# Patient Record
Sex: MALE | Race: BLACK OR AFRICAN AMERICAN | Employment: UNEMPLOYED | URBAN - METROPOLITAN AREA
[De-identification: names, ages, dates, MRNs, and addresses within clinical notes are randomized per-mention and may not be internally consistent; named-entity substitution may affect disease eponyms.]

---

## 2023-01-10 ENCOUNTER — APPOINTMENT (OUTPATIENT)
Dept: GENERAL RADIOLOGY | Facility: HOSPITAL | Age: 43
End: 2023-01-10
Attending: EMERGENCY MEDICINE
Payer: MEDICAID

## 2023-01-10 ENCOUNTER — APPOINTMENT (OUTPATIENT)
Dept: CT IMAGING | Facility: HOSPITAL | Age: 43
End: 2023-01-10
Attending: EMERGENCY MEDICINE
Payer: MEDICAID

## 2023-01-10 ENCOUNTER — HOSPITAL ENCOUNTER (INPATIENT)
Facility: HOSPITAL | Age: 43
LOS: 10 days | Discharge: HOME HEALTH | End: 2023-01-21
Attending: EMERGENCY MEDICINE | Admitting: INTERNAL MEDICINE
Payer: MEDICAID

## 2023-01-10 DIAGNOSIS — G06.0 INTRACRANIAL ABSCESS: ICD-10-CM

## 2023-01-10 DIAGNOSIS — N17.9 AKI (ACUTE KIDNEY INJURY) (HCC): ICD-10-CM

## 2023-01-10 DIAGNOSIS — G06.2 SUBDURAL EMPYEMA: ICD-10-CM

## 2023-01-10 DIAGNOSIS — E87.1 HYPONATREMIA: ICD-10-CM

## 2023-01-10 DIAGNOSIS — A41.9 SEVERE SEPSIS (HCC): Primary | ICD-10-CM

## 2023-01-10 DIAGNOSIS — R65.20 SEVERE SEPSIS (HCC): Primary | ICD-10-CM

## 2023-01-10 DIAGNOSIS — G40.109 FOCAL MOTOR SEIZURE (HCC): ICD-10-CM

## 2023-01-10 DIAGNOSIS — G93.40 ENCEPHALOPATHY: ICD-10-CM

## 2023-01-10 DIAGNOSIS — J18.9 COMMUNITY ACQUIRED PNEUMONIA OF LEFT LOWER LOBE OF LUNG: ICD-10-CM

## 2023-01-10 LAB
ALBUMIN SERPL-MCNC: 1.7 G/DL (ref 3.4–5)
ALP LIVER SERPL-CCNC: 255 U/L
ALT SERPL-CCNC: 102 U/L
ANION GAP SERPL CALC-SCNC: 13 MMOL/L (ref 0–18)
AST SERPL-CCNC: 290 U/L (ref 15–37)
BASOPHILS # BLD: 0 X10(3) UL (ref 0–0.2)
BASOPHILS NFR BLD: 0 %
BILIRUB DIRECT SERPL-MCNC: 2.4 MG/DL (ref 0–0.2)
BILIRUB SERPL-MCNC: 2.9 MG/DL (ref 0.1–2)
BILIRUB UR QL CFM: POSITIVE
BUN BLD-MCNC: 63 MG/DL (ref 7–18)
BUN/CREAT SERPL: 42 (ref 10–20)
CALCIUM BLD-MCNC: 8.9 MG/DL (ref 8.5–10.1)
CHLORIDE SERPL-SCNC: 90 MMOL/L (ref 98–112)
CLARITY UR: CLEAR
CO2 SERPL-SCNC: 24 MMOL/L (ref 21–32)
COLOR UR: YELLOW
CREAT BLD-MCNC: 1.5 MG/DL
DEPRECATED RDW RBC AUTO: 43.4 FL (ref 35.1–46.3)
DOHLE BOD BLD QL SMEAR: PRESENT
EOSINOPHIL # BLD: 0 X10(3) UL (ref 0–0.7)
EOSINOPHIL NFR BLD: 0 %
ERYTHROCYTE [DISTWIDTH] IN BLOOD BY AUTOMATED COUNT: 14.3 % (ref 11–15)
FLUAV + FLUBV RNA SPEC NAA+PROBE: NEGATIVE
FLUAV + FLUBV RNA SPEC NAA+PROBE: NEGATIVE
GFR SERPLBLD BASED ON 1.73 SQ M-ARVRAT: 60 ML/MIN/1.73M2 (ref 60–?)
GLUCOSE BLD-MCNC: 128 MG/DL (ref 70–99)
GLUCOSE UR-MCNC: NEGATIVE MG/DL
HCT VFR BLD AUTO: 26.3 %
HGB BLD-MCNC: 9.6 G/DL
KETONES UR-MCNC: NEGATIVE MG/DL
LACTATE SERPL-SCNC: 4.1 MMOL/L (ref 0.4–2)
LEUKOCYTE ESTERASE UR QL STRIP.AUTO: NEGATIVE
LIPASE SERPL-CCNC: 248 U/L (ref 73–393)
LYMPHOCYTES NFR BLD: 1.72 X10(3) UL (ref 1–4)
LYMPHOCYTES NFR BLD: 3 %
MCH RBC QN AUTO: 30.9 PG (ref 26–34)
MCHC RBC AUTO-ENTMCNC: 36.5 G/DL (ref 31–37)
MCV RBC AUTO: 84.6 FL
MONOCYTES # BLD: 1.72 X10(3) UL (ref 0.1–1)
MONOCYTES NFR BLD: 3 %
NEUTROPHILS # BLD AUTO: 51.27 X10 (3) UL (ref 1.5–7.7)
NEUTROPHILS NFR BLD: 93 %
NEUTS BAND NFR BLD: 1 %
NEUTS HYPERSEG # BLD: 53.86 X10(3) UL (ref 1.5–7.7)
NEUTS HYPERSEG BLD QL SMEAR: PRESENT
NEUTS VAC BLD QL SMEAR: PRESENT
NITRITE UR QL STRIP.AUTO: NEGATIVE
OSMOLALITY SERPL CALC.SUM OF ELEC: 284 MOSM/KG (ref 275–295)
PH UR: 5.5 [PH] (ref 5–8)
PLATELET # BLD AUTO: 287 10(3)UL (ref 150–450)
POTASSIUM SERPL-SCNC: 3.7 MMOL/L (ref 3.5–5.1)
PROT SERPL-MCNC: 8 G/DL (ref 6.4–8.2)
RBC # BLD AUTO: 3.11 X10(6)UL
RSV RNA SPEC NAA+PROBE: NEGATIVE
SARS-COV-2 RNA RESP QL NAA+PROBE: NOT DETECTED
SODIUM SERPL-SCNC: 127 MMOL/L (ref 136–145)
SP GR UR STRIP: 1.01 (ref 1–1.03)
TOTAL CELLS COUNTED BLD: 100
TOXIC GRANULES BLD QL SMEAR: PRESENT
UROBILINOGEN UR STRIP-ACNC: 2
WBC # BLD AUTO: 57.3 X10(3) UL (ref 4–11)

## 2023-01-10 PROCEDURE — 71045 X-RAY EXAM CHEST 1 VIEW: CPT | Performed by: EMERGENCY MEDICINE

## 2023-01-10 PROCEDURE — 74177 CT ABD & PELVIS W/CONTRAST: CPT | Performed by: EMERGENCY MEDICINE

## 2023-01-10 RX ORDER — VANCOMYCIN HYDROCHLORIDE
15 ONCE
Status: COMPLETED | OUTPATIENT
Start: 2023-01-10 | End: 2023-01-11

## 2023-01-10 RX ORDER — KETOROLAC TROMETHAMINE 15 MG/ML
15 INJECTION, SOLUTION INTRAMUSCULAR; INTRAVENOUS ONCE
Status: COMPLETED | OUTPATIENT
Start: 2023-01-10 | End: 2023-01-10

## 2023-01-11 ENCOUNTER — APPOINTMENT (OUTPATIENT)
Dept: CV DIAGNOSTICS | Facility: HOSPITAL | Age: 43
End: 2023-01-11
Attending: PHYSICIAN ASSISTANT
Payer: MEDICAID

## 2023-01-11 ENCOUNTER — APPOINTMENT (OUTPATIENT)
Dept: CT IMAGING | Facility: HOSPITAL | Age: 43
End: 2023-01-11
Attending: INTERNAL MEDICINE
Payer: MEDICAID

## 2023-01-11 ENCOUNTER — APPOINTMENT (OUTPATIENT)
Dept: GENERAL RADIOLOGY | Facility: HOSPITAL | Age: 43
End: 2023-01-11
Attending: INTERNAL MEDICINE
Payer: MEDICAID

## 2023-01-11 PROBLEM — E87.1 HYPONATREMIA: Status: ACTIVE | Noted: 2023-01-11

## 2023-01-11 PROBLEM — J18.9 COMMUNITY ACQUIRED PNEUMONIA OF LEFT LOWER LOBE OF LUNG: Status: ACTIVE | Noted: 2023-01-11

## 2023-01-11 PROBLEM — N17.9 AKI (ACUTE KIDNEY INJURY) (HCC): Status: ACTIVE | Noted: 2023-01-11

## 2023-01-11 LAB
AMPHET UR QL SCN: NEGATIVE
ANION GAP SERPL CALC-SCNC: 12 MMOL/L (ref 0–18)
BASOPHILS # BLD: 0 X10(3) UL (ref 0–0.2)
BASOPHILS NFR BLD: 0 %
BASOPHILS NFR SNV: 0 %
BENZODIAZ UR QL SCN: NEGATIVE
BUN BLD-MCNC: 39 MG/DL (ref 7–18)
BUN/CREAT SERPL: 47.6 (ref 10–20)
CALCIUM BLD-MCNC: 7.9 MG/DL (ref 8.5–10.1)
CHLORIDE SERPL-SCNC: 105 MMOL/L (ref 98–112)
CO2 SERPL-SCNC: 20 MMOL/L (ref 21–32)
COCAINE UR QL: NEGATIVE
COLOR FLD: YELLOW
CREAT BLD-MCNC: 0.82 MG/DL
CREAT UR-SCNC: 51.7 MG/DL
DEPRECATED RDW RBC AUTO: 41.7 FL (ref 35.1–46.3)
EOSINOPHIL # BLD: 0 X10(3) UL (ref 0–0.7)
EOSINOPHIL NFR BLD: 0 %
EOSINOPHIL NFR SNV: 0 %
ERYTHROCYTE [DISTWIDTH] IN BLOOD BY AUTOMATED COUNT: 13.9 % (ref 11–15)
ETHANOL SERPL-MCNC: <3 MG/DL (ref ?–3)
GFR SERPLBLD BASED ON 1.73 SQ M-ARVRAT: 113 ML/MIN/1.73M2 (ref 60–?)
GLUCOSE BLD-MCNC: 88 MG/DL (ref 70–99)
HCT VFR BLD AUTO: 23.1 %
HGB BLD-MCNC: 8.6 G/DL
LACTATE SERPL-SCNC: 2.2 MMOL/L (ref 0.4–2)
LACTATE SERPL-SCNC: 3.1 MMOL/L (ref 0.4–2)
LYMPHOCYTES NFR BLD: 1.56 X10(3) UL (ref 1–4)
LYMPHOCYTES NFR BLD: 2 %
LYMPHOCYTES NFR SNV: 9 %
MCH RBC QN AUTO: 30.7 PG (ref 26–34)
MCHC RBC AUTO-ENTMCNC: 37.2 G/DL (ref 31–37)
MCV RBC AUTO: 82.5 FL
MDMA UR QL SCN: NEGATIVE
MONOCYTES # BLD: 0.52 X10(3) UL (ref 0.1–1)
MONOCYTES NFR BLD: 1 %
MONOS+MACROS NFR SNV: 15 %
NEUTROPHILS # BLD AUTO: 45.25 X10 (3) UL (ref 1.5–7.7)
NEUTROPHILS NFR BLD: 95 %
NEUTROPHILS NFR FLD: 76 %
NEUTS BAND NFR BLD: 1 %
NEUTS HYPERSEG # BLD: 50.02 X10(3) UL (ref 1.5–7.7)
NEUTS VAC BLD QL SMEAR: PRESENT
OPIATES UR QL SCN: NEGATIVE
OSMOLALITY SERPL CALC.SUM OF ELEC: 293 MOSM/KG (ref 275–295)
OXYCODONE UR QL SCN: NEGATIVE
PLATELET # BLD AUTO: 309 10(3)UL (ref 150–450)
POTASSIUM SERPL-SCNC: 3.3 MMOL/L (ref 3.5–5.1)
POTASSIUM SERPL-SCNC: 4 MMOL/L (ref 3.5–5.1)
RBC # BLD AUTO: 2.8 X10(6)UL
RBC # FLD AUTO: ABNORMAL /CUMM (ref ?–1)
SODIUM SERPL-SCNC: 137 MMOL/L (ref 136–145)
TOTAL CELLS COUNTED BLD: 100
TOTAL CELLS COUNTED FLD: 100
TOTAL CELLS COUNTED SNV: ABNORMAL /CUMM (ref 0–200)
TOXIC GRANULES BLD QL SMEAR: PRESENT
VARIANT LYMPHS NFR BLD MANUAL: 1 %
WBC # BLD AUTO: 52.1 X10(3) UL (ref 4–11)
WBC # SNV: NORMAL /CUMM

## 2023-01-11 PROCEDURE — 71250 CT THORAX DX C-: CPT | Performed by: INTERNAL MEDICINE

## 2023-01-11 PROCEDURE — 93306 TTE W/DOPPLER COMPLETE: CPT | Performed by: PHYSICIAN ASSISTANT

## 2023-01-11 PROCEDURE — 99223 1ST HOSP IP/OBS HIGH 75: CPT | Performed by: INTERNAL MEDICINE

## 2023-01-11 PROCEDURE — 0S9C3ZX DRAINAGE OF RIGHT KNEE JOINT, PERCUTANEOUS APPROACH, DIAGNOSTIC: ICD-10-PCS | Performed by: PHYSICIAN ASSISTANT

## 2023-01-11 PROCEDURE — 73560 X-RAY EXAM OF KNEE 1 OR 2: CPT | Performed by: INTERNAL MEDICINE

## 2023-01-11 RX ORDER — ACETAMINOPHEN 325 MG/1
650 TABLET ORAL EVERY 6 HOURS PRN
Status: DISCONTINUED | OUTPATIENT
Start: 2023-01-11 | End: 2023-01-21

## 2023-01-11 RX ORDER — LORAZEPAM 2 MG/ML
2 INJECTION INTRAMUSCULAR
Status: DISCONTINUED | OUTPATIENT
Start: 2023-01-11 | End: 2023-01-21

## 2023-01-11 RX ORDER — NICOTINE 21 MG/24HR
1 PATCH, TRANSDERMAL 24 HOURS TRANSDERMAL DAILY
Status: DISCONTINUED | OUTPATIENT
Start: 2023-01-11 | End: 2023-01-21

## 2023-01-11 RX ORDER — LORAZEPAM 1 MG/1
2 TABLET ORAL
Status: DISCONTINUED | OUTPATIENT
Start: 2023-01-11 | End: 2023-01-21

## 2023-01-11 RX ORDER — HEPARIN SODIUM 5000 [USP'U]/ML
5000 INJECTION, SOLUTION INTRAVENOUS; SUBCUTANEOUS EVERY 12 HOURS
Status: DISCONTINUED | OUTPATIENT
Start: 2023-01-11 | End: 2023-01-21

## 2023-01-11 RX ORDER — ONDANSETRON 2 MG/ML
4 INJECTION INTRAMUSCULAR; INTRAVENOUS EVERY 4 HOURS PRN
Status: ACTIVE | OUTPATIENT
Start: 2023-01-11 | End: 2023-01-11

## 2023-01-11 RX ORDER — SODIUM CHLORIDE 9 MG/ML
INJECTION, SOLUTION INTRAVENOUS CONTINUOUS
Status: DISCONTINUED | OUTPATIENT
Start: 2023-01-11 | End: 2023-01-19

## 2023-01-11 RX ORDER — LORAZEPAM 2 MG/ML
1 INJECTION INTRAMUSCULAR
Status: DISCONTINUED | OUTPATIENT
Start: 2023-01-11 | End: 2023-01-21

## 2023-01-11 RX ORDER — LORAZEPAM 1 MG/1
1 TABLET ORAL
Status: DISCONTINUED | OUTPATIENT
Start: 2023-01-11 | End: 2023-01-21

## 2023-01-11 RX ORDER — POTASSIUM CHLORIDE 1.5 G/1.77G
40 POWDER, FOR SOLUTION ORAL EVERY 4 HOURS
Status: COMPLETED | OUTPATIENT
Start: 2023-01-11 | End: 2023-01-11

## 2023-01-11 RX ORDER — SODIUM CHLORIDE 9 MG/ML
INJECTION, SOLUTION INTRAVENOUS CONTINUOUS
Status: ACTIVE | OUTPATIENT
Start: 2023-01-11 | End: 2023-01-11

## 2023-01-11 NOTE — PLAN OF CARE
Pt is A&Ox3, confuse and mumbles. Denies pain unless you move his leg. Admission complete. Pt had lactic acid of 2.2. MD notified. Bolus given. IVF running at 125. Spouse at bedside. Call light within reach and monitoring will continue.   Problem: Patient Centered Care  Goal: Patient preferences are identified and integrated in the patient's plan of care  Description: Interventions:  - What would you like us to know as we care for you?   - Provide timely, complete, and accurate information to patient/family  - Incorporate patient and family knowledge, values, beliefs, and cultural backgrounds into the planning and delivery of care  - Encourage patient/family to participate in care and decision-making at the level they choose  - Honor patient and family perspectives and choices  Outcome: Progressing     Problem: Patient/Family Goals  Goal: Patient/Family Long Term Goal  Description: Patient's Long Term Goal:   Interventions:  -   - See additional Care Plan goals for specific interventions  Outcome: Progressing  Goal: Patient/Family Short Term Goal  Description: Patient's Short Term Goal:     Interventions:   -   - See additional Care Plan goals for specific interventions  Outcome: Progressing

## 2023-01-11 NOTE — H&P
[facility]    PATIENT'S NAME: Ap Anderson PHYSICIAN: Kesha Hernandez MD   PATIENT ACCOUNT#:   [id number]    LOCATION:  [address] [place] [address] Street #:   [id number]       YOB: 1980  ADMISSION DATE:       01/10/2023    HISTORY AND PHYSICAL EXAMINATION    HISTORY OF PRESENT ILLNESS:  The patient is a 43-year-old male with no significant past medical history, presented to the emergency room with a chief complaint of fever and cough for the last 1 week. According to family, symptoms have been getting worse for the last 4 days and found to be more confused yesterday. The patient also complains of severe right knee pain and swelling but denies any history of trauma. In the emergency room, the patient was confused. He was also found to be tachypneic and tachycardic with WBC count of 57. Chest x-ray showed pneumonia. Chest x-ray showed a large left-sided pulmonary consolidation and a smaller right-sided consolidation. The patient also had abnormal LFTs, was started on IV antibiotics and admitted for further treatment. Infectious Disease and Pulmonary were consulted. PAST MEDICAL HISTORY:  Denies. PAST SURGICAL HISTORY:  None. MEDICATIONS:  See MAR. ALLERGIES:  Penicillin. FAMILY HISTORY:  Nothing significant. SOCIAL HISTORY:  Chronic smoker, smokes 1 to 2 packs a day. Denies history of alcohol use or drug use. REVIEW OF SYSTEMS:  Denies chest pain. Complains of shortness of breath. Denies abdominal pain, nausea, vomiting. Complains of diarrhea. PHYSICAL EXAMINATION:    GENERAL:  Awake, confused, moderate distress secondary to shortness of breath. VITAL SIGNS:  T-max 100. Pulse on admission 121, respirations 27, blood pressure 114/70. HEENT:  Normocephalic. NECK:  Supple. LUNGS:   Coarse breath sounds bilaterally. Crackles present. HEART:  S1, S2 heard normally. Tachycardic. ABDOMEN:  Soft, nondistended, nontender.   Bowel sounds present. EXTREMITIES:  No edema. NEUROLOGIC:  Awake but confused. Moves both upper and lower extremities. LABORATORY DATA:  WBC 57. Serum lactic acid of 4.1. Hemoglobin 9.6. Sodium 127, chloride 90, glucose 128, creatinine 1.5. , , total bilirubin of 2.9. CT showed extensive left lower lobe consolidation and small pancreatic pseudocyst.    ASSESSMENT AND PLAN:    1. Sepsis. On admission, the patient was found to be tachypneic and tachycardic with lactic acidosis, started on IV antibiotics, on meropenem and vancomycin. Infectious Disease and Pulmonary consults. 2.   Hyponatremia. Abnormal LFTs. 3.   Lactic acidosis. Follow serum lactic acid level. 4.   Acute kidney injury. Continue IV hydration and repeat follow BMP. 5.   Diarrhea. 6.   DVT prophylaxis. Subcutaneous heparin. Discussed with the patient's wife who was at bedside.     Dictated By Stephie Syed MD  d: 01/11/2023 07:13:40  t: 01/11/2023 09:00:29  Job [id number]  MM/

## 2023-01-11 NOTE — ED QUICK NOTES
Patient is alert and oriented x3; forgetful. Showing no signs or symptoms of any respiratory distress. Wet cough. Using urinal. Significant other at bedside- assisting patient with patient care needs. Comfort measures remain in place.

## 2023-01-11 NOTE — ED QUICK NOTES
Orders for admission, patient is aware of plan and ready to go upstairs. Any questions, please call ED RN Judit at extension [phone].      Patient Covid vaccination status: Unvaccinated     COVID Test Ordered in ED: SARS-CoV-2/Flu A and B/RSV by PCR (GeneXpert)    COVID Suspicion at Admission: N/A    Running Infusions:   Vanco via IV pump    Mental Status/LOC at time of transport: AAOX3; forgetful    Other pertinent information:   CIWA score: N/A   NIH score:  N/A

## 2023-01-11 NOTE — ED INITIAL ASSESSMENT (HPI)
Pt AOx4 with some confusion C/C fever, diarrhea for a week as well as a swollen and painful right knee since earlier today, EMS reports warm to touch. SpO2 92% RA and .

## 2023-01-11 NOTE — PLAN OF CARE
Cat Mauricio is A&Ox3 and confused at times. Patient on RA with c/o of right knee pain. PRN meds given. Sepsis alerted and MD and ID aware. Patient on IV abx and IV fluids. Patient positive blood cultures and repeat for tomorrow. Patient seen by SLP, ID, and Pulm today. See notes. Patient had for 2D echo and CT of chest. Plan to see ortho and PT/OT. [facility] protocol in place. Problem: Patient Centered Care  Goal: Patient preferences are identified and integrated in the patient's plan of care  Description: Interventions:  - What would you like us to know as we care for you?  Home with wife  - Provide timely, complete, and accurate information to patient/family  - Incorporate patient and family knowledge, values, beliefs, and cultural backgrounds into the planning and delivery of care  - Encourage patient/family to participate in care and decision-making at the level they choose  - Honor patient and family perspectives and choices  Outcome: Progressing     Problem: Patient/Family Goals  Goal: Patient/Family Long Term Goal  Description: Patient's Long Term Goal: to improve breathing    Interventions:  - pulm consult  -follow plan of care  -medication management   - See additional Care Plan goals for specific interventions  Outcome: Progressing  Goal: Patient/Family Short Term Goal  Description: Patient's Short Term Goal: to go home    Interventions:   - improve sepsis symptoms  -IV abx/IV fluids  -monitor vitals and labs    - See additional Care Plan goals for specific interventions  Outcome: Progressing     Problem: CARDIOVASCULAR - ADULT  Goal: Maintains optimal cardiac output and hemodynamic stability  Description: INTERVENTIONS:  - Monitor vital signs, rhythm, and trends  - Monitor for bleeding, hypotension and signs of decreased cardiac output  - Evaluate effectiveness of vasoactive medications to optimize hemodynamic stability  - Monitor arterial and/or venous puncture sites for bleeding and/or hematoma  - Assess quality of pulses, skin color and temperature  - Assess for signs of decreased coronary artery perfusion - ex.  Angina  - Evaluate fluid balance, assess for edema, trend weights  Outcome: Progressing     Problem: GASTROINTESTINAL - ADULT  Goal: Minimal or absence of nausea and vomiting  Description: INTERVENTIONS:  - Maintain adequate hydration with IV or PO as ordered and tolerated  - Nasogastric tube to low intermittent suction as ordered  - Evaluate effectiveness of ordered antiemetic medications  - Provide nonpharmacologic comfort measures as appropriate  - Advance diet as tolerated, if ordered  - Obtain nutritional consult as needed  - Evaluate fluid balance  Outcome: Progressing     Problem: METABOLIC/FLUID AND ELECTROLYTES - ADULT  Goal: Electrolytes maintained within normal limits  Description: INTERVENTIONS:  - Monitor labs and rhythm and assess patient for signs and symptoms of electrolyte imbalances  - Administer electrolyte replacement as ordered  - Monitor response to electrolyte replacements, including rhythm and repeat lab results as appropriate  - Fluid restriction as ordered  - Instruct patient on fluid and nutrition restrictions as appropriate  Outcome: Progressing     Problem: SKIN/TISSUE INTEGRITY - ADULT  Goal: Skin integrity remains intact  Description: INTERVENTIONS  - Assess and document risk factors for pressure ulcer development  - Assess and document skin integrity  - Monitor for areas of redness and/or skin breakdown  - Initiate interventions, skin care algorithm/standards of care as needed  Outcome: Progressing     Problem: MUSCULOSKELETAL - ADULT  Goal: Return mobility to safest level of function  Description: INTERVENTIONS:  - Assess patient stability and activity tolerance for standing, transferring and ambulating w/ or w/o assistive devices  - Assist with transfers and ambulation using safe patient handling equipment as needed  - Ensure adequate protection for wounds/incisions during mobilization  - Obtain PT/OT consults as needed  - Advance activity as appropriate  - Communicate ordered activity level and limitations with patient/family  Outcome: Progressing     Problem: Impaired Functional Mobility  Goal: Achieve highest/safest level of mobility/gait  Description: Interventions:  - Assess patient's functional ability and stability  - Promote increasing activity/tolerance for mobility and gait  - Educate and engage patient/family in tolerated activity level and precautions  - Recommend patient transfer to bedside chair toward strongest side  - Elevate right lower extremity  Outcome: Progressing     Problem: Impaired Activities of Daily Living  Goal: Achieve highest/safest level of independence in self care  Description: Interventions:  - Assess ability and encourage patient to participate in ADLs to maximize function  - Promote sitting position while performing ADLs such as feeding, grooming, and bathing  - Educate and encourage patient/family in tolerated functional activity level and precautions during self-care  - Encourage patient to incorporate impaired side during daily activities to promote function  Outcome: Progressing     Problem: Impaired Swallowing  Goal: Minimize aspiration risk  Description: Interventions:  - Patient should be alert and upright for all feedings (90 degrees preferred)  - Offer food and liquids at a slow rate  - No straws  - Encourage small bites of food and small sips of liquid  - Offer pills one at a time, crush or deliver with applesauce as needed  - Discontinue feeding and notify MD (or speech pathologist) if coughing or persistent throat clearing or wet/gurgly vocal quality is noted  Outcome: Progressing     Problem: Impaired Cognition  Goal: Patient will exhibit improved attention, thought processing and/or memory  Description: Interventions:  - Minimize distractions in the room when full attention is required  Outcome: Progressing

## 2023-01-12 LAB
BASOPHILS # BLD: 0 X10(3) UL (ref 0–0.2)
BASOPHILS NFR BLD: 0 %
BASOPHILS NFR SNV: 0 %
COLOR FLD: YELLOW
CRYSTALS FLD MICRO: POSITIVE
CRYSTALS FLD MICRO: POSITIVE
DEPRECATED RDW RBC AUTO: 42.4 FL (ref 35.1–46.3)
EOSINOPHIL # BLD: 0 X10(3) UL (ref 0–0.7)
EOSINOPHIL NFR BLD: 0 %
EOSINOPHIL NFR SNV: 0 %
ERYTHROCYTE [DISTWIDTH] IN BLOOD BY AUTOMATED COUNT: 14 % (ref 11–15)
HCT VFR BLD AUTO: 23.5 %
HGB BLD-MCNC: 8.7 G/DL
LYMPHOCYTES NFR BLD: 0.76 X10(3) UL (ref 1–4)
LYMPHOCYTES NFR BLD: 2 %
LYMPHOCYTES NFR SNV: 2 %
MCH RBC QN AUTO: 30.6 PG (ref 26–34)
MCHC RBC AUTO-ENTMCNC: 37 G/DL (ref 31–37)
MCV RBC AUTO: 82.7 FL
METAMYELOCYTES # BLD: 0.38 X10(3) UL
METAMYELOCYTES NFR BLD: 1 %
MONOCYTES # BLD: 0.76 X10(3) UL (ref 0.1–1)
MONOCYTES NFR BLD: 2 %
MONOS+MACROS NFR SNV: 4 %
NEUTROPHILS # BLD AUTO: 31.52 X10 (3) UL (ref 1.5–7.7)
NEUTROPHILS NFR BLD: 93 %
NEUTROPHILS NFR FLD: 94 %
NEUTS BAND NFR BLD: 2 %
NEUTS HYPERSEG # BLD: 36.1 X10(3) UL (ref 1.5–7.7)
PLATELET # BLD AUTO: 313 10(3)UL (ref 150–450)
PLATELET MORPHOLOGY: NORMAL
RBC # BLD AUTO: 2.84 X10(6)UL
RBC # FLD AUTO: ABNORMAL /CUMM (ref ?–1)
S PNEUM DNA BLD POS QL NAA+NON-PROBE: DETECTED
STREPTOCOCCUS DNA BLD POS NAA+NON-PROBE: DETECTED
TOTAL CELLS COUNTED BLD: 100
TOTAL CELLS COUNTED FLD: 100
TOTAL CELLS COUNTED SNV: ABNORMAL /CUMM (ref 0–200)
WBC # BLD AUTO: 38 X10(3) UL (ref 4–11)
WBC # SNV: NORMAL /CUMM

## 2023-01-12 PROCEDURE — 99233 SBSQ HOSP IP/OBS HIGH 50: CPT | Performed by: INTERNAL MEDICINE

## 2023-01-12 PROCEDURE — 0S9C3ZX DRAINAGE OF RIGHT KNEE JOINT, PERCUTANEOUS APPROACH, DIAGNOSTIC: ICD-10-PCS | Performed by: PHYSICIAN ASSISTANT

## 2023-01-12 NOTE — CM/SW NOTE
Department  notified of request for shawna BORJA referrals started. Assigned CM/SW to follow up with pt/family on further discharge planning.      Isaac Baron   January 12, 2023   14:09

## 2023-01-13 ENCOUNTER — APPOINTMENT (OUTPATIENT)
Dept: GENERAL RADIOLOGY | Facility: HOSPITAL | Age: 43
End: 2023-01-13
Attending: EMERGENCY MEDICINE
Payer: MEDICAID

## 2023-01-13 ENCOUNTER — APPOINTMENT (OUTPATIENT)
Dept: GENERAL RADIOLOGY | Facility: HOSPITAL | Age: 43
End: 2023-01-13
Attending: INTERNAL MEDICINE
Payer: MEDICAID

## 2023-01-13 LAB
ALBUMIN SERPL-MCNC: 1.4 G/DL (ref 3.4–5)
ALBUMIN/GLOB SERPL: 0.3 {RATIO} (ref 1–2)
ALP LIVER SERPL-CCNC: 118 U/L
ALT SERPL-CCNC: 48 U/L
AMMONIA PLAS-MCNC: <10 UMOL/L (ref 11–32)
AMYLASE SERPL-CCNC: 60 U/L (ref 25–115)
ANION GAP SERPL CALC-SCNC: 9 MMOL/L (ref 0–18)
AST SERPL-CCNC: 70 U/L (ref 15–37)
BASE EXCESS BLD CALC-SCNC: 0.7 MMOL/L (ref ?–2)
BASOPHILS # BLD: 0 X10(3) UL (ref 0–0.2)
BASOPHILS NFR BLD: 0 %
BASOPHILS NFR SNV: 0 %
BILIRUB SERPL-MCNC: 0.9 MG/DL (ref 0.1–2)
BUN BLD-MCNC: 6 MG/DL (ref 7–18)
BUN/CREAT SERPL: 14.3 (ref 10–20)
CA-I BLD-SCNC: 1.08 MMOL/L (ref 0.95–1.32)
CALCIUM BLD-MCNC: 7.8 MG/DL (ref 8.5–10.1)
CHLORIDE SERPL-SCNC: 103 MMOL/L (ref 98–112)
CO2 SERPL-SCNC: 24 MMOL/L (ref 21–32)
COHGB MFR BLD: 2.6 % (ref 0–3)
COLOR FLD: YELLOW
CREAT BLD-MCNC: 0.42 MG/DL
DEPRECATED RDW RBC AUTO: 43.7 FL (ref 35.1–46.3)
EOSINOPHIL # BLD: 0 X10(3) UL (ref 0–0.7)
EOSINOPHIL NFR BLD: 0 %
EOSINOPHIL NFR SNV: 0 %
ERYTHROCYTE [DISTWIDTH] IN BLOOD BY AUTOMATED COUNT: 14.2 % (ref 11–15)
GFR SERPLBLD BASED ON 1.73 SQ M-ARVRAT: 139 ML/MIN/1.73M2 (ref 60–?)
GLOBULIN PLAS-MCNC: 4.9 G/DL (ref 2.8–4.4)
GLUCOSE BLD-MCNC: 90 MG/DL (ref 70–99)
GLUCOSE BLDC GLUCOMTR-MCNC: 134 MG/DL (ref 70–99)
HCO3 BLDA-SCNC: 25.4 MEQ/L (ref 21–27)
HCT VFR BLD AUTO: 24.9 %
HGB BLD-MCNC: 11.9 G/DL
HGB BLD-MCNC: 8.9 G/DL
LACTATE BLD-SCNC: 1.1 MMOL/L (ref 0.5–2)
LACTATE SERPL-SCNC: 1 MMOL/L (ref 0.4–2)
LIPASE SERPL-CCNC: 248 U/L (ref 73–393)
LYMPHOCYTES NFR BLD: 2.88 X10(3) UL (ref 1–4)
LYMPHOCYTES NFR BLD: 8 %
LYMPHOCYTES NFR SNV: 2 %
MCH RBC QN AUTO: 29.9 PG (ref 26–34)
MCHC RBC AUTO-ENTMCNC: 35.7 G/DL (ref 31–37)
MCV RBC AUTO: 83.6 FL
METAMYELOCYTES # BLD: 0.32 X10(3) UL
METAMYELOCYTES NFR BLD: 1 %
METHGB MFR BLD: 1 % SAT (ref 0.4–1.5)
MODIFIED ALLEN TEST: POSITIVE
MONOCYTES # BLD: 0.96 X10(3) UL (ref 0.1–1)
MONOCYTES NFR BLD: 3 %
MONOS+MACROS NFR SNV: 3 %
NEUTROPHILS # BLD AUTO: 25.58 X10 (3) UL (ref 1.5–7.7)
NEUTROPHILS NFR BLD: 85 %
NEUTROPHILS NFR FLD: 95 %
NEUTS BAND NFR BLD: 2 %
NEUTS HYPERSEG # BLD: 27.84 X10(3) UL (ref 1.5–7.7)
NRBC BLD MANUAL-RTO: 1 %
O2 CT BLD-SCNC: 15.8 VOL% (ref 15–23)
OSMOLALITY SERPL CALC.SUM OF ELEC: 279 MOSM/KG (ref 275–295)
OXYGEN LITERS/MINUTE: 1 L/MIN
PCO2 BLDA: 34 MM HG (ref 35–45)
PH BLDA: 7.46 [PH] (ref 7.35–7.45)
PLATELET # BLD AUTO: 332 10(3)UL (ref 150–450)
PO2 BLDA: 77 MM HG (ref 80–100)
POTASSIUM BLD-SCNC: 3.5 MMOL/L (ref 3.6–5.1)
POTASSIUM SERPL-SCNC: 3.3 MMOL/L (ref 3.5–5.1)
PROT SERPL-MCNC: 6.3 G/DL (ref 6.4–8.2)
PUNCTURE CHARGE: YES
RBC # BLD AUTO: 2.98 X10(6)UL
RBC # FLD AUTO: ABNORMAL /CUMM (ref ?–1)
SAO2 % BLDA: 97.8 % (ref 94–100)
SODIUM BLD-SCNC: 132 MMOL/L (ref 135–145)
SODIUM SERPL-SCNC: 136 MMOL/L (ref 136–145)
TOTAL CELLS COUNTED BLD: 100
TOTAL CELLS COUNTED FLD: 100
TOTAL CELLS COUNTED SNV: ABNORMAL /CUMM (ref 0–200)
URATE SERPL-MCNC: 3.5 MG/DL
VARIANT LYMPHS NFR BLD MANUAL: 1 %
WBC # BLD AUTO: 32 X10(3) UL (ref 4–11)
WBC # SNV: NORMAL /CUMM

## 2023-01-13 PROCEDURE — 71045 X-RAY EXAM CHEST 1 VIEW: CPT | Performed by: EMERGENCY MEDICINE

## 2023-01-13 PROCEDURE — 3E0U33Z INTRODUCTION OF ANTI-INFLAMMATORY INTO JOINTS, PERCUTANEOUS APPROACH: ICD-10-PCS | Performed by: ORTHOPAEDIC SURGERY

## 2023-01-13 PROCEDURE — 3E0U3BZ INTRODUCTION OF ANESTHETIC AGENT INTO JOINTS, PERCUTANEOUS APPROACH: ICD-10-PCS | Performed by: ORTHOPAEDIC SURGERY

## 2023-01-13 PROCEDURE — 99233 SBSQ HOSP IP/OBS HIGH 50: CPT | Performed by: INTERNAL MEDICINE

## 2023-01-13 PROCEDURE — 71045 X-RAY EXAM CHEST 1 VIEW: CPT | Performed by: INTERNAL MEDICINE

## 2023-01-13 RX ORDER — TRIAMCINOLONE ACETONIDE 40 MG/ML
40 INJECTION, SUSPENSION INTRA-ARTICULAR; INTRAMUSCULAR ONCE
Status: COMPLETED | OUTPATIENT
Start: 2023-01-13 | End: 2023-01-13

## 2023-01-13 RX ORDER — BENZONATATE 100 MG/1
200 CAPSULE ORAL 3 TIMES DAILY PRN
Status: DISCONTINUED | OUTPATIENT
Start: 2023-01-13 | End: 2023-01-21

## 2023-01-13 RX ORDER — LIDOCAINE HYDROCHLORIDE 10 MG/ML
5 INJECTION, SOLUTION EPIDURAL; INFILTRATION; INTRACAUDAL; PERINEURAL ONCE
Status: COMPLETED | OUTPATIENT
Start: 2023-01-13 | End: 2023-01-13

## 2023-01-13 RX ORDER — ALBUTEROL SULFATE 2.5 MG/3ML
2.5 SOLUTION RESPIRATORY (INHALATION) EVERY 6 HOURS PRN
Status: DISCONTINUED | OUTPATIENT
Start: 2023-01-13 | End: 2023-01-21

## 2023-01-13 NOTE — PLAN OF CARE
Cat Mauricio is A&Ox2/3 on 2L oxygen and c/o of Right knee pain. PRN meds offered/given. K+ low and replaced. CIWA protocols. Patient seen by ortho and had steroid injection done. See notes. Patient seen by ID today and continue IV abx and IV fluids. ABG done per pulm. See results. Patient and repeat chest XR. See results. Plan to discharge to [facility] per once medically stable. PProblem: Patient Centered Care  Goal: Patient preferences are identified and integrated in the patient's plan of care  Description: Interventions:  - What would you like us to know as we care for you?  Home with wife  - Provide timely, complete, and accurate information to patient/family  - Incorporate patient and family knowledge, values, beliefs, and cultural backgrounds into the planning and delivery of care  - Encourage patient/family to participate in care and decision-making at the level they choose  - Honor patient and family perspectives and choices  Outcome: Progressing     Problem: Patient/Family Goals  Goal: Patient/Family Long Term Goal  Description: Patient's Long Term Goal: to improve breathing    Interventions:  -Monitor vitals/prn neds  -follow plan of care  -medication management   - See additional Care Plan goals for specific interventions  Outcome: Progressing  Goal: Patient/Family Short Term Goal  Description: Patient's Short Term Goal: to go home    Interventions:   - improve sepsis symptoms  -IV abx/IV fluids  -monitor vitals and labs    - See additional Care Plan goals for specific interventions  Outcome: Progressing     Problem: CARDIOVASCULAR - ADULT  Goal: Maintains optimal cardiac output and hemodynamic stability  Description: INTERVENTIONS:  - Monitor vital signs, rhythm, and trends  - Monitor for bleeding, hypotension and signs of decreased cardiac output  - Evaluate effectiveness of vasoactive medications to optimize hemodynamic stability  - Monitor arterial and/or venous puncture sites for bleeding and/or hematoma  - Assess quality of pulses, skin color and temperature  - Assess for signs of decreased coronary artery perfusion - ex.  Angina  - Evaluate fluid balance, assess for edema, trend weights  Outcome: Progressing     Problem: GASTROINTESTINAL - ADULT  Goal: Minimal or absence of nausea and vomiting  Description: INTERVENTIONS:  - Maintain adequate hydration with IV or PO as ordered and tolerated  - Nasogastric tube to low intermittent suction as ordered  - Evaluate effectiveness of ordered antiemetic medications  - Provide nonpharmacologic comfort measures as appropriate  - Advance diet as tolerated, if ordered  - Obtain nutritional consult as needed  - Evaluate fluid balance  Outcome: Progressing     Problem: METABOLIC/FLUID AND ELECTROLYTES - ADULT  Goal: Electrolytes maintained within normal limits  Description: INTERVENTIONS:  - Monitor labs and rhythm and assess patient for signs and symptoms of electrolyte imbalances  - Administer electrolyte replacement as ordered  - Monitor response to electrolyte replacements, including rhythm and repeat lab results as appropriate  - Fluid restriction as ordered  - Instruct patient on fluid and nutrition restrictions as appropriate  Outcome: Progressing     Problem: SKIN/TISSUE INTEGRITY - ADULT  Goal: Skin integrity remains intact  Description: INTERVENTIONS  - Assess and document risk factors for pressure ulcer development  - Assess and document skin integrity  - Monitor for areas of redness and/or skin breakdown  - Initiate interventions, skin care algorithm/standards of care as needed  Outcome: Progressing     Problem: MUSCULOSKELETAL - ADULT  Goal: Return mobility to safest level of function  Description: INTERVENTIONS:  - Assess patient stability and activity tolerance for standing, transferring and ambulating w/ or w/o assistive devices  - Assist with transfers and ambulation using safe patient handling equipment as needed  - Ensure adequate protection for wounds/incisions during mobilization  - Obtain PT/OT consults as needed  - Advance activity as appropriate  - Communicate ordered activity level and limitations with patient/family  Outcome: Progressing     Problem: Impaired Functional Mobility  Goal: Achieve highest/safest level of mobility/gait  Description: Interventions:  - Assess patient's functional ability and stability  - Promote increasing activity/tolerance for mobility and gait  - Educate and engage patient/family in tolerated activity level and precautions  - Recommend patient transfer to bedside chair toward strongest side  - Elevate right lower extremity  Outcome: Progressing     Problem: Impaired Activities of Daily Living  Goal: Achieve highest/safest level of independence in self care  Description: Interventions:  - Assess ability and encourage patient to participate in ADLs to maximize function  - Promote sitting position while performing ADLs such as feeding, grooming, and bathing  - Educate and encourage patient/family in tolerated functional activity level and precautions during self-care  - Encourage patient to incorporate impaired side during daily activities to promote function  Outcome: Progressing     Problem: Impaired Swallowing  Goal: Minimize aspiration risk  Description: Interventions:  - Patient should be alert and upright for all feedings (90 degrees preferred)  - Offer food and liquids at a slow rate  - No straws  - Encourage small bites of food and small sips of liquid  - Offer pills one at a time, crush or deliver with applesauce as needed  - Discontinue feeding and notify MD (or speech pathologist) if coughing or persistent throat clearing or wet/gurgly vocal quality is noted  Outcome: Progressing     Problem: Impaired Cognition  Goal: Patient will exhibit improved attention, thought processing and/or memory  Description: Interventions:  - Minimize distractions in the room when full attention is required  Outcome: Progressing

## 2023-01-13 NOTE — CM/SW NOTE
Therapy recommendations are for JONH. Referrals sent in Aidin. CM will follow-up on choices, will require insurance approval for rehab services. / to remain available for support and/or discharge planning.      Gerardo Romero MBA BSN RN Valorie Lester  RN Case Manager  [phone]

## 2023-01-13 NOTE — PROGRESS NOTES
Speech Pathology Service    Attempted to see patient for dysphagia f/u per plan of care, currently NPO for possible arthroscopy today per RN. Will return as available/appropriate.     Tiki Mark M.S. [facility] Pathologist  [id number]

## 2023-01-13 NOTE — PLAN OF CARE
Patient resting in bed. Confused. On room air. Continuing IV fluids and IV antibiotics. CIWA protocol. Fall precautions in place. Call light in reach.    Problem: Impaired Swallowing  Goal: Minimize aspiration risk  Description: Interventions:  - Patient should be alert and upright for all feedings (90 degrees preferred)  - Offer food and liquids at a slow rate  - No straws  - Encourage small bites of food and small sips of liquid  - Offer pills one at a time, crush or deliver with applesauce as needed  - Discontinue feeding and notify MD (or speech pathologist) if coughing or persistent throat clearing or wet/gurgly vocal quality is noted  Outcome: Progressing     Problem: Impaired Activities of Daily Living  Goal: Achieve highest/safest level of independence in self care  Description: Interventions:  - Assess ability and encourage patient to participate in ADLs to maximize function  - Promote sitting position while performing ADLs such as feeding, grooming, and bathing  - Educate and encourage patient/family in tolerated functional activity level and precautions during self-care  -PT/OT  Outcome: Progressing     Problem: Impaired Functional Mobility  Goal: Achieve highest/safest level of mobility/gait  Description: Interventions:  - Assess patient's functional ability and stability  - Promote increasing activity/tolerance for mobility and gait  - Educate and engage patient/family in tolerated activity level and precautions  -PT/OT  Outcome: Progressing     Problem: MUSCULOSKELETAL - ADULT  Goal: Return mobility to safest level of function  Description: INTERVENTIONS:  - Assess patient stability and activity tolerance for standing, transferring and ambulating w/ or w/o assistive devices  - Assist with transfers and ambulation using safe patient handling equipment as needed  - Ensure adequate protection for wounds/incisions during mobilization  - Obtain PT/OT consults as needed  - Advance activity as appropriate  - Communicate ordered activity level and limitations with patient/family  Outcome: Progressing     Problem: SKIN/TISSUE INTEGRITY - ADULT  Goal: Skin integrity remains intact  Description: INTERVENTIONS  - Assess and document risk factors for pressure ulcer development  - Assess and document skin integrity  - Monitor for areas of redness and/or skin breakdown  - Initiate interventions, skin care algorithm/standards of care as needed  Outcome: Progressing     Problem: Impaired Cognition  Goal: Patient will exhibit improved attention, thought processing and/or memory  Description: Interventions:  -ST  Outcome: Progressing

## 2023-01-13 NOTE — OCCUPATIONAL THERAPY NOTE
Orders received, chart reviewed for occupational therapy treatment. Spoke with DEONNA Forbes --pt is not appropriate for activity. Pt with fever, now positive for strep pneumoniae bacteremia.

## 2023-01-14 ENCOUNTER — APPOINTMENT (OUTPATIENT)
Dept: CT IMAGING | Facility: HOSPITAL | Age: 43
End: 2023-01-14
Attending: Other
Payer: MEDICAID

## 2023-01-14 ENCOUNTER — APPOINTMENT (OUTPATIENT)
Dept: MRI IMAGING | Facility: HOSPITAL | Age: 43
End: 2023-01-14
Attending: Other
Payer: MEDICAID

## 2023-01-14 LAB
ANION GAP SERPL CALC-SCNC: 9 MMOL/L (ref 0–18)
BASOPHILS # BLD: 0 X10(3) UL (ref 0–0.2)
BASOPHILS NFR BLD: 0 %
BUN BLD-MCNC: 10 MG/DL (ref 7–18)
BUN/CREAT SERPL: 25.6 (ref 10–20)
CALCIUM BLD-MCNC: 7.9 MG/DL (ref 8.5–10.1)
CHLORIDE SERPL-SCNC: 106 MMOL/L (ref 98–112)
CO2 SERPL-SCNC: 23 MMOL/L (ref 21–32)
CREAT BLD-MCNC: 0.39 MG/DL
CRYSTALS FLD MICRO: POSITIVE
DEPRECATED RDW RBC AUTO: 42.8 FL (ref 35.1–46.3)
EOSINOPHIL # BLD: 0 X10(3) UL (ref 0–0.7)
EOSINOPHIL NFR BLD: 0 %
ERYTHROCYTE [DISTWIDTH] IN BLOOD BY AUTOMATED COUNT: 14 % (ref 11–15)
GFR SERPLBLD BASED ON 1.73 SQ M-ARVRAT: 142 ML/MIN/1.73M2 (ref 60–?)
GLUCOSE BLD-MCNC: 152 MG/DL (ref 70–99)
HCT VFR BLD AUTO: 24.6 %
HGB BLD-MCNC: 8.8 G/DL
LYMPHOCYTES NFR BLD: 0.84 X10(3) UL (ref 1–4)
LYMPHOCYTES NFR BLD: 3 %
MCH RBC QN AUTO: 29.9 PG (ref 26–34)
MCHC RBC AUTO-ENTMCNC: 35.8 G/DL (ref 31–37)
MCV RBC AUTO: 83.7 FL
MONOCYTES # BLD: 0.56 X10(3) UL (ref 0.1–1)
MONOCYTES NFR BLD: 2 %
NEUTROPHILS # BLD AUTO: 24 X10 (3) UL (ref 1.5–7.7)
NEUTROPHILS NFR BLD: 95 %
NEUTS HYPERSEG # BLD: 26.7 X10(3) UL (ref 1.5–7.7)
OSMOLALITY SERPL CALC.SUM OF ELEC: 288 MOSM/KG (ref 275–295)
PLATELET # BLD AUTO: 416 10(3)UL (ref 150–450)
PLATELET MORPHOLOGY: NORMAL
POTASSIUM SERPL-SCNC: 4.3 MMOL/L (ref 3.5–5.1)
POTASSIUM SERPL-SCNC: 4.3 MMOL/L (ref 3.5–5.1)
RBC # BLD AUTO: 2.94 X10(6)UL
SODIUM SERPL-SCNC: 138 MMOL/L (ref 136–145)
TOTAL CELLS COUNTED BLD: 100
TSI SER-ACNC: 0.61 MIU/ML (ref 0.36–3.74)
VIT B12 SERPL-MCNC: 1879 PG/ML (ref 193–986)
WBC # BLD AUTO: 28.1 X10(3) UL (ref 4–11)

## 2023-01-14 PROCEDURE — 99223 1ST HOSP IP/OBS HIGH 75: CPT | Performed by: OTHER

## 2023-01-14 PROCEDURE — 70551 MRI BRAIN STEM W/O DYE: CPT | Performed by: OTHER

## 2023-01-14 PROCEDURE — 99232 SBSQ HOSP IP/OBS MODERATE 35: CPT | Performed by: INTERNAL MEDICINE

## 2023-01-14 PROCEDURE — 70552 MRI BRAIN STEM W/DYE: CPT | Performed by: OTHER

## 2023-01-14 PROCEDURE — 70450 CT HEAD/BRAIN W/O DYE: CPT | Performed by: OTHER

## 2023-01-14 RX ORDER — SODIUM PHOSPHATE, DIBASIC AND SODIUM PHOSPHATE, MONOBASIC 7; 19 G/133ML; G/133ML
1 ENEMA RECTAL ONCE AS NEEDED
Status: DISCONTINUED | OUTPATIENT
Start: 2023-01-14 | End: 2023-01-21

## 2023-01-14 RX ORDER — LORAZEPAM 2 MG/ML
2 INJECTION INTRAMUSCULAR EVERY 5 MIN PRN
Status: DISCONTINUED | OUTPATIENT
Start: 2023-01-14 | End: 2023-01-21

## 2023-01-14 RX ORDER — LORAZEPAM 0.5 MG/1
0.5 TABLET ORAL
Status: COMPLETED | OUTPATIENT
Start: 2023-01-14 | End: 2023-01-14

## 2023-01-14 RX ORDER — METRONIDAZOLE 500 MG/100ML
500 INJECTION, SOLUTION INTRAVENOUS EVERY 8 HOURS
Status: DISCONTINUED | OUTPATIENT
Start: 2023-01-14 | End: 2023-01-14

## 2023-01-14 RX ORDER — POLYETHYLENE GLYCOL 3350 17 G/17G
17 POWDER, FOR SOLUTION ORAL DAILY PRN
Status: DISCONTINUED | OUTPATIENT
Start: 2023-01-14 | End: 2023-01-21

## 2023-01-14 RX ORDER — METRONIDAZOLE 500 MG/100ML
500 INJECTION, SOLUTION INTRAVENOUS EVERY 8 HOURS
Status: DISCONTINUED | OUTPATIENT
Start: 2023-01-15 | End: 2023-01-14

## 2023-01-14 RX ORDER — DOCUSATE SODIUM 100 MG/1
100 CAPSULE, LIQUID FILLED ORAL 2 TIMES DAILY
Status: DISCONTINUED | OUTPATIENT
Start: 2023-01-14 | End: 2023-01-21

## 2023-01-14 RX ORDER — LACOSAMIDE 10 MG/ML
200 INJECTION, SOLUTION INTRAVENOUS ONCE
Status: COMPLETED | OUTPATIENT
Start: 2023-01-14 | End: 2023-01-14

## 2023-01-14 RX ORDER — METRONIDAZOLE 500 MG/100ML
500 INJECTION, SOLUTION INTRAVENOUS EVERY 8 HOURS
Status: DISCONTINUED | OUTPATIENT
Start: 2023-01-14 | End: 2023-01-21

## 2023-01-14 RX ORDER — LORAZEPAM 2 MG/ML
0.1 INJECTION INTRAMUSCULAR EVERY 5 MIN PRN
Status: DISCONTINUED | OUTPATIENT
Start: 2023-01-14 | End: 2023-01-14

## 2023-01-14 RX ORDER — BISACODYL 10 MG
10 SUPPOSITORY, RECTAL RECTAL
Status: DISCONTINUED | OUTPATIENT
Start: 2023-01-14 | End: 2023-01-21

## 2023-01-14 RX ORDER — VANCOMYCIN 2 GRAM/500 ML IN 0.9 % SODIUM CHLORIDE INTRAVENOUS
25 ONCE
Status: COMPLETED | OUTPATIENT
Start: 2023-01-14 | End: 2023-01-14

## 2023-01-14 RX ORDER — VANCOMYCIN HYDROCHLORIDE
20 EVERY 12 HOURS
Status: DISCONTINUED | OUTPATIENT
Start: 2023-01-15 | End: 2023-01-16 | Stop reason: DRUGHIGH

## 2023-01-14 RX ORDER — LORAZEPAM 0.5 MG/1
0.5 TABLET ORAL
Status: DISCONTINUED | OUTPATIENT
Start: 2023-01-15 | End: 2023-01-14

## 2023-01-14 RX ORDER — GADOTERATE MEGLUMINE 376.9 MG/ML
15 INJECTION INTRAVENOUS
Status: COMPLETED | OUTPATIENT
Start: 2023-01-14 | End: 2023-01-14

## 2023-01-14 RX ORDER — LACOSAMIDE 100 MG/1
100 TABLET ORAL 2 TIMES DAILY
Status: DISCONTINUED | OUTPATIENT
Start: 2023-01-15 | End: 2023-01-21

## 2023-01-14 NOTE — PLAN OF CARE
Pt is alert and oriented. VS and CIWA scale completed as ordered. MRI today - see results. Seizure precautions - sign on wall and rails up and padded. Rocephin and Thiamine given today. Plan - OT recommending [facility] ([id number]) pending insurance. Problem: Patient Centered Care  Goal: Patient preferences are identified and integrated in the patient's plan of care  Description: Interventions:  - What would you like us to know as we care for you? Spouse at hospital.   - Provide timely, complete, and accurate information to patient/family  - Incorporate patient and family knowledge, values, beliefs, and cultural backgrounds into the planning and delivery of care  - Encourage patient/family to participate in care and decision-making at the level they choose  - Honor patient and family perspectives and choices  Outcome: Progressing     Problem: Patient/Family Goals  Goal: Patient/Family Long Term Goal  Description: Patient's Long Term Goal: Maintain seizure free    Interventions:  - Continue ordered medication administration.  - See additional Care Plan goals for specific interventions  Outcome: Progressing  Goal: Patient/Family Short Term Goal  Description: Patient's Short Term Goal: Increase diet and drink regular water, not thickened. Interventions:   - Monitor pt's ability to swallow. - See additional Care Plan goals for specific interventions  Outcome: Progressing     Problem: CARDIOVASCULAR - ADULT  Goal: Maintains optimal cardiac output and hemodynamic stability  Description: INTERVENTIONS:  - Monitor vital signs, rhythm, and trends  - Monitor for bleeding, hypotension and signs of decreased cardiac output  - Evaluate effectiveness of vasoactive medications to optimize hemodynamic stability  - Monitor arterial and/or venous puncture sites for bleeding and/or hematoma  - Assess quality of pulses, skin color and temperature  - Assess for signs of decreased coronary artery perfusion - ex.  Angina  - Evaluate fluid balance, assess for edema, trend weights  Outcome: Progressing     Problem: GASTROINTESTINAL - ADULT  Goal: Minimal or absence of nausea and vomiting  Description: INTERVENTIONS:  - Maintain adequate hydration with IV or PO as ordered and tolerated  - Nasogastric tube to low intermittent suction as ordered  - Evaluate effectiveness of ordered antiemetic medications  - Provide nonpharmacologic comfort measures as appropriate  - Advance diet as tolerated, if ordered  - Obtain nutritional consult as needed  - Evaluate fluid balance  Outcome: Progressing     Problem: METABOLIC/FLUID AND ELECTROLYTES - ADULT  Goal: Electrolytes maintained within normal limits  Description: INTERVENTIONS:  - Monitor labs and rhythm and assess patient for signs and symptoms of electrolyte imbalances  - Administer electrolyte replacement as ordered  - Monitor response to electrolyte replacements, including rhythm and repeat lab results as appropriate  - Fluid restriction as ordered  - Instruct patient on fluid and nutrition restrictions as appropriate  Outcome: Progressing     Problem: SKIN/TISSUE INTEGRITY - ADULT  Goal: Skin integrity remains intact  Description: INTERVENTIONS  - Assess and document risk factors for pressure ulcer development  - Assess and document skin integrity  - Monitor for areas of redness and/or skin breakdown  - Initiate interventions, skin care algorithm/standards of care as needed  Outcome: Progressing     Problem: MUSCULOSKELETAL - ADULT  Goal: Return mobility to safest level of function  Description: INTERVENTIONS:  - Assess patient stability and activity tolerance for standing, transferring and ambulating w/ or w/o assistive devices  - Assist with transfers and ambulation using safe patient handling equipment as needed  - Ensure adequate protection for wounds/incisions during mobilization  - Obtain PT/OT consults as needed  - Advance activity as appropriate  - Communicate ordered activity level and limitations with patient/family  Outcome: Progressing     Problem: Impaired Functional Mobility  Goal: Achieve highest/safest level of mobility/gait  Description: Interventions:  - Assess patient's functional ability and stability  - Promote increasing activity/tolerance for mobility and gait  - Educate and engage patient/family in tolerated activity level and precautions  Outcome: Progressing     Problem: Impaired Activities of Daily Living  Goal: Achieve highest/safest level of independence in self care  Description: Interventions:  - Assess ability and encourage patient to participate in ADLs to maximize function  - Promote sitting position while performing ADLs such as feeding, grooming, and bathing  - Educate and encourage patient/family in tolerated functional activity level and precautions during self-care  Outcome: Progressing     Problem: Impaired Swallowing  Goal: Minimize aspiration risk  Description: Interventions:  - Patient should be alert and upright for all feedings (90 degrees preferred)  - Offer food and liquids at a slow rate  - No straws  - Encourage small bites of food and small sips of liquid  - Offer pills one at a time, crush or deliver with applesauce as needed  - Discontinue feeding and notify MD (or speech pathologist) if coughing or persistent throat clearing or wet/gurgly vocal quality is noted  Outcome: Progressing     Problem: Impaired Cognition  Goal: Patient will exhibit improved attention, thought processing and/or memory  Description: Interventions:  - Encourage discussion with patient  - Include patient in their own care  Outcome: Progressing

## 2023-01-14 NOTE — PROGRESS NOTES
Pt  Likely has a subdural empyema. Spoke with Dr. Gianni Villalobos (neurosurgery). Agree pt needs MRI w/ contrast. Will order w contrast only. PO sedation given motion artifact. Pt is on ceftriaxone which has good cns penetration. Has been on abx since 1/10/23. Dr. Rich Burger will contact infectious disease. 1. Intracranial abscess  - Consult to Neurosurgery; Standing  - Consult to Neurosurgery  - MRI BRAIN(CONTRAST ONLY) (CPT=70552); Standing        2. Focal motor seizure (HCC)  - lacosamide (Vimpat) 200 mg/20mL injection 200 mg  - lacosamide (Vimpat) tab 100 mg  - Consult to Neurosurgery; Standing  - Consult to Neurosurgery  - MRI BRAIN(CONTRAST ONLY) (CPT=70552);  Standing    Hood Kirk DO  [facility]

## 2023-01-14 NOTE — PLAN OF CARE
At the start of shift Rodolfo was drowsy and slightly confused. Patients wife came out of the room in the middle of the night and stated patient was having a seizure. Per other RN-patient was having seizure like movements but by the time I got to the room they had resolved and the patient was alert and speaking. Per tele- HR was slighty tachy at the time- between 105-115's. Notified MD, Patient seen by nocturnalist, Neuro put on consult but no further orders received, Unclear if this was a true seizure as patient is now much more awake and alert x4. Patient up to the chair w/ lift per request. CIWA's done per protocol. IVF running. Ice packs given for R knee. Educated patient to call for assistance, call light within reach. Problem: Patient Centered Care  Goal: Patient preferences are identified and integrated in the patient's plan of care  Description: Interventions:  - What would you like us to know as we care for you?   - Provide timely, complete, and accurate information to patient/family  - Incorporate patient and family knowledge, values, beliefs, and cultural backgrounds into the planning and delivery of care  - Encourage patient/family to participate in care and decision-making at the level they choose  - Honor patient and family perspectives and choices  Outcome: Progressing        Problem: CARDIOVASCULAR - ADULT  Goal: Maintains optimal cardiac output and hemodynamic stability  Description: INTERVENTIONS:  - Monitor vital signs, rhythm, and trends  - Monitor for bleeding, hypotension and signs of decreased cardiac output  - Evaluate effectiveness of vasoactive medications to optimize hemodynamic stability  - Monitor arterial and/or venous puncture sites for bleeding and/or hematoma  - Assess quality of pulses, skin color and temperature  - Assess for signs of decreased coronary artery perfusion - ex.  Angina  - Evaluate fluid balance, assess for edema, trend weights  Outcome: Progressing     Problem: GASTROINTESTINAL - ADULT  Goal: Minimal or absence of nausea and vomiting  Description: INTERVENTIONS:  - Maintain adequate hydration with IV or PO as ordered and tolerated  - Nasogastric tube to low intermittent suction as ordered  - Evaluate effectiveness of ordered antiemetic medications  - Provide nonpharmacologic comfort measures as appropriate  - Advance diet as tolerated, if ordered  - Obtain nutritional consult as needed  - Evaluate fluid balance  Outcome: Progressing     Problem: METABOLIC/FLUID AND ELECTROLYTES - ADULT  Goal: Electrolytes maintained within normal limits  Description: INTERVENTIONS:  - Monitor labs and rhythm and assess patient for signs and symptoms of electrolyte imbalances  - Administer electrolyte replacement as ordered  - Monitor response to electrolyte replacements, including rhythm and repeat lab results as appropriate  - Fluid restriction as ordered  - Instruct patient on fluid and nutrition restrictions as appropriate  Outcome: Progressing     Problem: SKIN/TISSUE INTEGRITY - ADULT  Goal: Skin integrity remains intact  Description: INTERVENTIONS  - Assess and document risk factors for pressure ulcer development  - Assess and document skin integrity  - Monitor for areas of redness and/or skin breakdown  - Initiate interventions, skin care algorithm/standards of care as needed  Outcome: Progressing     Problem: MUSCULOSKELETAL - ADULT  Goal: Return mobility to safest level of function  Description: INTERVENTIONS:  - Assess patient stability and activity tolerance for standing, transferring and ambulating w/ or w/o assistive devices  - Assist with transfers and ambulation using safe patient handling equipment as needed  - Ensure adequate protection for wounds/incisions during mobilization  - Obtain PT/OT consults as needed  - Advance activity as appropriate  - Communicate ordered activity level and limitations with patient/family  Outcome: Progressing     Problem: Impaired Functional Mobility  Goal: Achieve highest/safest level of mobility/gait  Description: Interventions:  - Assess patient's functional ability and stability  - Promote increasing activity/tolerance for mobility and gait  - Educate and engage patient/family in tolerated activity level and precautions    Outcome: Progressing     Problem: Impaired Activities of Daily Living  Goal: Achieve highest/safest level of independence in self care  Description: Interventions:  - Assess ability and encourage patient to participate in ADLs to maximize function  - Promote sitting position while performing ADLs such as feeding, grooming, and bathing  - Educate and encourage patient/family in tolerated functional activity level and precautions during self-care    Outcome: Progressing     Problem: Impaired Swallowing  Goal: Minimize aspiration risk  Description: Interventions:  - Patient should be alert and upright for all feedings (90 degrees preferred)  - Offer food and liquids at a slow rate  - No straws  - Encourage small bites of food and small sips of liquid  - Offer pills one at a time, crush or deliver with applesauce as needed  - Discontinue feeding and notify MD (or speech pathologist) if coughing or persistent throat clearing or wet/gurgly vocal quality is noted  Outcome: Progressing     Problem: Impaired Cognition  Goal: Patient will exhibit improved attention, thought processing and/or memory  Description: Interventions:    Outcome: Progressing

## 2023-01-15 LAB
ANION GAP SERPL CALC-SCNC: 5 MMOL/L (ref 0–18)
ATRIAL RATE: 81 BPM
BASOPHILS # BLD AUTO: 0.06 X10(3) UL (ref 0–0.2)
BASOPHILS NFR BLD AUTO: 0.3 %
BUN BLD-MCNC: 13 MG/DL (ref 7–18)
BUN/CREAT SERPL: 30.2 (ref 10–20)
CALCIUM BLD-MCNC: 7.9 MG/DL (ref 8.5–10.1)
CHLORIDE SERPL-SCNC: 108 MMOL/L (ref 98–112)
CO2 SERPL-SCNC: 25 MMOL/L (ref 21–32)
CREAT BLD-MCNC: 0.43 MG/DL
DEPRECATED RDW RBC AUTO: 43.5 FL (ref 35.1–46.3)
DRUG CONFIRMATION,CANNABINOIDS: 122 NG/ML
EOSINOPHIL # BLD AUTO: 0.01 X10(3) UL (ref 0–0.7)
EOSINOPHIL NFR BLD AUTO: 0 %
ERYTHROCYTE [DISTWIDTH] IN BLOOD BY AUTOMATED COUNT: 14.2 % (ref 11–15)
GFR SERPLBLD BASED ON 1.73 SQ M-ARVRAT: 138 ML/MIN/1.73M2 (ref 60–?)
GLUCOSE BLD-MCNC: 115 MG/DL (ref 70–99)
HCT VFR BLD AUTO: 25.9 %
HGB BLD-MCNC: 9.1 G/DL
IMM GRANULOCYTES # BLD AUTO: 0.37 X10(3) UL (ref 0–1)
IMM GRANULOCYTES NFR BLD: 1.8 %
LYMPHOCYTES # BLD AUTO: 3.09 X10(3) UL (ref 1–4)
LYMPHOCYTES NFR BLD AUTO: 15 %
MCH RBC QN AUTO: 29.6 PG (ref 26–34)
MCHC RBC AUTO-ENTMCNC: 35.1 G/DL (ref 31–37)
MCV RBC AUTO: 84.4 FL
MONOCYTES # BLD AUTO: 1.22 X10(3) UL (ref 0.1–1)
MONOCYTES NFR BLD AUTO: 5.9 %
NEUTROPHILS # BLD AUTO: 15.82 X10 (3) UL (ref 1.5–7.7)
NEUTROPHILS # BLD AUTO: 15.82 X10(3) UL (ref 1.5–7.7)
NEUTROPHILS NFR BLD AUTO: 77 %
OSMOLALITY SERPL CALC.SUM OF ELEC: 287 MOSM/KG (ref 275–295)
P AXIS: 66 DEGREES
P-R INTERVAL: 138 MS
PLATELET # BLD AUTO: 516 10(3)UL (ref 150–450)
POTASSIUM SERPL-SCNC: 4.6 MMOL/L (ref 3.5–5.1)
Q-T INTERVAL: 388 MS
QRS DURATION: 84 MS
QTC CALCULATION (BEZET): 450 MS
R AXIS: 51 DEGREES
RBC # BLD AUTO: 3.07 X10(6)UL
SODIUM SERPL-SCNC: 138 MMOL/L (ref 136–145)
T AXIS: 49 DEGREES
VENTRICULAR RATE: 81 BPM
WBC # BLD AUTO: 20.6 X10(3) UL (ref 4–11)

## 2023-01-15 PROCEDURE — 99232 SBSQ HOSP IP/OBS MODERATE 35: CPT | Performed by: INTERNAL MEDICINE

## 2023-01-15 PROCEDURE — 99233 SBSQ HOSP IP/OBS HIGH 50: CPT | Performed by: OTHER

## 2023-01-15 NOTE — IMAGING NOTE
Neurosurgery    Discussed with Dr. Marcos Hui  MRI reviewed. Thin (5mm) small high right parietal collection consistent with SD empyema. Anticipate nonoperative mgt  Full consult to follow.

## 2023-01-15 NOTE — PLAN OF CARE
Patient is alert, oriented x 4. Started on IV Vancomycin, Metronidazole and Ceftriaxone increased to twice a day for intracranial abscess. No fever or seizure activity. No adverse reactions noted. Neuro q 4 with no change in cognitive/motor function. Given prn laxative for constipation but no BM overnight. Problem: Patient Centered Care  Goal: Patient preferences are identified and integrated in the patient's plan of care  Description: Interventions:  - What would you like us to know as we care for you?   - Provide timely, complete, and accurate information to patient/family  - Incorporate patient and family knowledge, values, beliefs, and cultural backgrounds into the planning and delivery of care  - Encourage patient/family to participate in care and decision-making at the level they choose  - Honor patient and family perspectives and choices  Outcome: Progressing    Problem: CARDIOVASCULAR - ADULT  Goal: Maintains optimal cardiac output and hemodynamic stability  Description: INTERVENTIONS:  - Monitor vital signs, rhythm, and trends  - Monitor for bleeding, hypotension and signs of decreased cardiac output  - Evaluate effectiveness of vasoactive medications to optimize hemodynamic stability  - Monitor arterial and/or venous puncture sites for bleeding and/or hematoma  - Assess quality of pulses, skin color and temperature  - Assess for signs of decreased coronary artery perfusion - ex.  Angina  - Evaluate fluid balance, assess for edema, trend weights  Outcome: Progressing     Problem: GASTROINTESTINAL - ADULT  Goal: Minimal or absence of nausea and vomiting  Description: INTERVENTIONS:  - Maintain adequate hydration with IV or PO as ordered and tolerated  - Nasogastric tube to low intermittent suction as ordered  - Evaluate effectiveness of ordered antiemetic medications  - Provide nonpharmacologic comfort measures as appropriate  - Advance diet as tolerated, if ordered  - Obtain nutritional consult as needed  - Evaluate fluid balance  Outcome: Progressing     Problem: METABOLIC/FLUID AND ELECTROLYTES - ADULT  Goal: Electrolytes maintained within normal limits  Description: INTERVENTIONS:  - Monitor labs and rhythm and assess patient for signs and symptoms of electrolyte imbalances  - Administer electrolyte replacement as ordered  - Monitor response to electrolyte replacements, including rhythm and repeat lab results as appropriate  - Fluid restriction as ordered  - Instruct patient on fluid and nutrition restrictions as appropriate  Outcome: Progressing     Problem: SKIN/TISSUE INTEGRITY - ADULT  Goal: Skin integrity remains intact  Description: INTERVENTIONS  - Assess and document risk factors for pressure ulcer development  - Assess and document skin integrity  - Monitor for areas of redness and/or skin breakdown  - Initiate interventions, skin care algorithm/standards of care as needed  Outcome: Progressing     Problem: MUSCULOSKELETAL - ADULT  Goal: Return mobility to safest level of function  Description: INTERVENTIONS:  - Assess patient stability and activity tolerance for standing, transferring and ambulating w/ or w/o assistive devices  - Assist with transfers and ambulation using safe patient handling equipment as needed  - Ensure adequate protection for wounds/incisions during mobilization  - Obtain PT/OT consults as needed  - Advance activity as appropriate  - Communicate ordered activity level and limitations with patient/family  Outcome: Progressing     Problem: Impaired Functional Mobility  Goal: Achieve highest/safest level of mobility/gait  Description: Interventions:  - Assess patient's functional ability and stability  - Promote increasing activity/tolerance for mobility and gait  - Educate and engage patient/family in tolerated activity level and precautions  - Recommend use of  RW for transfers and ambulation  Outcome: Progressing     Problem: Impaired Activities of Daily Living  Goal: Achieve highest/safest level of independence in self care  Description: Interventions:  - Assess ability and encourage patient to participate in ADLs to maximize function  - Promote sitting position while performing ADLs such as feeding, grooming, and bathing  - Educate and encourage patient/family in tolerated functional activity level and precautions during self-care  - Encourage patient to incorporate impaired side during daily activities to promote function  Outcome: Progressing     Problem: Impaired Swallowing  Goal: Minimize aspiration risk  Description: Interventions:  - Patient should be alert and upright for all feedings (90 degrees preferred)  - Offer food and liquids at a slow rate  - No straws  - Encourage small bites of food and small sips of liquid  - Offer pills one at a time, crush or deliver with applesauce as needed  - Discontinue feeding and notify MD (or speech pathologist) if coughing or persistent throat clearing or wet/gurgly vocal quality is noted  Outcome: Progressing     Problem: Impaired Cognition  Goal: Patient will exhibit improved attention, thought processing and/or memory  Description: Interventions:  - Minimize distractions in the room when full attention is required  Outcome: Progressing

## 2023-01-15 NOTE — PLAN OF CARE
Problem: Patient Centered Care  Goal: Patient preferences are identified and integrated in the patient's plan of care  Description: Interventions:  - What would you like us to know as we care for you? Keep patient involved in her care   - Provide timely, complete, and accurate information to patient/family  - Incorporate patient and family knowledge, values, beliefs, and cultural backgrounds into the planning and delivery of care  - Encourage patient/family to participate in care and decision-making at the level they choose  - Honor patient and family perspectives and choices  Outcome: Progressing     Problem: Patient/Family Goals  Goal: Patient/Family Long Term Goal  Description: Patient's Long Term Goal: To go home     Interventions:  - Follow MD orders  - Follow medication regimen   - See additional Care Plan goals for specific interventions  Outcome: Progressing  Goal: Patient/Family Short Term Goal  Description: Patient's Short Term Goal: To remain pain free     Interventions:   - Follow MD orders  - Follow medication regimen   - See additional Care Plan goals for specific interventions  Outcome: Progressing     Problem: CARDIOVASCULAR - ADULT  Goal: Maintains optimal cardiac output and hemodynamic stability  Description: INTERVENTIONS:  - Monitor vital signs, rhythm, and trends  - Monitor for bleeding, hypotension and signs of decreased cardiac output  - Evaluate effectiveness of vasoactive medications to optimize hemodynamic stability  - Monitor arterial and/or venous puncture sites for bleeding and/or hematoma  - Assess quality of pulses, skin color and temperature  - Assess for signs of decreased coronary artery perfusion - ex.  Angina  - Evaluate fluid balance, assess for edema, trend weights  Outcome: Progressing     Problem: GASTROINTESTINAL - ADULT  Goal: Minimal or absence of nausea and vomiting  Description: INTERVENTIONS:  - Maintain adequate hydration with IV or PO as ordered and tolerated  - Nasogastric tube to low intermittent suction as ordered  - Evaluate effectiveness of ordered antiemetic medications  - Provide nonpharmacologic comfort measures as appropriate  - Advance diet as tolerated, if ordered  - Obtain nutritional consult as needed  - Evaluate fluid balance  Outcome: Progressing     Problem: METABOLIC/FLUID AND ELECTROLYTES - ADULT  Goal: Electrolytes maintained within normal limits  Description: INTERVENTIONS:  - Monitor labs and rhythm and assess patient for signs and symptoms of electrolyte imbalances  - Administer electrolyte replacement as ordered  - Monitor response to electrolyte replacements, including rhythm and repeat lab results as appropriate  - Fluid restriction as ordered  - Instruct patient on fluid and nutrition restrictions as appropriate  Outcome: Progressing     Problem: SKIN/TISSUE INTEGRITY - ADULT  Goal: Skin integrity remains intact  Description: INTERVENTIONS  - Assess and document risk factors for pressure ulcer development  - Assess and document skin integrity  - Monitor for areas of redness and/or skin breakdown  - Initiate interventions, skin care algorithm/standards of care as needed  Outcome: Progressing     Problem: MUSCULOSKELETAL - ADULT  Goal: Return mobility to safest level of function  Description: INTERVENTIONS:  - Assess patient stability and activity tolerance for standing, transferring and ambulating w/ or w/o assistive devices  - Assist with transfers and ambulation using safe patient handling equipment as needed  - Ensure adequate protection for wounds/incisions during mobilization  - Obtain PT/OT consults as needed  - Advance activity as appropriate  - Communicate ordered activity level and limitations with patient/family  Outcome: Progressing     Problem: Impaired Functional Mobility  Goal: Achieve highest/safest level of mobility/gait  Description: Interventions:  - Assess patient's functional ability and stability  - Promote increasing activity/tolerance for mobility and gait  - Educate and engage patient/family in tolerated activity level and precautions    Outcome: Progressing     Problem: Impaired Activities of Daily Living  Goal: Achieve highest/safest level of independence in self care  Description: Interventions:  - Assess ability and encourage patient to participate in ADLs to maximize function  - Promote sitting position while performing ADLs such as feeding, grooming, and bathing  - Educate and encourage patient/family in tolerated functional activity level and precautions during self-care    Outcome: Progressing     Problem: Impaired Swallowing  Goal: Minimize aspiration risk  Description: Interventions:  - Patient should be alert and upright for all feedings (90 degrees preferred)  - Offer food and liquids at a slow rate  - No straws  - Encourage small bites of food and small sips of liquid  - Offer pills one at a time, crush or deliver with applesauce as needed  - Discontinue feeding and notify MD (or speech pathologist) if coughing or persistent throat clearing or wet/gurgly vocal quality is noted  Outcome: Progressing     Problem: Impaired Cognition  Goal: Patient will exhibit improved attention, thought processing and/or memory  Description: Interventions:    Outcome: Progressing   Patient is resting in bed; vitals are stable. Patient is alert and oriented; on room air; normal sinus rhythm. Patient has had no complaints of pain. Patient and significant other was updated on plan of care along with why patient was transferred to CCU. Bed is locked; in the lowest position; call light is within reach. Patient is staying involved in his care.

## 2023-01-16 LAB
ANION GAP SERPL CALC-SCNC: 9 MMOL/L (ref 0–18)
BASOPHILS # BLD AUTO: 0.03 X10(3) UL (ref 0–0.2)
BASOPHILS NFR BLD AUTO: 0.1 %
BUN BLD-MCNC: 12 MG/DL (ref 7–18)
BUN/CREAT SERPL: 21.1 (ref 10–20)
CALCIUM BLD-MCNC: 7.8 MG/DL (ref 8.5–10.1)
CHLORIDE SERPL-SCNC: 108 MMOL/L (ref 98–112)
CO2 SERPL-SCNC: 23 MMOL/L (ref 21–32)
CREAT BLD-MCNC: 0.57 MG/DL
CRP SERPL-MCNC: 3.74 MG/DL (ref ?–0.3)
DEPRECATED RDW RBC AUTO: 44 FL (ref 35.1–46.3)
EOSINOPHIL # BLD AUTO: 0.08 X10(3) UL (ref 0–0.7)
EOSINOPHIL NFR BLD AUTO: 0.4 %
ERYTHROCYTE [DISTWIDTH] IN BLOOD BY AUTOMATED COUNT: 14.1 % (ref 11–15)
ERYTHROCYTE [SEDIMENTATION RATE] IN BLOOD: 80 MM/HR
GFR SERPLBLD BASED ON 1.73 SQ M-ARVRAT: 126 ML/MIN/1.73M2 (ref 60–?)
GLUCOSE BLD-MCNC: 148 MG/DL (ref 70–99)
HCT VFR BLD AUTO: 25.8 %
HGB BLD-MCNC: 9.1 G/DL
IMM GRANULOCYTES # BLD AUTO: 0.41 X10(3) UL (ref 0–1)
IMM GRANULOCYTES NFR BLD: 1.9 %
LYMPHOCYTES # BLD AUTO: 3.51 X10(3) UL (ref 1–4)
LYMPHOCYTES NFR BLD AUTO: 16.7 %
MCH RBC QN AUTO: 30.4 PG (ref 26–34)
MCHC RBC AUTO-ENTMCNC: 35.3 G/DL (ref 31–37)
MCV RBC AUTO: 86.3 FL
MONOCYTES # BLD AUTO: 1.47 X10(3) UL (ref 0.1–1)
MONOCYTES NFR BLD AUTO: 7 %
NEUTROPHILS # BLD AUTO: 15.58 X10 (3) UL (ref 1.5–7.7)
NEUTROPHILS # BLD AUTO: 15.58 X10(3) UL (ref 1.5–7.7)
NEUTROPHILS NFR BLD AUTO: 73.9 %
OSMOLALITY SERPL CALC.SUM OF ELEC: 293 MOSM/KG (ref 275–295)
PLATELET # BLD AUTO: 558 10(3)UL (ref 150–450)
POTASSIUM SERPL-SCNC: 3.8 MMOL/L (ref 3.5–5.1)
RBC # BLD AUTO: 2.99 X10(6)UL
SODIUM SERPL-SCNC: 140 MMOL/L (ref 136–145)
VANCOMYCIN PEAK SERPL-MCNC: 23.2 UG/ML (ref 30–50)
VANCOMYCIN TROUGH SERPL-MCNC: 8.1 UG/ML (ref 10–20)
WBC # BLD AUTO: 21.1 X10(3) UL (ref 4–11)

## 2023-01-16 PROCEDURE — 99232 SBSQ HOSP IP/OBS MODERATE 35: CPT | Performed by: STUDENT IN AN ORGANIZED HEALTH CARE EDUCATION/TRAINING PROGRAM

## 2023-01-16 PROCEDURE — 95816 EEG AWAKE AND DROWSY: CPT | Performed by: OTHER

## 2023-01-16 PROCEDURE — 99233 SBSQ HOSP IP/OBS HIGH 50: CPT | Performed by: INTERNAL MEDICINE

## 2023-01-16 PROCEDURE — 99233 SBSQ HOSP IP/OBS HIGH 50: CPT | Performed by: OTHER

## 2023-01-16 RX ORDER — POTASSIUM CHLORIDE 1.5 G/1.77G
40 POWDER, FOR SOLUTION ORAL ONCE
Status: COMPLETED | OUTPATIENT
Start: 2023-01-16 | End: 2023-01-16

## 2023-01-16 RX ORDER — VANCOMYCIN 2 GRAM/500 ML IN 0.9 % SODIUM CHLORIDE INTRAVENOUS
2000 EVERY 12 HOURS
Status: DISCONTINUED | OUTPATIENT
Start: 2023-01-16 | End: 2023-01-17

## 2023-01-16 NOTE — PLAN OF CARE
PT resting in bed overnight, wife at bedside. PT got up to bathroom and had a BM. VSS. No significant events overnight. Bed locked in lowest position, call light within reach. Problem: Patient Centered Care  Goal: Patient preferences are identified and integrated in the patient's plan of care  Description: Interventions:  - What would you like us to know as we care for you?   - Provide timely, complete, and accurate information to patient/family  - Incorporate patient and family knowledge, values, beliefs, and cultural backgrounds into the planning and delivery of care  - Encourage patient/family to participate in care and decision-making at the level they choose  - Honor patient and family perspectives and choices  Outcome: Progressing     Problem: Patient/Family Goals  Goal: Patient/Family Long Term Goal  Description: Patient's Long Term Goal:     Interventions:  -   - See additional Care Plan goals for specific interventions  Outcome: Progressing  Goal: Patient/Family Short Term Goal  Description: Patient's Short Term Goal:     Interventions:   -   - See additional Care Plan goals for specific interventions  Outcome: Progressing     Problem: CARDIOVASCULAR - ADULT  Goal: Maintains optimal cardiac output and hemodynamic stability  Description: INTERVENTIONS:  - Monitor vital signs, rhythm, and trends  - Monitor for bleeding, hypotension and signs of decreased cardiac output  - Evaluate effectiveness of vasoactive medications to optimize hemodynamic stability  - Monitor arterial and/or venous puncture sites for bleeding and/or hematoma  - Assess quality of pulses, skin color and temperature  - Assess for signs of decreased coronary artery perfusion - ex.  Angina  - Evaluate fluid balance, assess for edema, trend weights  Outcome: Progressing     Problem: GASTROINTESTINAL - ADULT  Goal: Minimal or absence of nausea and vomiting  Description: INTERVENTIONS:  - Maintain adequate hydration with IV or PO as ordered and tolerated  - Nasogastric tube to low intermittent suction as ordered  - Evaluate effectiveness of ordered antiemetic medications  - Provide nonpharmacologic comfort measures as appropriate  - Advance diet as tolerated, if ordered  - Obtain nutritional consult as needed  - Evaluate fluid balance  Outcome: Progressing     Problem: METABOLIC/FLUID AND ELECTROLYTES - ADULT  Goal: Electrolytes maintained within normal limits  Description: INTERVENTIONS:  - Monitor labs and rhythm and assess patient for signs and symptoms of electrolyte imbalances  - Administer electrolyte replacement as ordered  - Monitor response to electrolyte replacements, including rhythm and repeat lab results as appropriate  - Fluid restriction as ordered  - Instruct patient on fluid and nutrition restrictions as appropriate  Outcome: Progressing     Problem: SKIN/TISSUE INTEGRITY - ADULT  Goal: Skin integrity remains intact  Description: INTERVENTIONS  - Assess and document risk factors for pressure ulcer development  - Assess and document skin integrity  - Monitor for areas of redness and/or skin breakdown  - Initiate interventions, skin care algorithm/standards of care as needed  Outcome: Progressing     Problem: MUSCULOSKELETAL - ADULT  Goal: Return mobility to safest level of function  Description: INTERVENTIONS:  - Assess patient stability and activity tolerance for standing, transferring and ambulating w/ or w/o assistive devices  - Assist with transfers and ambulation using safe patient handling equipment as needed  - Ensure adequate protection for wounds/incisions during mobilization  - Obtain PT/OT consults as needed  - Advance activity as appropriate  - Communicate ordered activity level and limitations with patient/family  Outcome: Progressing     Problem: Impaired Functional Mobility  Goal: Achieve highest/safest level of mobility/gait  Description: Interventions:  - Assess patient's functional ability and stability  - Promote increasing activity/tolerance for mobility and gait  - Educate and engage patient/family in tolerated activity level and precautions    Outcome: Progressing     Problem: Impaired Activities of Daily Living  Goal: Achieve highest/safest level of independence in self care  Description: Interventions:  - Assess ability and encourage patient to participate in ADLs to maximize function  - Promote sitting position while performing ADLs such as feeding, grooming, and bathing  - Educate and encourage patient/family in tolerated functional activity level and precautions during self-care  Outcome: Progressing     Problem: Impaired Swallowing  Goal: Minimize aspiration risk  Description: Interventions:  - Patient should be alert and upright for all feedings (90 degrees preferred)  - Offer food and liquids at a slow rate  - No straws  - Encourage small bites of food and small sips of liquid  - Offer pills one at a time, crush or deliver with applesauce as needed  - Discontinue feeding and notify MD (or speech pathologist) if coughing or persistent throat clearing or wet/gurgly vocal quality is noted  Outcome: Progressing     Problem: Impaired Cognition  Goal: Patient will exhibit improved attention, thought processing and/or memory  Description: Interventions:  Outcome: Progressing

## 2023-01-16 NOTE — PLAN OF CARE
-Pt is alert and oriented x4  -Pt is on room air  -Pt is a max assist, pt. Did not stand up for me today  -continued IV antibitoics and meds  -patient transferred down to CCU per neurology and hospitalist   - gave report to CCU RN    Problem: Patient Centered Care  Goal: Patient preferences are identified and integrated in the patient's plan of care  Description: Interventions:  - What would you like us to know as we care for you?   - Provide timely, complete, and accurate information to patient/family  - Incorporate patient and family knowledge, values, beliefs, and cultural backgrounds into the planning and delivery of care  - Encourage patient/family to participate in care and decision-making at the level they choose  - Honor patient and family perspectives and choices  Outcome: Progressing     Problem: Patient/Family Goals  Goal: Patient/Family Long Term Goal  Description: Patient's Long Term Goal:     Interventions:  -   - See additional Care Plan goals for specific interventions  Outcome: Progressing  Goal: Patient/Family Short Term Goal  Description: Patient's Short Term Goal:     Interventions:   -   - See additional Care Plan goals for specific interventions  Outcome: Progressing     Problem: CARDIOVASCULAR - ADULT  Goal: Maintains optimal cardiac output and hemodynamic stability  Description: INTERVENTIONS:  - Monitor vital signs, rhythm, and trends  - Monitor for bleeding, hypotension and signs of decreased cardiac output  - Evaluate effectiveness of vasoactive medications to optimize hemodynamic stability  - Monitor arterial and/or venous puncture sites for bleeding and/or hematoma  - Assess quality of pulses, skin color and temperature  - Assess for signs of decreased coronary artery perfusion - ex.  Angina  - Evaluate fluid balance, assess for edema, trend weights  Outcome: Progressing     Problem: GASTROINTESTINAL - ADULT  Goal: Minimal or absence of nausea and vomiting  Description: INTERVENTIONS:  - Maintain adequate hydration with IV or PO as ordered and tolerated  - Nasogastric tube to low intermittent suction as ordered  - Evaluate effectiveness of ordered antiemetic medications  - Provide nonpharmacologic comfort measures as appropriate  - Advance diet as tolerated, if ordered  - Obtain nutritional consult as needed  - Evaluate fluid balance  Outcome: Progressing     Problem: METABOLIC/FLUID AND ELECTROLYTES - ADULT  Goal: Electrolytes maintained within normal limits  Description: INTERVENTIONS:  - Monitor labs and rhythm and assess patient for signs and symptoms of electrolyte imbalances  - Administer electrolyte replacement as ordered  - Monitor response to electrolyte replacements, including rhythm and repeat lab results as appropriate  - Fluid restriction as ordered  - Instruct patient on fluid and nutrition restrictions as appropriate  Outcome: Progressing     Problem: SKIN/TISSUE INTEGRITY - ADULT  Goal: Skin integrity remains intact  Description: INTERVENTIONS  - Assess and document risk factors for pressure ulcer development  - Assess and document skin integrity  - Monitor for areas of redness and/or skin breakdown  - Initiate interventions, skin care algorithm/standards of care as needed  Outcome: Progressing     Problem: MUSCULOSKELETAL - ADULT  Goal: Return mobility to safest level of function  Description: INTERVENTIONS:  - Assess patient stability and activity tolerance for standing, transferring and ambulating w/ or w/o assistive devices  - Assist with transfers and ambulation using safe patient handling equipment as needed  - Ensure adequate protection for wounds/incisions during mobilization  - Obtain PT/OT consults as needed  - Advance activity as appropriate  - Communicate ordered activity level and limitations with patient/family  Outcome: Progressing     Problem: Impaired Functional Mobility  Goal: Achieve highest/safest level of mobility/gait  Description: Interventions:  - Assess patient's functional ability and stability  - Promote increasing activity/tolerance for mobility and gait  - Educate and engage patient/family in tolerated activity level and precautions    Outcome: Progressing     Problem: Impaired Activities of Daily Living  Goal: Achieve highest/safest level of independence in self care  Description: Interventions:  - Assess ability and encourage patient to participate in ADLs to maximize function  - Promote sitting position while performing ADLs such as feeding, grooming, and bathing  - Educate and encourage patient/family in tolerated functional activity level and precautions during self-care    Outcome: Progressing     Problem: Impaired Swallowing  Goal: Minimize aspiration risk  Description: Interventions:  - Patient should be alert and upright for all feedings (90 degrees preferred)  - Offer food and liquids at a slow rate  - No straws  - Encourage small bites of food and small sips of liquid  - Offer pills one at a time, crush or deliver with applesauce as needed  - Discontinue feeding and notify MD (or speech pathologist) if coughing or persistent throat clearing or wet/gurgly vocal quality is noted  Outcome: Progressing     Problem: Impaired Cognition  Goal: Patient will exhibit improved attention, thought processing and/or memory  Description: Interventions:    Outcome: Progressing

## 2023-01-16 NOTE — CM/SW NOTE
PT rec acute. RN/MD notified. SW requested for PMR consult. Pt was reserved with [facility] SW notified [facility] of change in therapy rec. SW to send further referrals once PMR evaluates pt. PLAN: pending medical course & PMR eval - REHAB    SW remains available for support and/or discharge planning. Please do not hesitate to call/chat SW if further DC needs arise.      Geoff MCNAMARA, [place], [place]   Ext [phone]

## 2023-01-16 NOTE — PHYSICAL THERAPY NOTE
PHYSICAL THERAPY TREATMENT NOTE - INPATIENT     Room Number: 705/080-P       Presenting Problem: confusin, diarrhea, severe sepsis    Problem List  Principal Problem:    Severe sepsis ([facility] [address]  Active Problems:    Community acquired pneumonia of left lower lobe of lung    GUILLERMINA (acute kidney injury) ([facility] [address]    Hyponatremia    Focal motor seizure ([facility] [address]    Subdural empyema      PHYSICAL THERAPY ASSESSMENT   Chart reviewed. RN Bandar Garber approved participation in physical therapy. PPE worn by therapist: mask and gloves. Patient was wearing a mask during session. Patient presented in bed with unable to rate pain. Patient with good  progress towards goals during this session. Education provided on Physical therapy plan of care and physiological benefits of out of bed mobility. Patient with good carryover. Pt is received in the room with family and was cleared for therapy session. Co treat session with DALE Villalobos. Pt is very pleasant and motivated to work with therapy. Pt denied any dizziness and light headedness. Pt is SBA with bed mobility and to transfer to the EOB. Pt sat EOB for a few minutes with close SBA for sitting balance. Pt reported some pain in his R knee but did not quantify when asked. Pt is CGA/min A with sit<>stadn transfers with the RW. Pt was able to stand for about 2-3 minutes with CGA for standing balance and then sat back EOB to rest. Pt reported that he was feeling weak during activity. Pt then was able to AMB in the hallway with the RW and chair follow for safety. Pt with decreased kallie and step length but with improved mobility today. Returned pt back to the room and to sitting in the chair with all needs within reach. Reported to the RN on the status of the pt. Recommend pt would benefit and has the tolerance for acute rehab to return pt back to being IND.      Bed mobility: Supervision  Transfers: Min assist  Gait Assistance: Minimum assistance  Distance (ft): 75'  Assistive Device: Rolling walker Patient was left in bedside chair at end of session with all needs in reach. The patient's Approx Degree of Impairment: 50.57% has been calculated based on documentation in the [facility] '6 clicks' Inpatient Basic Mobility Short Form. Research supports that patients with this level of impairment may benefit from Acute Rehab. RN aware of patient status post session. DISCHARGE RECOMMENDATIONS  PT Discharge Recommendations: Acute rehabilitation     PLAN  PT Treatment Plan: Bed mobility; Body mechanics; Coordination; Endurance; Patient education;Gait training;Strengthening;Transfer training;Balance training;Energy conservation    SUBJECTIVE  Pt was agreeable to therapy session. OBJECTIVE  Precautions: Bed/chair alarm    WEIGHT BEARING RESTRICTION  Weight Bearing Restriction: None                PAIN ASSESSMENT   Rating: Unable to rate  Location: R knee  Management Techniques: Activity promotion; Body mechanics; Relaxation;Repositioning    BALANCE                                                                                                                       Static Sitting: Fair  Dynamic Sitting: Fair           Static Standing: Poor +  Dynamic Standing: Poor +    ACTIVITY TOLERANCE                         O2 WALK       AM-PAC '6-Clicks' INPATIENT SHORT FORM - BASIC MOBILITY  How much difficulty does the patient currently have. .. Patient Difficulty: Turning over in bed (including adjusting bedclothes, sheets and blankets)?: A Little   Patient Difficulty: Sitting down on and standing up from a chair with arms (e.g., wheelchair, bedside commode, etc.): A Little   Patient Difficulty: Moving from lying on back to sitting on the side of the bed?: A Little   How much help from another person does the patient currently need. ..    Help from Another: Moving to and from a bed to a chair (including a wheelchair)?: A Little   Help from Another: Need to walk in hospital room?: A Little   Help from Another: Climbing 3-5 steps with a railing?: A Lot     AM-PAC Score:  Raw Score: 17   Approx Degree of Impairment: 50.57%   Standardized Score (AM-PAC Scale): 42.13   CMS Modifier (G-Code): CK      Patient End of Session: Up in chair;Needs met;Call light within reach;RN aware of session/findings; All patient questions and concerns addressed; Family present    CURRENT GOALS     Goals to be met by: 23  Patient Goal Patient's self-stated goal is: none stated   Goal #1 Patient is able to demonstrate supine - sit EOB @ level: modified independent     Goal #1   Current Status SBA   Goal #2 Patient is able to demonstrate transfers Sit to/from Stand at assistance level: modified independent with walker - rolling     Goal #2  Current Status Min A with the RW   Goal #3 Patient is able to ambulate 75 feet with assist device: walker - rolling at assistance level: modified independent   Goal #3   Current Status 76' with the RW min and with chair follow for safety. Goal #4 Patient will tolerate static standing for 5 minutes with BUE support on RW and CGA in order to prepare for future ambulation. Goal #4   Current Status Stood for about 2 minutes with CGA and RW. Goal #5 Patient to demonstrate independence with home activity/exercise instructions provided to patient in preparation for discharge.    Goal #5   Current Status IN PROGRESS         PT Session Time: 30 minutes  Gait Trainin  minutes  Therapeutic Activity: 25 minutes

## 2023-01-17 ENCOUNTER — APPOINTMENT (OUTPATIENT)
Dept: GENERAL RADIOLOGY | Facility: HOSPITAL | Age: 43
End: 2023-01-17
Attending: Other
Payer: MEDICAID

## 2023-01-17 ENCOUNTER — APPOINTMENT (OUTPATIENT)
Dept: MRI IMAGING | Facility: HOSPITAL | Age: 43
End: 2023-01-17
Attending: NEUROLOGICAL SURGERY
Payer: MEDICAID

## 2023-01-17 LAB
BASOPHILS # BLD AUTO: 0.03 X10(3) UL (ref 0–0.2)
BASOPHILS NFR BLD AUTO: 0.1 %
BASOPHILS NFR CSF: 0 %
COLOR CSF: COLORLESS
CRYPTOCOCCAL ANTIGEN CSF: NEGATIVE
DEPRECATED RDW RBC AUTO: 44.1 FL (ref 35.1–46.3)
EOSINOPHIL # BLD AUTO: 0.03 X10(3) UL (ref 0–0.7)
EOSINOPHIL NFR BLD AUTO: 0.1 %
EOSINOPHIL NFR CSF: 0 %
ERYTHROCYTE [DISTWIDTH] IN BLOOD BY AUTOMATED COUNT: 14.4 % (ref 11–15)
GLUCOSE CSF-MCNC: 69 MG/DL (ref 40–70)
HCT VFR BLD AUTO: 25.9 %
HGB BLD-MCNC: 9.1 G/DL
IMM GRANULOCYTES # BLD AUTO: 0.37 X10(3) UL (ref 0–1)
IMM GRANULOCYTES NFR BLD: 1.7 %
LYMPHOCYTES # BLD AUTO: 2.31 X10(3) UL (ref 1–4)
LYMPHOCYTES NFR BLD AUTO: 10.5 %
LYMPHOCYTES NFR CSF: 84 % (ref 40–80)
MCH RBC QN AUTO: 29.8 PG (ref 26–34)
MCHC RBC AUTO-ENTMCNC: 35.1 G/DL (ref 31–37)
MCV RBC AUTO: 84.9 FL
MONOCYTES # BLD AUTO: 1.15 X10(3) UL (ref 0.1–1)
MONOCYTES NFR BLD AUTO: 5.2 %
MONOS+MACROS NFR CSF: 13 % (ref 15–45)
NEUTROPHILS # BLD AUTO: 18.05 X10 (3) UL (ref 1.5–7.7)
NEUTROPHILS # BLD AUTO: 18.05 X10(3) UL (ref 1.5–7.7)
NEUTROPHILS NFR BLD AUTO: 82.4 %
NEUTROPHILS NFR CSF: 3 % (ref 0–6)
PLATELET # BLD AUTO: 603 10(3)UL (ref 150–450)
POTASSIUM SERPL-SCNC: 4.1 MMOL/L (ref 3.5–5.1)
PROT PATTERN CSF ELPH-IMP: 24.2 MG/DL (ref 15–45)
RBC # BLD AUTO: 3.05 X10(6)UL
RBC # CSF: 1 /CUMM (ref ?–1)
TOTAL CELLS COUNTED CSF: 6 /CUMM (ref 0–5)
TOTAL CELLS COUNTED FLD: 69
TOTAL VOLUME CSF: 14.5 ML
TUBE # CSF: 3
TURBIDITY CSF QL: CLEAR
WBC # BLD AUTO: 21.9 X10(3) UL (ref 4–11)
WBC # CSF: 6 /CUMM

## 2023-01-17 PROCEDURE — 62328 DX LMBR SPI PNXR W/FLUOR/CT: CPT | Performed by: OTHER

## 2023-01-17 PROCEDURE — 70553 MRI BRAIN STEM W/O & W/DYE: CPT | Performed by: NEUROLOGICAL SURGERY

## 2023-01-17 PROCEDURE — 99232 SBSQ HOSP IP/OBS MODERATE 35: CPT | Performed by: INTERNAL MEDICINE

## 2023-01-17 PROCEDURE — 009U3ZX DRAINAGE OF SPINAL CANAL, PERCUTANEOUS APPROACH, DIAGNOSTIC: ICD-10-PCS | Performed by: RADIOLOGY

## 2023-01-17 PROCEDURE — B01B1ZZ FLUOROSCOPY OF SPINAL CORD USING LOW OSMOLAR CONTRAST: ICD-10-PCS | Performed by: RADIOLOGY

## 2023-01-17 PROCEDURE — 99232 SBSQ HOSP IP/OBS MODERATE 35: CPT | Performed by: STUDENT IN AN ORGANIZED HEALTH CARE EDUCATION/TRAINING PROGRAM

## 2023-01-17 RX ORDER — GADOTERATE MEGLUMINE 376.9 MG/ML
15 INJECTION INTRAVENOUS
Status: COMPLETED | OUTPATIENT
Start: 2023-01-17 | End: 2023-01-17

## 2023-01-17 NOTE — CM/SW NOTE
Department  notified of request for Acute Rehab, aidin referrals started. Assigned CM/SW to follow up with pt/family on further discharge planning.      Alfredo Poon   January 17, 2023   09:04

## 2023-01-17 NOTE — PLAN OF CARE
Received pt on room air, alert and oriented. Pt ambulate in forrester with PT/OT and up to the bathroom for 2x BM. Pt's appetite is good, eating most of meals. Plan of MRI. Spouse at bedside and participated in care. Bed locked and in lowest position, call light within reach. Problem: Patient Centered Care  Goal: Patient preferences are identified and integrated in the patient's plan of care  Description: Interventions:  - What would you like us to know as we care for you?   - Provide timely, complete, and accurate information to patient/family  - Incorporate patient and family knowledge, values, beliefs, and cultural backgrounds into the planning and delivery of care  - Encourage patient/family to participate in care and decision-making at the level they choose  - Honor patient and family perspectives and choices  Outcome: Progressing     Problem: Patient/Family Goals  Goal: Patient/Family Long Term Goal  Description: Patient's Long Term Goal:     Interventions:  -   - See additional Care Plan goals for specific interventions  Outcome: Progressing  Goal: Patient/Family Short Term Goal  Description: Patient's Short Term Goal:     Interventions:   -   - See additional Care Plan goals for specific interventions  Outcome: Progressing     Problem: CARDIOVASCULAR - ADULT  Goal: Maintains optimal cardiac output and hemodynamic stability  Description: INTERVENTIONS:  - Monitor vital signs, rhythm, and trends  - Monitor for bleeding, hypotension and signs of decreased cardiac output  - Evaluate effectiveness of vasoactive medications to optimize hemodynamic stability  - Monitor arterial and/or venous puncture sites for bleeding and/or hematoma  - Assess quality of pulses, skin color and temperature  - Assess for signs of decreased coronary artery perfusion - ex.  Angina  - Evaluate fluid balance, assess for edema, trend weights  Outcome: Progressing     Problem: GASTROINTESTINAL - ADULT  Goal: Minimal or absence of nausea and vomiting  Description: INTERVENTIONS:  - Maintain adequate hydration with IV or PO as ordered and tolerated  - Nasogastric tube to low intermittent suction as ordered  - Evaluate effectiveness of ordered antiemetic medications  - Provide nonpharmacologic comfort measures as appropriate  - Advance diet as tolerated, if ordered  - Obtain nutritional consult as needed  - Evaluate fluid balance  Outcome: Progressing     Problem: METABOLIC/FLUID AND ELECTROLYTES - ADULT  Goal: Electrolytes maintained within normal limits  Description: INTERVENTIONS:  - Monitor labs and rhythm and assess patient for signs and symptoms of electrolyte imbalances  - Administer electrolyte replacement as ordered  - Monitor response to electrolyte replacements, including rhythm and repeat lab results as appropriate  - Fluid restriction as ordered  - Instruct patient on fluid and nutrition restrictions as appropriate  Outcome: Progressing     Problem: SKIN/TISSUE INTEGRITY - ADULT  Goal: Skin integrity remains intact  Description: INTERVENTIONS  - Assess and document risk factors for pressure ulcer development  - Assess and document skin integrity  - Monitor for areas of redness and/or skin breakdown  - Initiate interventions, skin care algorithm/standards of care as needed  Outcome: Progressing     Problem: MUSCULOSKELETAL - ADULT  Goal: Return mobility to safest level of function  Description: INTERVENTIONS:  - Assess patient stability and activity tolerance for standing, transferring and ambulating w/ or w/o assistive devices  - Assist with transfers and ambulation using safe patient handling equipment as needed  - Ensure adequate protection for wounds/incisions during mobilization  - Obtain PT/OT consults as needed  - Advance activity as appropriate  - Communicate ordered activity level and limitations with patient/family  Outcome: Progressing     Problem: Impaired Functional Mobility  Goal: Achieve highest/safest level of mobility/gait  Description: Interventions:  - Assess patient's functional ability and stability  - Promote increasing activity/tolerance for mobility and gait  - Educate and engage patient/family in tolerated activity level and precautions    Outcome: Progressing     Problem: Impaired Activities of Daily Living  Goal: Achieve highest/safest level of independence in self care  Description: Interventions:  - Assess ability and encourage patient to participate in ADLs to maximize function  - Promote sitting position while performing ADLs such as feeding, grooming, and bathing  - Educate and encourage patient/family in tolerated functional activity level and precautions during self-care  - Encourage patient to incorporate impaired side during daily activities to promote function  Outcome: Progressing     Problem: Impaired Swallowing  Goal: Minimize aspiration risk  Description: Interventions:  - Patient should be alert and upright for all feedings (90 degrees preferred)  - Offer food and liquids at a slow rate  - No straws  - Encourage small bites of food and small sips of liquid  - Offer pills one at a time, crush or deliver with applesauce as needed  - Discontinue feeding and notify MD (or speech pathologist) if coughing or persistent throat clearing or wet/gurgly vocal quality is noted  Outcome: Progressing     Problem: Impaired Cognition  Goal: Patient will exhibit improved attention, thought processing and/or memory  Description: Interventions:  - Minimize distractions in the room when full attention is required  Outcome: Progressing

## 2023-01-17 NOTE — CM/SW NOTE
Department  notified of request for [address] and Infusion , aidin referrals started. Assigned CM/SW to follow up with pt/family on further discharge planning.      Candice Ragsdales   January 17, 2023   11:34

## 2023-01-17 NOTE — SLP NOTE
SLP attempt this AM. Pt off unit for MRI. SLP to follow up as pt available and/or as schedule allows. Thank you. Maritza [facility]  Speech Language Pathologist  Phone Number Ext. [phone]

## 2023-01-18 ENCOUNTER — APPOINTMENT (OUTPATIENT)
Dept: PICC SERVICES | Facility: HOSPITAL | Age: 43
End: 2023-01-18
Attending: PHYSICIAN ASSISTANT
Payer: MEDICAID

## 2023-01-18 PROCEDURE — 99232 SBSQ HOSP IP/OBS MODERATE 35: CPT | Performed by: STUDENT IN AN ORGANIZED HEALTH CARE EDUCATION/TRAINING PROGRAM

## 2023-01-18 PROCEDURE — 02HV33Z INSERTION OF INFUSION DEVICE INTO SUPERIOR VENA CAVA, PERCUTANEOUS APPROACH: ICD-10-PCS | Performed by: INTERNAL MEDICINE

## 2023-01-18 PROCEDURE — 99232 SBSQ HOSP IP/OBS MODERATE 35: CPT | Performed by: INTERNAL MEDICINE

## 2023-01-18 RX ORDER — LIDOCAINE HYDROCHLORIDE 10 MG/ML
5 INJECTION, SOLUTION EPIDURAL; INFILTRATION; INTRACAUDAL; PERINEURAL
Status: COMPLETED | OUTPATIENT
Start: 2023-01-18 | End: 2023-01-18

## 2023-01-18 NOTE — PLAN OF CARE
Problem: Patient Centered Care  Goal: Patient preferences are identified and integrated in the patient's plan of care  Description: Interventions:  - What would you like us to know as we care for you?   - Provide timely, complete, and accurate information to patient/family  - Incorporate patient and family knowledge, values, beliefs, and cultural backgrounds into the planning and delivery of care  - Encourage patient/family to participate in care and decision-making at the level they choose  - Honor patient and family perspectives and choices  Outcome: Progressing     Problem: Patient/Family Goals  Goal: Patient/Family Long Term Goal  Description: Patient's Long Term Goal:     Interventions:  -   - See additional Care Plan goals for specific interventions  Outcome: Progressing  Goal: Patient/Family Short Term Goal  Description: Patient's Short Term Goal:     Interventions:   -   - See additional Care Plan goals for specific interventions  Outcome: Progressing     Problem: CARDIOVASCULAR - ADULT  Goal: Maintains optimal cardiac output and hemodynamic stability  Description: INTERVENTIONS:  - Monitor vital signs, rhythm, and trends  - Monitor for bleeding, hypotension and signs of decreased cardiac output  - Evaluate effectiveness of vasoactive medications to optimize hemodynamic stability  - Monitor arterial and/or venous puncture sites for bleeding and/or hematoma  - Assess quality of pulses, skin color and temperature  - Assess for signs of decreased coronary artery perfusion - ex.  Angina  - Evaluate fluid balance, assess for edema, trend weights  Outcome: Progressing     Problem: GASTROINTESTINAL - ADULT  Goal: Minimal or absence of nausea and vomiting  Description: INTERVENTIONS:  - Maintain adequate hydration with IV or PO as ordered and tolerated  - Nasogastric tube to low intermittent suction as ordered  - Evaluate effectiveness of ordered antiemetic medications  - Provide nonpharmacologic comfort measures as appropriate  - Advance diet as tolerated, if ordered  - Obtain nutritional consult as needed  - Evaluate fluid balance  Outcome: Progressing     Problem: METABOLIC/FLUID AND ELECTROLYTES - ADULT  Goal: Electrolytes maintained within normal limits  Description: INTERVENTIONS:  - Monitor labs and rhythm and assess patient for signs and symptoms of electrolyte imbalances  - Administer electrolyte replacement as ordered  - Monitor response to electrolyte replacements, including rhythm and repeat lab results as appropriate  - Fluid restriction as ordered  - Instruct patient on fluid and nutrition restrictions as appropriate  Outcome: Progressing     Problem: SKIN/TISSUE INTEGRITY - ADULT  Goal: Skin integrity remains intact  Description: INTERVENTIONS  - Assess and document risk factors for pressure ulcer development  - Assess and document skin integrity  - Monitor for areas of redness and/or skin breakdown  - Initiate interventions, skin care algorithm/standards of care as needed  Outcome: Progressing     Problem: MUSCULOSKELETAL - ADULT  Goal: Return mobility to safest level of function  Description: INTERVENTIONS:  - Assess patient stability and activity tolerance for standing, transferring and ambulating w/ or w/o assistive devices  - Assist with transfers and ambulation using safe patient handling equipment as needed  - Ensure adequate protection for wounds/incisions during mobilization  - Obtain PT/OT consults as needed  - Advance activity as appropriate  - Communicate ordered activity level and limitations with patient/family  Outcome: Progressing     Problem: Impaired Functional Mobility  Goal: Achieve highest/safest level of mobility/gait  Description: Interventions:  - Assess patient's functional ability and stability  - Promote increasing activity/tolerance for mobility and gait  - Educate and engage patient/family in tolerated activity level and precautions  - Recommend use of  joel-walker for transfers and ambulation  Outcome: Progressing     Problem: Impaired Activities of Daily Living  Goal: Achieve highest/safest level of independence in self care  Description: Interventions:  - Assess ability and encourage patient to participate in ADLs to maximize function  - Promote sitting position while performing ADLs such as feeding, grooming, and bathing  - Educate and encourage patient/family in tolerated functional activity level and precautions during self-care  - Provide support under elbow of weak side to prevent shoulder subluxation  Outcome: Progressing     Problem: Impaired Swallowing  Goal: Minimize aspiration risk  Description: Interventions:  - Patient should be alert and upright for all feedings (90 degrees preferred)  - Offer food and liquids at a slow rate  - No straws  - Encourage small bites of food and small sips of liquid  - Offer pills one at a time, crush or deliver with applesauce as needed  - Discontinue feeding and notify MD (or speech pathologist) if coughing or persistent throat clearing or wet/gurgly vocal quality is noted  Outcome: Progressing     Problem: Impaired Cognition  Goal: Patient will exhibit improved attention, thought processing and/or memory  Description: Interventions:  - Minimize distractions in the room when full attention is required  Outcome: Progressing   Patient alert and oriented x4. Neuro checks q4, no deficits. LP site c/d/I. Urinal at bedside. Plan for possible transfer to floor pending med clearance.

## 2023-01-18 NOTE — PROGRESS NOTES
Patient transferred to room 335, report called to nurse Dangelo Santiago, reviewed plan of care, current medications, plan for PICC line tomorrow, patient was informed and signed consent.  Ambulated to bathroom with standby assist, tolerated well, vitals within normal, neurological checks within normal.

## 2023-01-18 NOTE — CM/SW NOTE
Reviewed home health referral, no accepting agencies at this time. Additional referral sent via [employer]. 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 Patient will need to f/u with OP PT/OT if unable to secure home health agency. PMR/PT/OT following for possible Acute Rehab, SW to continue to follow.     [place] Staff, [id number]

## 2023-01-18 NOTE — PLAN OF CARE
A&OX4, on room air. PICC placed in RUE today. IV antibiotics continued. Pt updated on plan of care. All questions answered. Problem: Patient Centered Care  Goal: Patient preferences are identified and integrated in the patient's plan of care  Description: Interventions:  - What would you like us to know as we care for you? I live with my GF  - Provide timely, complete, and accurate information to patient/family  - Incorporate patient and family knowledge, values, beliefs, and cultural backgrounds into the planning and delivery of care  - Encourage patient/family to participate in care and decision-making at the level they choose  - Honor patient and family perspectives and choices  Outcome: Progressing     Problem: Patient/Family Goals  Goal: Patient/Family Long Term Goal  Description: Patient's Long Term Goal:     Interventions:  -   - See additional Care Plan goals for specific interventions  Outcome: Progressing  Goal: Patient/Family Short Term Goal  Description: Patient's Short Term Goal:     Interventions:   -   - See additional Care Plan goals for specific interventions  Outcome: Progressing     Problem: CARDIOVASCULAR - ADULT  Goal: Maintains optimal cardiac output and hemodynamic stability  Description: INTERVENTIONS:  - Monitor vital signs, rhythm, and trends  - Monitor for bleeding, hypotension and signs of decreased cardiac output  - Evaluate effectiveness of vasoactive medications to optimize hemodynamic stability  - Monitor arterial and/or venous puncture sites for bleeding and/or hematoma  - Assess quality of pulses, skin color and temperature  - Assess for signs of decreased coronary artery perfusion - ex.  Angina  - Evaluate fluid balance, assess for edema, trend weights  Outcome: Progressing     Problem: GASTROINTESTINAL - ADULT  Goal: Minimal or absence of nausea and vomiting  Description: INTERVENTIONS:  - Maintain adequate hydration with IV or PO as ordered and tolerated  - Nasogastric tube to low intermittent suction as ordered  - Evaluate effectiveness of ordered antiemetic medications  - Provide nonpharmacologic comfort measures as appropriate  - Advance diet as tolerated, if ordered  - Obtain nutritional consult as needed  - Evaluate fluid balance  Outcome: Progressing     Problem: METABOLIC/FLUID AND ELECTROLYTES - ADULT  Goal: Electrolytes maintained within normal limits  Description: INTERVENTIONS:  - Monitor labs and rhythm and assess patient for signs and symptoms of electrolyte imbalances  - Administer electrolyte replacement as ordered  - Monitor response to electrolyte replacements, including rhythm and repeat lab results as appropriate  - Fluid restriction as ordered  - Instruct patient on fluid and nutrition restrictions as appropriate  Outcome: Progressing     Problem: SKIN/TISSUE INTEGRITY - ADULT  Goal: Skin integrity remains intact  Description: INTERVENTIONS  - Assess and document risk factors for pressure ulcer development  - Assess and document skin integrity  - Monitor for areas of redness and/or skin breakdown  - Initiate interventions, skin care algorithm/standards of care as needed  Outcome: Progressing     Problem: MUSCULOSKELETAL - ADULT  Goal: Return mobility to safest level of function  Description: INTERVENTIONS:  - Assess patient stability and activity tolerance for standing, transferring and ambulating w/ or w/o assistive devices  - Assist with transfers and ambulation using safe patient handling equipment as needed  - Ensure adequate protection for wounds/incisions during mobilization  - Obtain PT/OT consults as needed  - Advance activity as appropriate  - Communicate ordered activity level and limitations with patient/family  Outcome: Progressing     Problem: Impaired Functional Mobility  Goal: Achieve highest/safest level of mobility/gait  Description: Interventions:  - Assess patient's functional ability and stability  - Promote increasing activity/tolerance for mobility and gait  - Educate and engage patient/family in tolerated activity level and precautions  - Recommend use of chair position in bed 3 times per day  Outcome: Progressing     Problem: Impaired Activities of Daily Living  Goal: Achieve highest/safest level of independence in self care  Description: Interventions:  - Assess ability and encourage patient to participate in ADLs to maximize function  - Promote sitting position while performing ADLs such as feeding, grooming, and bathing  - Educate and encourage patient/family in tolerated functional activity level and precautions during self-care  - Encourage patient to incorporate impaired side during daily activities to promote function  Outcome: Progressing     Problem: Impaired Swallowing  Goal: Minimize aspiration risk  Description: Interventions:  - Patient should be alert and upright for all feedings (90 degrees preferred)  - Offer food and liquids at a slow rate  - No straws  - Encourage small bites of food and small sips of liquid  - Offer pills one at a time, crush or deliver with applesauce as needed  - Discontinue feeding and notify MD (or speech pathologist) if coughing or persistent throat clearing or wet/gurgly vocal quality is noted  Outcome: Progressing     Problem: Impaired Cognition  Goal: Patient will exhibit improved attention, thought processing and/or memory  Description: Interventions:  - Allow additional time for processing after asking questions or providing instructions  Outcome: Progressing

## 2023-01-18 NOTE — PLAN OF CARE
Received pt alert and oriented x4, on room air. Neuro checks Q4, as charted. Pt had an MRI and lumbar puncture done today, see note. Pt's appetite is good. Bed locked and in lowest position, call light within reach, family update via phone. Problem: Patient Centered Care  Goal: Patient preferences are identified and integrated in the patient's plan of care  Description: Interventions:  - What would you like us to know as we care for you?   - Provide timely, complete, and accurate information to patient/family  - Incorporate patient and family knowledge, values, beliefs, and cultural backgrounds into the planning and delivery of care  - Encourage patient/family to participate in care and decision-making at the level they choose  - Honor patient and family perspectives and choices  Outcome: Progressing     Problem: Patient/Family Goals  Goal: Patient/Family Long Term Goal  Description: Patient's Long Term Goal:     Interventions:  -   - See additional Care Plan goals for specific interventions  Outcome: Progressing  Goal: Patient/Family Short Term Goal  Description: Patient's Short Term Goal:     Interventions:   - - See additional Care Plan goals for specific interventions  Outcome: Progressing     Problem: CARDIOVASCULAR - ADULT  Goal: Maintains optimal cardiac output and hemodynamic stability  Description: INTERVENTIONS:  - Monitor vital signs, rhythm, and trends  - Monitor for bleeding, hypotension and signs of decreased cardiac output  - Evaluate effectiveness of vasoactive medications to optimize hemodynamic stability  - Monitor arterial and/or venous puncture sites for bleeding and/or hematoma  - Assess quality of pulses, skin color and temperature  - Assess for signs of decreased coronary artery perfusion - ex.  Angina  - Evaluate fluid balance, assess for edema, trend weights  Outcome: Progressing     Problem: GASTROINTESTINAL - ADULT  Goal: Minimal or absence of nausea and vomiting  Description: INTERVENTIONS:  - Maintain adequate hydration with IV or PO as ordered and tolerated  - Nasogastric tube to low intermittent suction as ordered  - Evaluate effectiveness of ordered antiemetic medications  - Provide nonpharmacologic comfort measures as appropriate  - Advance diet as tolerated, if ordered  - Obtain nutritional consult as needed  - Evaluate fluid balance  Outcome: Progressing     Problem: METABOLIC/FLUID AND ELECTROLYTES - ADULT  Goal: Electrolytes maintained within normal limits  Description: INTERVENTIONS:  - Monitor labs and rhythm and assess patient for signs and symptoms of electrolyte imbalances  - Administer electrolyte replacement as ordered  - Monitor response to electrolyte replacements, including rhythm and repeat lab results as appropriate  - Fluid restriction as ordered  - Instruct patient on fluid and nutrition restrictions as appropriate  Outcome: Progressing     Problem: SKIN/TISSUE INTEGRITY - ADULT  Goal: Skin integrity remains intact  Description: INTERVENTIONS  - Assess and document risk factors for pressure ulcer development  - Assess and document skin integrity  - Monitor for areas of redness and/or skin breakdown  - Initiate interventions, skin care algorithm/standards of care as needed  Outcome: Progressing     Problem: MUSCULOSKELETAL - ADULT  Goal: Return mobility to safest level of function  Description: INTERVENTIONS:  - Assess patient stability and activity tolerance for standing, transferring and ambulating w/ or w/o assistive devices  - Assist with transfers and ambulation using safe patient handling equipment as needed  - Ensure adequate protection for wounds/incisions during mobilization  - Obtain PT/OT consults as needed  - Advance activity as appropriate  - Communicate ordered activity level and limitations with patient/family  Outcome: Progressing     Problem: Impaired Functional Mobility  Goal: Achieve highest/safest level of mobility/gait  Description: Interventions:  - Assess patient's functional ability and stability  - Promote increasing activity/tolerance for mobility and gait  - Educate and engage patient/family in tolerated activity level and precautions  Outcome: Progressing     Problem: Impaired Activities of Daily Living  Goal: Achieve highest/safest level of independence in self care  Description: Interventions:  - Assess ability and encourage patient to participate in ADLs to maximize function  - Promote sitting position while performing ADLs such as feeding, grooming, and bathing  - Educate and encourage patient/family in tolerated functional activity level and precautions during self-care    Outcome: Progressing     Problem: Impaired Swallowing  Goal: Minimize aspiration risk  Description: Interventions:  - Patient should be alert and upright for all feedings (90 degrees preferred)  - Offer food and liquids at a slow rate  - No straws  - Encourage small bites of food and small sips of liquid  - Offer pills one at a time, crush or deliver with applesauce as needed  - Discontinue feeding and notify MD (or speech pathologist) if coughing or persistent throat clearing or wet/gurgly vocal quality is noted  Outcome: Progressing     Problem: Impaired Cognition  Goal: Patient will exhibit improved attention, thought processing and/or memory  Description: Interventions:    Outcome: Progressing

## 2023-01-19 LAB
ANION GAP SERPL CALC-SCNC: 5 MMOL/L (ref 0–18)
BASOPHILS # BLD AUTO: 0.05 X10(3) UL (ref 0–0.2)
BASOPHILS NFR BLD AUTO: 0.2 %
BUN BLD-MCNC: 11 MG/DL (ref 7–18)
BUN/CREAT SERPL: 19 (ref 10–20)
CALCIUM BLD-MCNC: 8.8 MG/DL (ref 8.5–10.1)
CHLORIDE SERPL-SCNC: 104 MMOL/L (ref 98–112)
CO2 SERPL-SCNC: 26 MMOL/L (ref 21–32)
CREAT BLD-MCNC: 0.58 MG/DL
CRYPTOCOCCUS NEOFORMANS GATTII BY PCR: NOT DETECTED
CYTOMEGALVIRUS BY PCR: NOT DETECTED
DEPRECATED RDW RBC AUTO: 46.7 FL (ref 35.1–46.3)
ENTEROVIRUS BY PCR: NOT DETECTED
EOSINOPHIL # BLD AUTO: 0.06 X10(3) UL (ref 0–0.7)
EOSINOPHIL NFR BLD AUTO: 0.2 %
ERYTHROCYTE [DISTWIDTH] IN BLOOD BY AUTOMATED COUNT: 14.8 % (ref 11–15)
ESCHERICHIA COLI K1 BY PCR: NOT DETECTED
GFR SERPLBLD BASED ON 1.73 SQ M-ARVRAT: 126 ML/MIN/1.73M2 (ref 60–?)
GLUCOSE BLD-MCNC: 149 MG/DL (ref 70–99)
HAEMOPHILUS INFLUENZAE BY PCR: NOT DETECTED
HCT VFR BLD AUTO: 28.6 %
HERPES SIMPLEX VIRUS 1 BY PCR: NOT DETECTED
HERPES SIMPLEX VIRUS 2 BY PCR: NOT DETECTED
HGB BLD-MCNC: 9.9 G/DL
HUMAN HERPESVIRUS 6 BY PCR: NOT DETECTED
HUMAN PARECHOVIRUS BY PCR: NOT DETECTED
IMM GRANULOCYTES # BLD AUTO: 0.24 X10(3) UL (ref 0–1)
IMM GRANULOCYTES NFR BLD: 0.9 %
LISTERIA MONOCYTOGENES BY PCR: NOT DETECTED
LYMPHOCYTES # BLD AUTO: 5.19 X10(3) UL (ref 1–4)
LYMPHOCYTES NFR BLD AUTO: 19.5 %
MCH RBC QN AUTO: 30 PG (ref 26–34)
MCHC RBC AUTO-ENTMCNC: 34.6 G/DL (ref 31–37)
MCV RBC AUTO: 86.7 FL
MONOCYTES # BLD AUTO: 1.7 X10(3) UL (ref 0.1–1)
MONOCYTES NFR BLD AUTO: 6.4 %
NEISSERIA MENINGITIDIS BY PCR: NOT DETECTED
NEUTROPHILS # BLD AUTO: 19.36 X10 (3) UL (ref 1.5–7.7)
NEUTROPHILS # BLD AUTO: 19.36 X10(3) UL (ref 1.5–7.7)
NEUTROPHILS NFR BLD AUTO: 72.8 %
OSMOLALITY SERPL CALC.SUM OF ELEC: 282 MOSM/KG (ref 275–295)
PLATELET # BLD AUTO: 724 10(3)UL (ref 150–450)
POTASSIUM SERPL-SCNC: 4.1 MMOL/L (ref 3.5–5.1)
RBC # BLD AUTO: 3.3 X10(6)UL
SODIUM SERPL-SCNC: 135 MMOL/L (ref 136–145)
STREPTOCOCCUS AGALACTIAE BY PCR: NOT DETECTED
STREPTOCOCCUS PNEUMONIAE BY PCR: NOT DETECTED
VARICELLA ZOSTER VIRUS BY PCR: NOT DETECTED
WBC # BLD AUTO: 26.6 X10(3) UL (ref 4–11)

## 2023-01-19 PROCEDURE — 99232 SBSQ HOSP IP/OBS MODERATE 35: CPT | Performed by: PHYSICIAN ASSISTANT

## 2023-01-19 PROCEDURE — 99232 SBSQ HOSP IP/OBS MODERATE 35: CPT | Performed by: OTHER

## 2023-01-19 PROCEDURE — 99232 SBSQ HOSP IP/OBS MODERATE 35: CPT | Performed by: INTERNAL MEDICINE

## 2023-01-19 RX ORDER — TRAMADOL HYDROCHLORIDE 50 MG/1
50 TABLET ORAL EVERY 6 HOURS PRN
Status: DISCONTINUED | OUTPATIENT
Start: 2023-01-19 | End: 2023-01-21

## 2023-01-19 RX ORDER — LACOSAMIDE 100 MG/1
100 TABLET ORAL 2 TIMES DAILY
Qty: 180 TABLET | Refills: 1 | Status: SHIPPED | OUTPATIENT
Start: 2023-01-19 | End: 2023-07-18

## 2023-01-19 NOTE — CM/SW NOTE
Per Dr Daisy Mccurdy the medication Lacosamide requires prior auth to call pharmacy. CM uploaded prior auth in [address] [place] [address]    Prior authorization pending response. CM/SW to remain available for support and/or discharge planning.     Johnny Saini RN, [facility]    Ext.  [phone]

## 2023-01-19 NOTE — PLAN OF CARE
RA. Low grade fever, will continue to monitor. Patient ambulating the halls and room independently. Patient does not want to go to rehab facility at discharge, prefers to go home with [facility] and outpatient [facility]. Support system at bedside. Problem: CARDIOVASCULAR - ADULT  Goal: Maintains optimal cardiac output and hemodynamic stability  Description: INTERVENTIONS:  - Monitor vital signs, rhythm, and trends  - Monitor for bleeding, hypotension and signs of decreased cardiac output  - Evaluate effectiveness of vasoactive medications to optimize hemodynamic stability  - Monitor arterial and/or venous puncture sites for bleeding and/or hematoma  - Assess quality of pulses, skin color and temperature  - Assess for signs of decreased coronary artery perfusion - ex.  Angina  - Evaluate fluid balance, assess for edema, trend weights  Outcome: Progressing     Problem: GASTROINTESTINAL - ADULT  Goal: Minimal or absence of nausea and vomiting  Description: INTERVENTIONS:  - Maintain adequate hydration with IV or PO as ordered and tolerated  - Nasogastric tube to low intermittent suction as ordered  - Evaluate effectiveness of ordered antiemetic medications  - Provide nonpharmacologic comfort measures as appropriate  - Advance diet as tolerated, if ordered  - Obtain nutritional consult as needed  - Evaluate fluid balance  Outcome: Progressing     Problem: METABOLIC/FLUID AND ELECTROLYTES - ADULT  Goal: Electrolytes maintained within normal limits  Description: INTERVENTIONS:  - Monitor labs and rhythm and assess patient for signs and symptoms of electrolyte imbalances  - Administer electrolyte replacement as ordered  - Monitor response to electrolyte replacements, including rhythm and repeat lab results as appropriate  - Fluid restriction as ordered  - Instruct patient on fluid and nutrition restrictions as appropriate  Outcome: Progressing     Problem: SKIN/TISSUE INTEGRITY - ADULT  Goal: Skin integrity remains intact  Description: INTERVENTIONS  - Assess and document risk factors for pressure ulcer development  - Assess and document skin integrity  - Monitor for areas of redness and/or skin breakdown  - Initiate interventions, skin care algorithm/standards of care as needed  Outcome: Progressing     Problem: MUSCULOSKELETAL - ADULT  Goal: Return mobility to safest level of function  Description: INTERVENTIONS:  - Assess patient stability and activity tolerance for standing, transferring and ambulating w/ or w/o assistive devices  - Assist with transfers and ambulation using safe patient handling equipment as needed  - Ensure adequate protection for wounds/incisions during mobilization  - Obtain PT/OT consults as needed  - Advance activity as appropriate  - Communicate ordered activity level and limitations with patient/family  Outcome: Progressing     Problem: Impaired Functional Mobility  Goal: Achieve highest/safest level of mobility/gait  Description: Interventions:  - Assess patient's functional ability and stability  - Promote increasing activity/tolerance for mobility and gait  - Educate and engage patient/family in tolerated activity level and precautions  - Recommend use of chair position in bed 3 times per day  Outcome: Progressing     Problem: Impaired Activities of Daily Living  Goal: Achieve highest/safest level of independence in self care  Description: Interventions:  - Assess ability and encourage patient to participate in ADLs to maximize function  - Promote sitting position while performing ADLs such as feeding, grooming, and bathing  - Educate and encourage patient/family in tolerated functional activity level and precautions during self-care  Outcome: Progressing     Problem: Impaired Swallowing  Goal: Minimize aspiration risk  Description: Interventions:  - Patient should be alert and upright for all feedings (90 degrees preferred)  - Offer food and liquids at a slow rate  - No straws  - Encourage small bites of food and small sips of liquid  - Offer pills one at a time, crush or deliver with applesauce as needed  - Discontinue feeding and notify MD (or speech pathologist) if coughing or persistent throat clearing or wet/gurgly vocal quality is noted  Outcome: Progressing     Problem: Impaired Cognition  Goal: Patient will exhibit improved attention, thought processing and/or memory  Description: Interventions:  - Consider use of a daily journal to improve memory and reduce confusion related to daily events and orientation  Outcome: Progressing

## 2023-01-19 NOTE — CM/SW NOTE
Received voicemail from [facility] rehab stating the acute rehab referral is under physician review. CM notified MD/RN and awaiting determination. CM noted MDO for \"[facility]\", CM sent secure epic message to MD regarding this order. CM spoke w/Alley YING, stating pt wants to go home. Final script pending for infusion center of choice. CM/SW to remain available for support and/or discharge planning.     Chula Cali RN, [facility]    Ext.  [phone]

## 2023-01-19 NOTE — PHYSICAL THERAPY NOTE
PHYSICAL THERAPY TREATMENT NOTE - INPATIENT     Room Number: 335/335-A       Presenting Problem: confusin, diarrhea, severe sepsis    Problem List  Principal Problem:    Severe sepsis ([facility] [place] [address]  Active Problems:    Community acquired pneumonia of left lower lobe of lung    GUILLERMINA (acute kidney injury) ([facility] [place] [address]    Hyponatremia    Focal motor seizure ([facility] [place] [address]    Subdural empyema      PHYSICAL THERAPY ASSESSMENT   Chart reviewed. RN Eun Moe approved participation in physical therapy. PPE worn by therapist: mask and gloves. Patient was wearing a mask during session. Patient presented in bed with did not rate pain. Patient with good  progress towards goals during this session. Education provided on Physical therapy plan of care and physiological benefits of out of bed mobility. Patient with good carryover. Pt is received in the bed with family present and was cleared for therapy session. Pt reported that he was felling much improved and mobility has improved. Pt is mod I with bed mobility and sit<>stand transfers with no AD. Pt was able to AMB about 500' with the RW SBA. Pt with decreased kallie and step length with narrow DUSTIN but with very good balance and safety awareness. Pt denied any dizziness and light headedness throughout the session. Returned pt back to the room and to sitting in the bed. Pt is encouraged to AMB with staff later this evening. Pt is left in the bed with all needs within reach. Pt is on track to dc to home once medically cleared. Reported to the RN on the status of the pt. Bed mobility: Modified independent  Transfers: Modified independent  Gait Assistance: Supervision  Distance (ft): 500'  Assistive Device: Other (Comment) (IV pole)  Pattern: Within Functional Limits          . Patient was left in bed at end of session with all needs in reach. The patient's Approx Degree of Impairment: 28.97% has been calculated based on documentation in the [facility] '6 clicks' Inpatient Basic Mobility Short Form. Research supports that patients with this level of impairment may benefit from Home with home health PT. RN aware of patient status post session. DISCHARGE RECOMMENDATIONS  PT Discharge Recommendations: Home with home health PT;24 hour care/supervision     PLAN  PT Treatment Plan: Bed mobility; Body mechanics; Coordination; Endurance; Patient education;Gait training;Strengthening;Stoop training;Stair training;Transfer training;Balance training    SUBJECTIVE  Pt was agreeable to therapy session. OBJECTIVE  Precautions: Bed/chair alarm    WEIGHT BEARING RESTRICTION  Weight Bearing Restriction: None                PAIN ASSESSMENT   Rating:  (did not rate)  Location: R knee  Management Techniques: Activity promotion; Body mechanics; Relaxation;Repositioning    BALANCE                                                                                                                       Static Sitting: Good  Dynamic Sitting: Fair +           Static Standing: Fair  Dynamic Standing: Fair    ACTIVITY TOLERANCE                         O2 WALK       AM-PAC '6-Clicks' INPATIENT SHORT FORM - BASIC MOBILITY  How much difficulty does the patient currently have. .. Patient Difficulty: Turning over in bed (including adjusting bedclothes, sheets and blankets)?: None   Patient Difficulty: Sitting down on and standing up from a chair with arms (e.g., wheelchair, bedside commode, etc.): None   Patient Difficulty: Moving from lying on back to sitting on the side of the bed?: None   How much help from another person does the patient currently need. ..    Help from Another: Moving to and from a bed to a chair (including a wheelchair)?: A Little   Help from Another: Need to walk in hospital room?: A Little   Help from Another: Climbing 3-5 steps with a railing?: A Little     AM-PAC Score:  Raw Score: 21   Approx Degree of Impairment: 28.97%   Standardized Score (AM-PAC Scale): 50.25   CMS Modifier (G-Code): EBENEZER        Patient End of Session: In bed;Needs met;Call light within reach;RN aware of session/findings; All patient questions and concerns addressed; Family present    CURRENT GOALS   Goals to be met by: 23  Patient Goal Patient's self-stated goal is: none stated   Goal #1 Patient is able to demonstrate supine - sit EOB @ level: modified independent     Goal #1   Current Status Mod I   Goal #2 Patient is able to demonstrate transfers Sit to/from Stand at assistance level: modified independent with walker - rolling     Goal #2  Current Status Mod I with no AD   Goal #3 Patient is able to ambulate 75 feet with assist device: walker - rolling at assistance level: modified independent   Goal #3   Current Status 500' with the RW SBA    Goal #4 Patient will tolerate static standing for 5 minutes with BUE support on RW and CGA in order to prepare for future ambulation. Goal #4   Current Status IN PROGRESS   Goal #5 Patient to demonstrate independence with home activity/exercise instructions provided to patient in preparation for discharge.    Goal #5   Current Status IN PROGRESS       PT Session Time: 8 minutes  Gait Trainin minutes

## 2023-01-19 NOTE — PLAN OF CARE
Problem: Patient Centered Care  Goal: Patient preferences are identified and integrated in the patient's plan of care  Description: Interventions:  - What would you like us to know as we care for you?   - Provide timely, complete, and accurate information to patient/family  - Incorporate patient and family knowledge, values, beliefs, and cultural backgrounds into the planning and delivery of care  - Encourage patient/family to participate in care and decision-making at the level they choose  - Honor patient and family perspectives and choices  Outcome: Progressing     Problem: Patient/Family Goals  Goal: Patient/Family Long Term Goal  Description: Patient's Long Term Goal:     Interventions:  -   - See additional Care Plan goals for specific interventions  Outcome: Progressing  Goal: Patient/Family Short Term Goal  Description: Patient's Short Term Goal:     Interventions:   -   - See additional Care Plan goals for specific interventions  Outcome: Progressing     Problem: CARDIOVASCULAR - ADULT  Goal: Maintains optimal cardiac output and hemodynamic stability  Description: INTERVENTIONS:  - Monitor vital signs, rhythm, and trends  - Monitor for bleeding, hypotension and signs of decreased cardiac output  - Evaluate effectiveness of vasoactive medications to optimize hemodynamic stability  - Monitor arterial and/or venous puncture sites for bleeding and/or hematoma  - Assess quality of pulses, skin color and temperature  - Assess for signs of decreased coronary artery perfusion - ex.  Angina  - Evaluate fluid balance, assess for edema, trend weights  Outcome: Progressing     Problem: GASTROINTESTINAL - ADULT  Goal: Minimal or absence of nausea and vomiting  Description: INTERVENTIONS:  - Maintain adequate hydration with IV or PO as ordered and tolerated  - Nasogastric tube to low intermittent suction as ordered  - Evaluate effectiveness of ordered antiemetic medications  - Provide nonpharmacologic comfort measures as appropriate  - Advance diet as tolerated, if ordered  - Obtain nutritional consult as needed  - Evaluate fluid balance  Outcome: Progressing     Problem: METABOLIC/FLUID AND ELECTROLYTES - ADULT  Goal: Electrolytes maintained within normal limits  Description: INTERVENTIONS:  - Monitor labs and rhythm and assess patient for signs and symptoms of electrolyte imbalances  - Administer electrolyte replacement as ordered  - Monitor response to electrolyte replacements, including rhythm and repeat lab results as appropriate  - Fluid restriction as ordered  - Instruct patient on fluid and nutrition restrictions as appropriate  Outcome: Progressing     Problem: SKIN/TISSUE INTEGRITY - ADULT  Goal: Skin integrity remains intact  Description: INTERVENTIONS  - Assess and document risk factors for pressure ulcer development  - Assess and document skin integrity  - Monitor for areas of redness and/or skin breakdown  - Initiate interventions, skin care algorithm/standards of care as needed  Outcome: Progressing     Problem: MUSCULOSKELETAL - ADULT  Goal: Return mobility to safest level of function  Description: INTERVENTIONS:  - Assess patient stability and activity tolerance for standing, transferring and ambulating w/ or w/o assistive devices  - Assist with transfers and ambulation using safe patient handling equipment as needed  - Ensure adequate protection for wounds/incisions during mobilization  - Obtain PT/OT consults as needed  - Advance activity as appropriate  - Communicate ordered activity level and limitations with patient/family  Outcome: Progressing     Problem: Impaired Functional Mobility  Goal: Achieve highest/safest level of mobility/gait  Description: Interventions:  - Assess patient's functional ability and stability  - Promote increasing activity/tolerance for mobility and gait  - Educate and engage patient/family in tolerated activity level and precautions    Outcome: Progressing     Problem: Impaired Activities of Daily Living  Goal: Achieve highest/safest level of independence in self care  Description: Interventions:  - Assess ability and encourage patient to participate in ADLs to maximize function  - Promote sitting position while performing ADLs such as feeding, grooming, and bathing  - Educate and encourage patient/family in tolerated functional activity level and precautions during self-care    Outcome: Progressing     Problem: Impaired Swallowing  Goal: Minimize aspiration risk  Description: Interventions:  - Patient should be alert and upright for all feedings (90 degrees preferred)  - Offer food and liquids at a slow rate  - No straws  - Encourage small bites of food and small sips of liquid  - Offer pills one at a time, crush or deliver with applesauce as needed  - Discontinue feeding and notify MD (or speech pathologist) if coughing or persistent throat clearing or wet/gurgly vocal quality is noted  Outcome: Progressing     Problem: Impaired Cognition  Goal: Patient will exhibit improved attention, thought processing and/or memory  Description: Interventions:    Outcome: Progressing   Patient alert and oriented x4. VSS. No neuro deficits noted. IV abx given. Plan for [facility] pending clearance.

## 2023-01-20 LAB
ALBUMIN SERPL-MCNC: 2.2 G/DL (ref 3.4–5)
ALBUMIN/GLOB SERPL: 0.4 {RATIO} (ref 1–2)
ALP LIVER SERPL-CCNC: 79 U/L
ALT SERPL-CCNC: 20 U/L
ANION GAP SERPL CALC-SCNC: 4 MMOL/L (ref 0–18)
AST SERPL-CCNC: 12 U/L (ref 15–37)
ATRIAL RATE: 89 BPM
BASOPHILS # BLD AUTO: 0.05 X10(3) UL (ref 0–0.2)
BASOPHILS NFR BLD AUTO: 0.3 %
BILIRUB SERPL-MCNC: 0.4 MG/DL (ref 0.1–2)
BUN BLD-MCNC: 9 MG/DL (ref 7–18)
BUN/CREAT SERPL: 18.8 (ref 10–20)
CALCIUM BLD-MCNC: 8.8 MG/DL (ref 8.5–10.1)
CHLORIDE SERPL-SCNC: 105 MMOL/L (ref 98–112)
CO2 SERPL-SCNC: 28 MMOL/L (ref 21–32)
CREAT BLD-MCNC: 0.48 MG/DL
DEPRECATED RDW RBC AUTO: 45.9 FL (ref 35.1–46.3)
EOSINOPHIL # BLD AUTO: 0.12 X10(3) UL (ref 0–0.7)
EOSINOPHIL NFR BLD AUTO: 0.6 %
ERYTHROCYTE [DISTWIDTH] IN BLOOD BY AUTOMATED COUNT: 14.9 % (ref 11–15)
GFR SERPLBLD BASED ON 1.73 SQ M-ARVRAT: 133 ML/MIN/1.73M2 (ref 60–?)
GLOBULIN PLAS-MCNC: 4.9 G/DL (ref 2.8–4.4)
GLUCOSE BLD-MCNC: 115 MG/DL (ref 70–99)
HCT VFR BLD AUTO: 26 %
HGB BLD-MCNC: 9.1 G/DL
IMM GRANULOCYTES # BLD AUTO: 0.17 X10(3) UL (ref 0–1)
IMM GRANULOCYTES NFR BLD: 0.9 %
LYMPHOCYTES # BLD AUTO: 4.31 X10(3) UL (ref 1–4)
LYMPHOCYTES NFR BLD AUTO: 22.8 %
MCH RBC QN AUTO: 29.7 PG (ref 26–34)
MCHC RBC AUTO-ENTMCNC: 35 G/DL (ref 31–37)
MCV RBC AUTO: 85 FL
MONOCYTES # BLD AUTO: 1.56 X10(3) UL (ref 0.1–1)
MONOCYTES NFR BLD AUTO: 8.3 %
NEUTROPHILS # BLD AUTO: 12.68 X10 (3) UL (ref 1.5–7.7)
NEUTROPHILS # BLD AUTO: 12.68 X10(3) UL (ref 1.5–7.7)
NEUTROPHILS NFR BLD AUTO: 67.1 %
OSMOLALITY SERPL CALC.SUM OF ELEC: 284 MOSM/KG (ref 275–295)
P AXIS: 59 DEGREES
P-R INTERVAL: 128 MS
PLATELET # BLD AUTO: 643 10(3)UL (ref 150–450)
POTASSIUM SERPL-SCNC: 4.4 MMOL/L (ref 3.5–5.1)
PROT SERPL-MCNC: 7.1 G/DL (ref 6.4–8.2)
Q-T INTERVAL: 348 MS
QRS DURATION: 82 MS
QTC CALCULATION (BEZET): 423 MS
R AXIS: 44 DEGREES
RBC # BLD AUTO: 3.06 X10(6)UL
SODIUM SERPL-SCNC: 137 MMOL/L (ref 136–145)
T AXIS: 51 DEGREES
VENTRICULAR RATE: 89 BPM
WBC # BLD AUTO: 18.9 X10(3) UL (ref 4–11)

## 2023-01-20 PROCEDURE — 99232 SBSQ HOSP IP/OBS MODERATE 35: CPT | Performed by: INTERNAL MEDICINE

## 2023-01-20 RX ORDER — VANCOMYCIN HYDROCHLORIDE 125 MG/1
125 CAPSULE ORAL DAILY
Status: DISCONTINUED | OUTPATIENT
Start: 2023-01-20 | End: 2023-01-21

## 2023-01-20 RX ORDER — CEFTRIAXONE SODIUM 2 G/50ML
2 INJECTION, SOLUTION INTRAVENOUS EVERY 24 HOURS
Qty: 1700 ML | Refills: 0 | Status: SHIPPED | OUTPATIENT
Start: 2023-01-20 | End: 2023-01-20

## 2023-01-20 RX ORDER — CEFTRIAXONE SODIUM 2 G/50ML
2 INJECTION, SOLUTION INTRAVENOUS EVERY 12 HOURS
Qty: 3400 ML | Refills: 0 | Status: SHIPPED | OUTPATIENT
Start: 2023-01-20 | End: 2023-02-23

## 2023-01-20 NOTE — CM/SW NOTE
SW was informed that pt was transferred up to 558. LEYLA met with pt in room to inform him that [facility] will be providing his IV ABX post discharge, and will be arranging an RN to be coming to the home to do teach and train, and weekly labs, and dressing changes. Pt expressed understanding. 359pm- LEYLA uploaded signed rx script into aidin for home infusion. Plan: Pending medical clearance, DC to Home with family, [facility] IV infusion and RN through [facility]. LEYLA/BRAYAN to remain available for support and/or discharge planning.      Star Jiménez MSW, LSW   x [phone]

## 2023-01-20 NOTE — CM/SW NOTE
F/U on [employer] prior auth. Auth still pending at this time. 1229: Fax received from [facility]. [employer] has been approved until 1/20/2024  DEONNA Barlow and BRAYAN Zacarias notified of approval via [address] Larisa Carey.  Barbara Flores RN, BSN  Nurse   [phone]

## 2023-01-20 NOTE — PLAN OF CARE
RA. Afebrile. Blood cultures pending. Call light within reach. Safety precautions in place. Plan: Home w/ [facility] when medically cleared, transfer to [address] [place] [address]. Report given to [facility] #[id number]. Problem: Patient Centered Care  Goal: Patient preferences are identified and integrated in the patient's plan of care  Description: Interventions:  - What would you like us to know as we care for you?  I live at home with my girlfriend   - Provide timely, complete, and accurate information to patient/family  - Incorporate patient and family knowledge, values, beliefs, and cultural backgrounds into the planning and delivery of care  - Encourage patient/family to participate in care and decision-making at the level they choose  - Honor patient and family perspectives and choices  1/20/2023 1005 by Stacie Donohue  Outcome: Progressing  1/20/2023 1003 by Stacie Donohue  Outcome: Progressing     Problem: Patient/Family Goals  Goal: Patient/Family Long Term Goal  Description: Patient's Long Term Goal: Go home     Interventions:  - Longterm IV antibiotics   - See additional Care Plan goals for specific interventions  1/20/2023 1005 by Stacie Donohue  Outcome: Progressing  1/20/2023 1003 by Stacie Donohue  Outcome: Progressing  Goal: Patient/Family Short Term Goal  Description: Patient's Short Term Goal: Feel better    Interventions:   - IV abx  -MRI   - See additional Care Plan goals for specific interventions  1/20/2023 1005 by Stacie Donohue  Outcome: Progressing  1/20/2023 1003 by Stacie Donohue  Outcome: Progressing     Problem: CARDIOVASCULAR - ADULT  Goal: Maintains optimal cardiac output and hemodynamic stability  Description: INTERVENTIONS:  - Monitor vital signs, rhythm, and trends  - Monitor for bleeding, hypotension and signs of decreased cardiac output  - Evaluate effectiveness of vasoactive medications to optimize hemodynamic stability  - Monitor arterial and/or venous puncture sites for bleeding and/or hematoma  - Assess quality of pulses, skin color and temperature  - Assess for signs of decreased coronary artery perfusion - ex.  Angina  - Evaluate fluid balance, assess for edema, trend weights  1/20/2023 1005 by Rafaela Dillon  Outcome: Progressing  1/20/2023 1003 by Rafaela Mayfieldis  Outcome: Progressing     Problem: GASTROINTESTINAL - ADULT  Goal: Minimal or absence of nausea and vomiting  Description: INTERVENTIONS:  - Maintain adequate hydration with IV or PO as ordered and tolerated  - Nasogastric tube to low intermittent suction as ordered  - Evaluate effectiveness of ordered antiemetic medications  - Provide nonpharmacologic comfort measures as appropriate  - Advance diet as tolerated, if ordered  - Obtain nutritional consult as needed  - Evaluate fluid balance  1/20/2023 1005 by Rafaela Dillon  Outcome: Progressing  1/20/2023 1003 by Rafaela Mayfieldis  Outcome: Progressing     Problem: METABOLIC/FLUID AND ELECTROLYTES - ADULT  Goal: Electrolytes maintained within normal limits  Description: INTERVENTIONS:  - Monitor labs and rhythm and assess patient for signs and symptoms of electrolyte imbalances  - Administer electrolyte replacement as ordered  - Monitor response to electrolyte replacements, including rhythm and repeat lab results as appropriate  - Fluid restriction as ordered  - Instruct patient on fluid and nutrition restrictions as appropriate  1/20/2023 1005 by Rafaela Dillon  Outcome: Progressing  1/20/2023 1003 by Rafaela Mayfieldis  Outcome: Progressing     Problem: SKIN/TISSUE INTEGRITY - ADULT  Goal: Skin integrity remains intact  Description: INTERVENTIONS  - Assess and document risk factors for pressure ulcer development  - Assess and document skin integrity  - Monitor for areas of redness and/or skin breakdown  - Initiate interventions, skin care algorithm/standards of care as needed  1/20/2023 1005 by Rafaela Dillon  Outcome: Progressing  1/20/2023 1003 by Rafaela Mayfieldis  Outcome: Progressing     Problem: MUSCULOSKELETAL - ADULT  Goal: Return mobility to safest level of function  Description: INTERVENTIONS:  - Assess patient stability and activity tolerance for standing, transferring and ambulating w/ or w/o assistive devices  - Assist with transfers and ambulation using safe patient handling equipment as needed  - Ensure adequate protection for wounds/incisions during mobilization  - Obtain PT/OT consults as needed  - Advance activity as appropriate  - Communicate ordered activity level and limitations with patient/family  1/20/2023 1005 by Ftaou Pouch  Outcome: Progressing  1/20/2023 1003 by Fatou Pouch  Outcome: Progressing     Problem: Impaired Functional Mobility  Goal: Achieve highest/safest level of mobility/gait  Description: Interventions:  - Assess patient's functional ability and stability  - Promote increasing activity/tolerance for mobility and gait  - Educate and engage patient/family in tolerated activity level and precautions  1/20/2023 1005 by Fatou Pouch  Outcome: Progressing  1/20/2023 1003 by Fatou Pouch  Outcome: Progressing     Problem: Impaired Activities of Daily Living  Goal: Achieve highest/safest level of independence in self care  Description: Interventions:  - Assess ability and encourage patient to participate in ADLs to maximize function  - Promote sitting position while performing ADLs such as feeding, grooming, and bathing  - Educate and encourage patient/family in tolerated functional activity level and precautions during self-care  1/20/2023 1005 by Fatou Pouch  Outcome: Progressing  1/20/2023 1003 by Fatou Pouch  Outcome: Progressing     Problem: Impaired Swallowing  Goal: Minimize aspiration risk  Description: Interventions:  - Patient should be alert and upright for all feedings (90 degrees preferred)  - Offer food and liquids at a slow rate  - No straws  - Encourage small bites of food and small sips of liquid  - Offer pills one at a time, crush or deliver with applesauce as needed  - Discontinue feeding and notify MD (or speech pathologist) if coughing or persistent throat clearing or wet/gurgly vocal quality is noted  1/20/2023 1005 by Star Cipro  Outcome: Progressing  1/20/2023 1003 by Star Cipro  Outcome: Progressing     Problem: Impaired Cognition  Goal: Patient will exhibit improved attention, thought processing and/or memory  Description: Interventions:  1/20/2023 1005 by Star Cipro  Outcome: Progressing  1/20/2023 1003 by Star Cipro  Outcome: Progressing

## 2023-01-20 NOTE — CM/SW NOTE
Was informed that patient wants to come home at dc. Just waiting final clearance with recent blood cultures. Duglas Ann should be ready this weekend. Home infusion and home nursing will be covered through [facility]. Script for IV infusion sent in Aidin. Need to send script for saline flushes once co-signed by KENNETH Gary. Contact phone number for Evelia Neal provided on sticky so when patient is ready possibly this weekend we can plan on discharge. Sheryl SW to speak with Duglas Ann and spouse on dc plans that have been put in placed. PLAN:  Awaiting clearance (recent BC completed). Home with family. [facility] have [facility] infusion and [facility]. Phone number for weekend [facility] staff placed on note. Hand off report given to [address] [place] [address] as patient has transferred to the 5th floor. / to remain available for support and/or discharge planning.      Hailey JUAREZN RN [address]  RN Case Manager  [phone]

## 2023-01-20 NOTE — PLAN OF CARE
Rodolfo alert x4, RA. Tramadol given for L chest 'soreness'. No acute events throughout the night. Iv abx running. Educated patient to call for assistance, call light within reach. Problem: Patient Centered Care  Goal: Patient preferences are identified and integrated in the patient's plan of care  Description: Interventions:  - What would you like us to know as we care for you?   - Provide timely, complete, and accurate information to patient/family  - Incorporate patient and family knowledge, values, beliefs, and cultural backgrounds into the planning and delivery of care  - Encourage patient/family to participate in care and decision-making at the level they choose  - Honor patient and family perspectives and choices  Outcome: Progressing        Problem: CARDIOVASCULAR - ADULT  Goal: Maintains optimal cardiac output and hemodynamic stability  Description: INTERVENTIONS:  - Monitor vital signs, rhythm, and trends  - Monitor for bleeding, hypotension and signs of decreased cardiac output  - Evaluate effectiveness of vasoactive medications to optimize hemodynamic stability  - Monitor arterial and/or venous puncture sites for bleeding and/or hematoma  - Assess quality of pulses, skin color and temperature  - Assess for signs of decreased coronary artery perfusion - ex.  Angina  - Evaluate fluid balance, assess for edema, trend weights  Outcome: Progressing     Problem: GASTROINTESTINAL - ADULT  Goal: Minimal or absence of nausea and vomiting  Description: INTERVENTIONS:  - Maintain adequate hydration with IV or PO as ordered and tolerated  - Nasogastric tube to low intermittent suction as ordered  - Evaluate effectiveness of ordered antiemetic medications  - Provide nonpharmacologic comfort measures as appropriate  - Advance diet as tolerated, if ordered  - Obtain nutritional consult as needed  - Evaluate fluid balance  Outcome: Progressing     Problem: METABOLIC/FLUID AND ELECTROLYTES - ADULT  Goal: Electrolytes maintained within normal limits  Description: INTERVENTIONS:  - Monitor labs and rhythm and assess patient for signs and symptoms of electrolyte imbalances  - Administer electrolyte replacement as ordered  - Monitor response to electrolyte replacements, including rhythm and repeat lab results as appropriate  - Fluid restriction as ordered  - Instruct patient on fluid and nutrition restrictions as appropriate  Outcome: Progressing     Problem: SKIN/TISSUE INTEGRITY - ADULT  Goal: Skin integrity remains intact  Description: INTERVENTIONS  - Assess and document risk factors for pressure ulcer development  - Assess and document skin integrity  - Monitor for areas of redness and/or skin breakdown  - Initiate interventions, skin care algorithm/standards of care as needed  Outcome: Progressing     Problem: MUSCULOSKELETAL - ADULT  Goal: Return mobility to safest level of function  Description: INTERVENTIONS:  - Assess patient stability and activity tolerance for standing, transferring and ambulating w/ or w/o assistive devices  - Assist with transfers and ambulation using safe patient handling equipment as needed  - Ensure adequate protection for wounds/incisions during mobilization  - Obtain PT/OT consults as needed  - Advance activity as appropriate  - Communicate ordered activity level and limitations with patient/family  Outcome: Progressing     Problem: Impaired Functional Mobility  Goal: Achieve highest/safest level of mobility/gait  Description: Interventions:  - Assess patient's functional ability and stability  - Promote increasing activity/tolerance for mobility and gait  - Educate and engage patient/family in tolerated activity level and precautions    Outcome: Progressing     Problem: Impaired Activities of Daily Living  Goal: Achieve highest/safest level of independence in self care  Description: Interventions:  - Assess ability and encourage patient to participate in ADLs to maximize function  - Promote sitting position while performing ADLs such as feeding, grooming, and bathing  - Educate and encourage patient/family in tolerated functional activity level and precautions during self-care    Outcome: Progressing     Problem: Impaired Swallowing  Goal: Minimize aspiration risk  Description: Interventions:  - Patient should be alert and upright for all feedings (90 degrees preferred)  - Offer food and liquids at a slow rate  - No straws  - Encourage small bites of food and small sips of liquid  - Offer pills one at a time, crush or deliver with applesauce as needed  - Discontinue feeding and notify MD (or speech pathologist) if coughing or persistent throat clearing or wet/gurgly vocal quality is noted  Outcome: Progressing     Problem: Impaired Cognition  Goal: Patient will exhibit improved attention, thought processing and/or memory  Description: Interventions:    Outcome: Progressing

## 2023-01-20 NOTE — PLAN OF CARE
Patient has safety precautions in place bed in the lowest position, bed alarm on, and call light within reach. Continue to monitor patient with intentional nursing rounds. Problem: Patient Centered Care  Goal: Patient preferences are identified and integrated in the patient's plan of care  Description: Interventions:  - What would you like us to know as we care for you? I live at home with my girlfriend   - Provide timely, complete, and accurate information to patient/family  - Incorporate patient and family knowledge, values, beliefs, and cultural backgrounds into the planning and delivery of care  - Encourage patient/family to participate in care and decision-making at the level they choose  - Honor patient and family perspectives and choices  Outcome: Progressing     Problem: Patient/Family Goals  Goal: Patient/Family Long Term Goal  Description: Patient's Long Term Goal: Go home     Interventions:  - Longterm IV antibiotics   - See additional Care Plan goals for specific interventions  Outcome: Progressing  Goal: Patient/Family Short Term Goal  Description: Patient's Short Term Goal: Feel better    Interventions:   - IV abx  -MRI   - See additional Care Plan goals for specific interventions  Outcome: Progressing     Problem: CARDIOVASCULAR - ADULT  Goal: Maintains optimal cardiac output and hemodynamic stability  Description: INTERVENTIONS:  - Monitor vital signs, rhythm, and trends  - Monitor for bleeding, hypotension and signs of decreased cardiac output  - Evaluate effectiveness of vasoactive medications to optimize hemodynamic stability  - Monitor arterial and/or venous puncture sites for bleeding and/or hematoma  - Assess quality of pulses, skin color and temperature  - Assess for signs of decreased coronary artery perfusion - ex.  Angina  - Evaluate fluid balance, assess for edema, trend weights  Outcome: Progressing     Problem: GASTROINTESTINAL - ADULT  Goal: Minimal or absence of nausea and vomiting  Description: INTERVENTIONS:  - Maintain adequate hydration with IV or PO as ordered and tolerated  - Nasogastric tube to low intermittent suction as ordered  - Evaluate effectiveness of ordered antiemetic medications  - Provide nonpharmacologic comfort measures as appropriate  - Advance diet as tolerated, if ordered  - Obtain nutritional consult as needed  - Evaluate fluid balance  Outcome: Progressing     Problem: METABOLIC/FLUID AND ELECTROLYTES - ADULT  Goal: Electrolytes maintained within normal limits  Description: INTERVENTIONS:  - Monitor labs and rhythm and assess patient for signs and symptoms of electrolyte imbalances  - Administer electrolyte replacement as ordered  - Monitor response to electrolyte replacements, including rhythm and repeat lab results as appropriate  - Fluid restriction as ordered  - Instruct patient on fluid and nutrition restrictions as appropriate  Outcome: Progressing     Problem: SKIN/TISSUE INTEGRITY - ADULT  Goal: Skin integrity remains intact  Description: INTERVENTIONS  - Assess and document risk factors for pressure ulcer development  - Assess and document skin integrity  - Monitor for areas of redness and/or skin breakdown  - Initiate interventions, skin care algorithm/standards of care as needed  Outcome: Progressing     Problem: MUSCULOSKELETAL - ADULT  Goal: Return mobility to safest level of function  Description: INTERVENTIONS:  - Assess patient stability and activity tolerance for standing, transferring and ambulating w/ or w/o assistive devices  - Assist with transfers and ambulation using safe patient handling equipment as needed  - Ensure adequate protection for wounds/incisions during mobilization  - Obtain PT/OT consults as needed  - Advance activity as appropriate  - Communicate ordered activity level and limitations with patient/family  Outcome: Progressing     Problem: Impaired Functional Mobility  Goal: Achieve highest/safest level of mobility/gait  Description: Interventions:  - Assess patient's functional ability and stability  - Promote increasing activity/tolerance for mobility and gait  - Educate and engage patient/family in tolerated activity level and precautions  - When transferring patient, block weaker knee for safety  Outcome: Progressing     Problem: Impaired Activities of Daily Living  Goal: Achieve highest/safest level of independence in self care  Description: Interventions:  - Assess ability and encourage patient to participate in ADLs to maximize function  - Promote sitting position while performing ADLs such as feeding, grooming, and bathing  - Educate and encourage patient/family in tolerated functional activity level and precautions during self-care  - Encourage patient to incorporate impaired side during daily activities to promote function  Outcome: Progressing     Problem: Impaired Swallowing  Goal: Minimize aspiration risk  Description: Interventions:  - Patient should be alert and upright for all feedings (90 degrees preferred)  - Offer food and liquids at a slow rate  - No straws  - Encourage small bites of food and small sips of liquid  - Offer pills one at a time, crush or deliver with applesauce as needed  - Discontinue feeding and notify MD (or speech pathologist) if coughing or persistent throat clearing or wet/gurgly vocal quality is noted  Outcome: Progressing     Problem: Impaired Cognition  Goal: Patient will exhibit improved attention, thought processing and/or memory  Description: Interventions:  - Minimize distractions in the room when full attention is required  Outcome: Progressing

## 2023-01-21 VITALS
BODY MASS INDEX: 23.57 KG/M2 | HEART RATE: 94 BPM | SYSTOLIC BLOOD PRESSURE: 112 MMHG | HEIGHT: 71 IN | RESPIRATION RATE: 18 BRPM | OXYGEN SATURATION: 99 % | DIASTOLIC BLOOD PRESSURE: 60 MMHG | TEMPERATURE: 98 F | WEIGHT: 168.38 LBS

## 2023-01-21 LAB — VITAMIN B1 (THIAMINE), WHOLE B: 241 NMOL/L

## 2023-01-21 PROCEDURE — 99232 SBSQ HOSP IP/OBS MODERATE 35: CPT | Performed by: INTERNAL MEDICINE

## 2023-01-21 RX ORDER — ALLOPURINOL 100 MG/1
100 TABLET ORAL DAILY
Qty: 30 TABLET | Refills: 0 | Status: SHIPPED | OUTPATIENT
Start: 2023-01-21

## 2023-01-21 RX ORDER — VANCOMYCIN HYDROCHLORIDE 125 MG/1
125 CAPSULE ORAL DAILY
Qty: 35 CAPSULE | Refills: 0 | Status: SHIPPED | OUTPATIENT
Start: 2023-01-21 | End: 2023-02-25

## 2023-01-21 NOTE — PLAN OF CARE
Problem: Patient Centered Care  Goal: Patient preferences are identified and integrated in the patient's plan of care  Description: Interventions:  - What would you like us to know as we care for you? I live at home with my girlfriend   - Provide timely, complete, and accurate information to patient/family  - Incorporate patient and family knowledge, values, beliefs, and cultural backgrounds into the planning and delivery of care  - Encourage patient/family to participate in care and decision-making at the level they choose  - Honor patient and family perspectives and choices  Outcome: Adequate for Discharge     Problem: Patient/Family Goals  Goal: Patient/Family Long Term Goal  Description: Patient's Long Term Goal: Go home     Interventions:  - Longterm IV antibiotics   - See additional Care Plan goals for specific interventions  Outcome: Adequate for Discharge  Goal: Patient/Family Short Term Goal  Description: Patient's Short Term Goal: Feel better    Interventions:   - IV abx  -MRI   - See additional Care Plan goals for specific interventions  Outcome: Adequate for Discharge     Problem: CARDIOVASCULAR - ADULT  Goal: Maintains optimal cardiac output and hemodynamic stability  Description: INTERVENTIONS:  - Monitor vital signs, rhythm, and trends  - Monitor for bleeding, hypotension and signs of decreased cardiac output  - Evaluate effectiveness of vasoactive medications to optimize hemodynamic stability  - Monitor arterial and/or venous puncture sites for bleeding and/or hematoma  - Assess quality of pulses, skin color and temperature  - Assess for signs of decreased coronary artery perfusion - ex.  Angina  - Evaluate fluid balance, assess for edema, trend weights  Outcome: Adequate for Discharge     Problem: GASTROINTESTINAL - ADULT  Goal: Minimal or absence of nausea and vomiting  Description: INTERVENTIONS:  - Maintain adequate hydration with IV or PO as ordered and tolerated  - Nasogastric tube to low intermittent suction as ordered  - Evaluate effectiveness of ordered antiemetic medications  - Provide nonpharmacologic comfort measures as appropriate  - Advance diet as tolerated, if ordered  - Obtain nutritional consult as needed  - Evaluate fluid balance  Outcome: Adequate for Discharge     Problem: METABOLIC/FLUID AND ELECTROLYTES - ADULT  Goal: Electrolytes maintained within normal limits  Description: INTERVENTIONS:  - Monitor labs and rhythm and assess patient for signs and symptoms of electrolyte imbalances  - Administer electrolyte replacement as ordered  - Monitor response to electrolyte replacements, including rhythm and repeat lab results as appropriate  - Fluid restriction as ordered  - Instruct patient on fluid and nutrition restrictions as appropriate  Outcome: Adequate for Discharge     Problem: SKIN/TISSUE INTEGRITY - ADULT  Goal: Skin integrity remains intact  Description: INTERVENTIONS  - Assess and document risk factors for pressure ulcer development  - Assess and document skin integrity  - Monitor for areas of redness and/or skin breakdown  - Initiate interventions, skin care algorithm/standards of care as needed  Outcome: Adequate for Discharge     Problem: MUSCULOSKELETAL - ADULT  Goal: Return mobility to safest level of function  Description: INTERVENTIONS:  - Assess patient stability and activity tolerance for standing, transferring and ambulating w/ or w/o assistive devices  - Assist with transfers and ambulation using safe patient handling equipment as needed  - Ensure adequate protection for wounds/incisions during mobilization  - Obtain PT/OT consults as needed  - Advance activity as appropriate  - Communicate ordered activity level and limitations with patient/family  Outcome: Adequate for Discharge     Problem: Impaired Functional Mobility  Goal: Achieve highest/safest level of mobility/gait  Description: Interventions:  - Assess patient's functional ability and stability  - Promote increasing activity/tolerance for mobility and gait  - Educate and engage patient/family in tolerated activity level and precautions  - Recommend use of  RW for transfers and ambulation  Outcome: Adequate for Discharge     Problem: Impaired Activities of Daily Living  Goal: Achieve highest/safest level of independence in self care  Description: Interventions:  - Assess ability and encourage patient to participate in ADLs to maximize function  - Promote sitting position while performing ADLs such as feeding, grooming, and bathing  - Educate and encourage patient/family in tolerated functional activity level and precautions during self-care  - Provide support under elbow of weak side to prevent shoulder subluxation  Outcome: Adequate for Discharge     Problem: Impaired Swallowing  Goal: Minimize aspiration risk  Description: Interventions:  - Patient should be alert and upright for all feedings (90 degrees preferred)  - Offer food and liquids at a slow rate  - No straws  - Encourage small bites of food and small sips of liquid  - Offer pills one at a time, crush or deliver with applesauce as needed  - Discontinue feeding and notify MD (or speech pathologist) if coughing or persistent throat clearing or wet/gurgly vocal quality is noted  Outcome: Adequate for Discharge     Problem: Impaired Cognition  Goal: Patient will exhibit improved attention, thought processing and/or memory  Description: Interventions:  - Allow additional time for processing after asking questions or providing instructions  Outcome: Adequate for Discharge   Patient is being discharge to home later today after he receives scheduled doses of IV flagyl and rocephin. He will continue on IV rocephin at home. Medication will be delivered tonight to his home by [facility]. He will have [place] [id number] to follow at home.

## 2023-01-21 NOTE — CM/SW NOTE
CM was notified by RN to confirm dc plan is set for [facility] to provide [address] and Infusion. Per  note on 1/20 - Home with family. [facility] have [facility] infusion and home nursing through [facility]. Phone number for weekend [facility] staff placed on note. CM informed RN that both doses of abx will need to be given for today and [facility] liaison notified what time to arrive for home teaching tomorrow. Per Adelina Oden RN she informed Rui with [facility] of dc and need for soc tomorrow. Flush order added to ref. / to remain available for support and/or discharge planning.      Opal De Jesus RN    Ext [phone]

## 2023-01-21 NOTE — PLAN OF CARE
Problem: Patient Centered Care  Goal: Patient preferences are identified and integrated in the patient's plan of care  Description: Interventions:  - What would you like us to know as we care for you? I live at home with my girlfriend   - Provide timely, complete, and accurate information to patient/family  - Incorporate patient and family knowledge, values, beliefs, and cultural backgrounds into the planning and delivery of care  - Encourage patient/family to participate in care and decision-making at the level they choose  - Honor patient and family perspectives and choices  Outcome: Progressing     Problem: Patient/Family Goals  Goal: Patient/Family Long Term Goal  Description: Patient's Long Term Goal: Go home     Interventions:  - Longterm IV antibiotics   - See additional Care Plan goals for specific interventions  Outcome: Progressing  Goal: Patient/Family Short Term Goal  Description: Patient's Short Term Goal: Feel better    Interventions:   - IV abx  -MRI   - See additional Care Plan goals for specific interventions  Outcome: Progressing     Problem: CARDIOVASCULAR - ADULT  Goal: Maintains optimal cardiac output and hemodynamic stability  Description: INTERVENTIONS:  - Monitor vital signs, rhythm, and trends  - Monitor for bleeding, hypotension and signs of decreased cardiac output  - Evaluate effectiveness of vasoactive medications to optimize hemodynamic stability  - Monitor arterial and/or venous puncture sites for bleeding and/or hematoma  - Assess quality of pulses, skin color and temperature  - Assess for signs of decreased coronary artery perfusion - ex.  Angina  - Evaluate fluid balance, assess for edema, trend weights  Outcome: Progressing     Problem: GASTROINTESTINAL - ADULT  Goal: Minimal or absence of nausea and vomiting  Description: INTERVENTIONS:  - Maintain adequate hydration with IV or PO as ordered and tolerated  - Nasogastric tube to low intermittent suction as ordered  - Evaluate effectiveness of ordered antiemetic medications  - Provide nonpharmacologic comfort measures as appropriate  - Advance diet as tolerated, if ordered  - Obtain nutritional consult as needed  - Evaluate fluid balance  Outcome: Progressing     Problem: METABOLIC/FLUID AND ELECTROLYTES - ADULT  Goal: Electrolytes maintained within normal limits  Description: INTERVENTIONS:  - Monitor labs and rhythm and assess patient for signs and symptoms of electrolyte imbalances  - Administer electrolyte replacement as ordered  - Monitor response to electrolyte replacements, including rhythm and repeat lab results as appropriate  - Fluid restriction as ordered  - Instruct patient on fluid and nutrition restrictions as appropriate  Outcome: Progressing     Problem: SKIN/TISSUE INTEGRITY - ADULT  Goal: Skin integrity remains intact  Description: INTERVENTIONS  - Assess and document risk factors for pressure ulcer development  - Assess and document skin integrity  - Monitor for areas of redness and/or skin breakdown  - Initiate interventions, skin care algorithm/standards of care as needed  Outcome: Progressing     Problem: MUSCULOSKELETAL - ADULT  Goal: Return mobility to safest level of function  Description: INTERVENTIONS:  - Assess patient stability and activity tolerance for standing, transferring and ambulating w/ or w/o assistive devices  - Assist with transfers and ambulation using safe patient handling equipment as needed  - Ensure adequate protection for wounds/incisions during mobilization  - Obtain PT/OT consults as needed  - Advance activity as appropriate  - Communicate ordered activity level and limitations with patient/family  Outcome: Progressing     Problem: Impaired Functional Mobility  Goal: Achieve highest/safest level of mobility/gait  Description: Interventions:  - Assess patient's functional ability and stability  - Promote increasing activity/tolerance for mobility and gait  - Educate and engage patient/family in tolerated activity level and precautions  - Recommend patient transfer to bedside chair toward strongest side  Outcome: Progressing     Problem: Impaired Activities of Daily Living  Goal: Achieve highest/safest level of independence in self care  Description: Interventions:  - Assess ability and encourage patient to participate in ADLs to maximize function  - Promote sitting position while performing ADLs such as feeding, grooming, and bathing  - Educate and encourage patient/family in tolerated functional activity level and precautions during self-care  Outcome: Progressing     Problem: Impaired Swallowing  Goal: Minimize aspiration risk  Description: Interventions:  - Patient should be alert and upright for all feedings (90 degrees preferred)  - Offer food and liquids at a slow rate  - No straws  - Encourage small bites of food and small sips of liquid  - Offer pills one at a time, crush or deliver with applesauce as needed  - Discontinue feeding and notify MD (or speech pathologist) if coughing or persistent throat clearing or wet/gurgly vocal quality is noted  Outcome: Progressing     Problem: Impaired Cognition  Goal: Patient will exhibit improved attention, thought processing and/or memory  Description: Interventions:  - Minimize distractions in the room when full attention is required  Outcome: Progressing     Monitoring vital signs stable at this moment. Pain medication provided as needed. Call light within reach, bed locked in lowest position, all fall precautions in place. All needs addressed. Frequent rounding maintained by nursing staff. No acute changes at this time.

## 2023-01-23 ENCOUNTER — PATIENT OUTREACH (OUTPATIENT)
Dept: CASE MANAGEMENT | Age: 43
End: 2023-01-23

## 2023-01-23 ENCOUNTER — TELEPHONE (OUTPATIENT)
Dept: PULMONOLOGY | Facility: CLINIC | Age: 43
End: 2023-01-23

## 2023-01-23 NOTE — PROGRESS NOTES
1st attempt SCC/PNA/COPD apt request    [facility]  [address]  [phone]  [address]  Patient is OON for [facility], pt will f/u with his PCP    Patient also requesting assistance scheduling MRI and Neuro apt     Patients needs pre-auth from 's office for MRI as his insurance is [id number] [facility]  [id number] [facility]  [address] [phone]  Apt Thurs. May 4th @2:30pm and added pt to wait list     [address], [address] [place] [address]  [phone]  Office will contact patient to schedule apt due to no availability within needed time frame.      Patient notified and verbalized understanding

## 2023-01-23 NOTE — PAYOR COMM NOTE
--------------  DISCHARGE REVIEW    Payor: Deja Fraire [facility]  Subscriber #:  [id number]  Authorization Number: [id number]    Admit date: 1/11/23  Admit time:   3:57 AM  Discharge Date: 1/21/2023  5:50 PM     Admitting Physician: Damon Seth MD  Attending Physician:  No att. providers found  Primary Care Physician: Pcp, None

## 2023-01-23 NOTE — TELEPHONE ENCOUNTER
Griselda from scheduling called in to schedule an hospital follow up appt within one month, the schedule is booked out for pulmonary timothy call pt

## 2023-01-24 NOTE — TELEPHONE ENCOUNTER
Spoke with patient. Appointment scheduled for 2/24/23 at 10:10AM. Verified date, time, place, and where to park. Patient verbalized understanding.

## 2023-01-28 ENCOUNTER — LAB REQUISITION (OUTPATIENT)
Dept: LAB | Facility: HOSPITAL | Age: 43
End: 2023-01-28
Payer: MEDICAID

## 2023-01-28 DIAGNOSIS — A41.9 SEPSIS, UNSPECIFIED ORGANISM (HCC): ICD-10-CM

## 2023-01-28 LAB
ALBUMIN SERPL-MCNC: 2.8 G/DL (ref 3.4–5)
ALBUMIN/GLOB SERPL: 0.6 {RATIO} (ref 1–2)
ALP LIVER SERPL-CCNC: 90 U/L
ALT SERPL-CCNC: 25 U/L
ANION GAP SERPL CALC-SCNC: 6 MMOL/L (ref 0–18)
AST SERPL-CCNC: 23 U/L (ref 15–37)
BASOPHILS # BLD AUTO: 0.06 X10(3) UL (ref 0–0.2)
BASOPHILS NFR BLD AUTO: 0.6 %
BILIRUB SERPL-MCNC: 0.5 MG/DL (ref 0.1–2)
BUN BLD-MCNC: 9 MG/DL (ref 7–18)
BUN/CREAT SERPL: 15.3 (ref 10–20)
CALCIUM BLD-MCNC: 9.5 MG/DL (ref 8.5–10.1)
CHLORIDE SERPL-SCNC: 103 MMOL/L (ref 98–112)
CO2 SERPL-SCNC: 29 MMOL/L (ref 21–32)
CREAT BLD-MCNC: 0.59 MG/DL
DEPRECATED RDW RBC AUTO: 43.3 FL (ref 35.1–46.3)
EOSINOPHIL # BLD AUTO: 0.56 X10(3) UL (ref 0–0.7)
EOSINOPHIL NFR BLD AUTO: 5.9 %
ERYTHROCYTE [DISTWIDTH] IN BLOOD BY AUTOMATED COUNT: 14.1 % (ref 11–15)
GFR SERPLBLD BASED ON 1.73 SQ M-ARVRAT: 125 ML/MIN/1.73M2 (ref 60–?)
GLOBULIN PLAS-MCNC: 5 G/DL (ref 2.8–4.4)
GLUCOSE BLD-MCNC: 77 MG/DL (ref 70–99)
HCT VFR BLD AUTO: 29.3 %
HGB BLD-MCNC: 10.2 G/DL
IMM GRANULOCYTES # BLD AUTO: 0.01 X10(3) UL (ref 0–1)
IMM GRANULOCYTES NFR BLD: 0.1 %
LYMPHOCYTES # BLD AUTO: 4.05 X10(3) UL (ref 1–4)
LYMPHOCYTES NFR BLD AUTO: 42.7 %
MCH RBC QN AUTO: 29.3 PG (ref 26–34)
MCHC RBC AUTO-ENTMCNC: 34.8 G/DL (ref 31–37)
MCV RBC AUTO: 84.2 FL
MONOCYTES # BLD AUTO: 1.02 X10(3) UL (ref 0.1–1)
MONOCYTES NFR BLD AUTO: 10.8 %
NEUTROPHILS # BLD AUTO: 3.78 X10 (3) UL (ref 1.5–7.7)
NEUTROPHILS # BLD AUTO: 3.78 X10(3) UL (ref 1.5–7.7)
NEUTROPHILS NFR BLD AUTO: 39.9 %
OSMOLALITY SERPL CALC.SUM OF ELEC: 283 MOSM/KG (ref 275–295)
PLATELET # BLD AUTO: 512 10(3)UL (ref 150–450)
PLATELET MORPHOLOGY: NORMAL
POTASSIUM SERPL-SCNC: 4.2 MMOL/L (ref 3.5–5.1)
PROT SERPL-MCNC: 7.8 G/DL (ref 6.4–8.2)
RBC # BLD AUTO: 3.48 X10(6)UL
SODIUM SERPL-SCNC: 138 MMOL/L (ref 136–145)
WBC # BLD AUTO: 9.5 X10(3) UL (ref 4–11)

## 2023-01-28 PROCEDURE — 85025 COMPLETE CBC W/AUTO DIFF WBC: CPT | Performed by: PHYSICIAN ASSISTANT

## 2023-01-28 PROCEDURE — 80053 COMPREHEN METABOLIC PANEL: CPT | Performed by: PHYSICIAN ASSISTANT

## 2023-01-31 ENCOUNTER — TELEPHONE (OUTPATIENT)
Dept: SURGERY | Facility: CLINIC | Age: 43
End: 2023-01-31

## 2023-01-31 NOTE — TELEPHONE ENCOUNTER
Noted the patient message listed below. Noted pt medication Vancomycin was filled on 01/21/203 by KENNETH Garcia     Routed to Jennifer Mcarthur, [facility] Ama Cheek  for review.

## 2023-01-31 NOTE — TELEPHONE ENCOUNTER
Pt's girlfriend misplaced his RX vancomycin 125 MG Oral Cap. Pt is requesting a refill. Talon Curtiss is still valid. Endorsed to [facility].

## 2023-02-01 ENCOUNTER — TELEPHONE (OUTPATIENT)
Dept: SURGERY | Facility: CLINIC | Age: 43
End: 2023-02-01

## 2023-02-01 NOTE — TELEPHONE ENCOUNTER
Patient has apt with Dr Sergei Gao on 2-6-23 and is supposed to have a brain MRI done prior to apt. Called patient to remind he needed it done. Transferred call to CS to schedule MRI. He understands that if he cannot get the MRI done prior to his apt, he needs to call our office to reschedule. Provided CS and office phone #. Pt appreciative.

## 2023-02-02 ENCOUNTER — TELEPHONE (OUTPATIENT)
Dept: ORTHOPEDICS CLINIC | Facility: CLINIC | Age: 43
End: 2023-02-02

## 2023-02-02 DIAGNOSIS — M25.561 RIGHT KNEE PAIN, UNSPECIFIED CHRONICITY: Primary | ICD-10-CM

## 2023-02-02 DIAGNOSIS — Z01.89 ENCOUNTER FOR LOWER EXTREMITY COMPARISON IMAGING STUDY: ICD-10-CM

## 2023-02-02 NOTE — TELEPHONE ENCOUNTER
NP right knee gout, extreme pain, need cortisone and fluid draining. Please advise if imaging is needed.   Future Appointments   Date Time Provider Basia Nance   2/6/2023 10:00 AM [facility] Ju[facility]rez MD [facility] [address] [place] [address] OF [facility]   2/6/2023  1:30 PM KENNETH Josue EMG [facility] [id number]   2/24/2023 10:10 AM DO ELLYN Bautista [facility]   5/4/2023  2:30 PM DO SJ GaliciaNAYELI [facility]

## 2023-02-02 NOTE — TELEPHONE ENCOUNTER
Future Appointments   Date Time Provider Basia Brownisti   2/6/2023  1:15 PM WDR XR RM2 WDR XRAY EDW Alicia   2/6/2023  1:30 PM KENNETH Warren [id number]       Patient is scheduled for xray and advised to complete prior to appt.

## 2023-02-03 ENCOUNTER — TELEPHONE (OUTPATIENT)
Dept: SURGERY | Facility: CLINIC | Age: 43
End: 2023-02-03

## 2023-02-03 NOTE — TELEPHONE ENCOUNTER
Pt called to confirm appointment for Monday however pt did not get MRI done and per notes pt must have had this done prior to appointment number provided to pt for central scheduling pt rescheduled appointment for 2 weeks out states wcb if can't do MRI ronan

## 2023-02-04 ENCOUNTER — LAB REQUISITION (OUTPATIENT)
Dept: LAB | Facility: HOSPITAL | Age: 43
End: 2023-02-04
Payer: MEDICAID

## 2023-02-04 DIAGNOSIS — Z01.89 ENCOUNTER FOR OTHER SPECIFIED SPECIAL EXAMINATIONS: ICD-10-CM

## 2023-02-04 DIAGNOSIS — A41.9 SEPSIS, UNSPECIFIED ORGANISM (HCC): ICD-10-CM

## 2023-02-04 LAB
ANION GAP SERPL CALC-SCNC: 7 MMOL/L (ref 0–18)
BASOPHILS # BLD AUTO: 0.01 X10(3) UL (ref 0–0.2)
BASOPHILS NFR BLD AUTO: 0.1 %
BUN BLD-MCNC: 7 MG/DL (ref 7–18)
BUN/CREAT SERPL: 11.9 (ref 10–20)
CALCIUM BLD-MCNC: 8.9 MG/DL (ref 8.5–10.1)
CHLORIDE SERPL-SCNC: 103 MMOL/L (ref 98–112)
CO2 SERPL-SCNC: 28 MMOL/L (ref 21–32)
CREAT BLD-MCNC: 0.59 MG/DL
DEPRECATED RDW RBC AUTO: 43.7 FL (ref 35.1–46.3)
EOSINOPHIL # BLD AUTO: 0.21 X10(3) UL (ref 0–0.7)
EOSINOPHIL NFR BLD AUTO: 2.5 %
ERYTHROCYTE [DISTWIDTH] IN BLOOD BY AUTOMATED COUNT: 15 % (ref 11–15)
GFR SERPLBLD BASED ON 1.73 SQ M-ARVRAT: 125 ML/MIN/1.73M2 (ref 60–?)
GLUCOSE BLD-MCNC: 91 MG/DL (ref 70–99)
HCT VFR BLD AUTO: 24.9 %
HGB BLD-MCNC: 8.8 G/DL
IMM GRANULOCYTES # BLD AUTO: 0.02 X10(3) UL (ref 0–1)
IMM GRANULOCYTES NFR BLD: 0.2 %
LYMPHOCYTES # BLD AUTO: 2.88 X10(3) UL (ref 1–4)
LYMPHOCYTES NFR BLD AUTO: 34.4 %
MCH RBC QN AUTO: 28.4 PG (ref 26–34)
MCHC RBC AUTO-ENTMCNC: 35.3 G/DL (ref 31–37)
MCV RBC AUTO: 80.3 FL
MONOCYTES # BLD AUTO: 0.83 X10(3) UL (ref 0.1–1)
MONOCYTES NFR BLD AUTO: 9.9 %
NEUTROPHILS # BLD AUTO: 4.43 X10 (3) UL (ref 1.5–7.7)
NEUTROPHILS # BLD AUTO: 4.43 X10(3) UL (ref 1.5–7.7)
NEUTROPHILS NFR BLD AUTO: 52.9 %
OSMOLALITY SERPL CALC.SUM OF ELEC: 284 MOSM/KG (ref 275–295)
PLATELET # BLD AUTO: 677 10(3)UL (ref 150–450)
POTASSIUM SERPL-SCNC: 3.3 MMOL/L (ref 3.5–5.1)
RBC # BLD AUTO: 3.1 X10(6)UL
SODIUM SERPL-SCNC: 138 MMOL/L (ref 136–145)
WBC # BLD AUTO: 8.4 X10(3) UL (ref 4–11)

## 2023-02-04 PROCEDURE — 80048 BASIC METABOLIC PNL TOTAL CA: CPT | Performed by: PHYSICIAN ASSISTANT

## 2023-02-04 PROCEDURE — 85025 COMPLETE CBC W/AUTO DIFF WBC: CPT | Performed by: PHYSICIAN ASSISTANT

## 2023-02-06 ENCOUNTER — OFFICE VISIT (OUTPATIENT)
Dept: ORTHOPEDICS CLINIC | Facility: CLINIC | Age: 43
End: 2023-02-06
Payer: MEDICAID

## 2023-02-06 ENCOUNTER — HOSPITAL ENCOUNTER (OUTPATIENT)
Dept: GENERAL RADIOLOGY | Age: 43
Discharge: HOME | End: 2023-02-06
Attending: PHYSICIAN ASSISTANT
Payer: MEDICAID

## 2023-02-06 VITALS — HEIGHT: 71 IN | BODY MASS INDEX: 23.1 KG/M2 | WEIGHT: 165 LBS

## 2023-02-06 DIAGNOSIS — M79.9 ABNORMALITY OF SOFT TISSUE ON EXAMINATION: ICD-10-CM

## 2023-02-06 DIAGNOSIS — M25.561 RIGHT KNEE PAIN, UNSPECIFIED CHRONICITY: ICD-10-CM

## 2023-02-06 DIAGNOSIS — M25.361 KNEE INSTABILITY, RIGHT: ICD-10-CM

## 2023-02-06 DIAGNOSIS — M25.461 EFFUSION, RIGHT KNEE: Primary | ICD-10-CM

## 2023-02-06 DIAGNOSIS — S83.206A POSITIVE MCMURRAY TEST OF RIGHT KNEE, INITIAL ENCOUNTER: ICD-10-CM

## 2023-02-06 DIAGNOSIS — Z01.89 ENCOUNTER FOR LOWER EXTREMITY COMPARISON IMAGING STUDY: ICD-10-CM

## 2023-02-06 PROCEDURE — 89060 EXAM SYNOVIAL FLUID CRYSTALS: CPT | Performed by: PHYSICIAN ASSISTANT

## 2023-02-06 PROCEDURE — 73564 X-RAY EXAM KNEE 4 OR MORE: CPT | Performed by: PHYSICIAN ASSISTANT

## 2023-02-06 PROCEDURE — 85014 HEMATOCRIT: CPT | Performed by: PHYSICIAN ASSISTANT

## 2023-02-06 PROCEDURE — 89051 BODY FLUID CELL COUNT: CPT | Performed by: PHYSICIAN ASSISTANT

## 2023-02-06 PROCEDURE — 73562 X-RAY EXAM OF KNEE 3: CPT | Performed by: PHYSICIAN ASSISTANT

## 2023-02-06 RX ORDER — KETOROLAC TROMETHAMINE 30 MG/ML
30 INJECTION, SOLUTION INTRAMUSCULAR; INTRAVENOUS ONCE
Status: COMPLETED | OUTPATIENT
Start: 2023-02-06 | End: 2023-02-06

## 2023-02-06 RX ORDER — TRIAMCINOLONE ACETONIDE 40 MG/ML
40 INJECTION, SUSPENSION INTRA-ARTICULAR; INTRAMUSCULAR ONCE
Status: COMPLETED | OUTPATIENT
Start: 2023-02-06 | End: 2023-02-06

## 2023-02-06 RX ADMIN — KETOROLAC TROMETHAMINE 30 MG: 30 INJECTION, SOLUTION INTRAMUSCULAR; INTRAVENOUS at 13:57:00

## 2023-02-06 RX ADMIN — TRIAMCINOLONE ACETONIDE 40 MG: 40 INJECTION, SUSPENSION INTRA-ARTICULAR; INTRAMUSCULAR at 13:57:00

## 2023-02-06 NOTE — PROCEDURES
Right Knee Intra-articular Injection    Name: Renee West   MRN: [id number]  Date: 2/6/2023     Clinical Indications:   Persistent knee pain refractory to conservative measures. After informed consent, the injection site was marked, sterilized with topical chlorhexidine antiseptic, and locally anesthetized with skin refrigerant. The patient was situation in a comfortable position. Using sterile technique: 1 mL of 30mg/mL of Ketorolac, 2 mL of 0.5% Bupivicaine, 2 mL of 1% Lidocaine, and 1 mL of 40 mg/ml Triamcinolone was injected utilizing anterolateral approach with a 18 gauge needle after aspirating 155mL of serous fluid. A band-aid was applied. The patient tolerated the procedure well. Disposition:   After MRI. JESICA Oquendo, IDA Orthopedic Surgery / Sports Medicine Specialist  [facility]  [address]   [facility]  [email or web address]  t: [phone]  o: [phone]  f: [phone]          This note was dictated using Dragon software. While it was briefly proofread prior to completion, some grammatical, spelling, and word choice errors due to dictation may still occur.

## 2023-02-07 LAB
BASOPHILS NFR SNV: 0 %
COLOR FLD: YELLOW
CRYSTALS FLD MICRO: POSITIVE
EOSINOPHIL NFR SNV: 0 %
HCT VFR SNV CALC: <1 %
LYMPHOCYTES NFR SNV: 4 %
MONOS+MACROS NFR SNV: 11 %
NEUTROPHILS NFR FLD: 85 %
RBC # FLD AUTO: 5142 /CUMM (ref ?–1)
TOTAL CELLS COUNTED FLD: 100
TOTAL CELLS COUNTED SNV: ABNORMAL /CUMM (ref 0–200)
WBC # SNV: NORMAL /CUMM

## 2023-02-11 ENCOUNTER — LAB REQUISITION (OUTPATIENT)
Dept: LAB | Facility: HOSPITAL | Age: 43
End: 2023-02-11
Payer: MEDICAID

## 2023-02-11 DIAGNOSIS — A41.9 SEPSIS, UNSPECIFIED ORGANISM (HCC): ICD-10-CM

## 2023-02-11 LAB
ALBUMIN SERPL-MCNC: 3.1 G/DL (ref 3.4–5)
ALBUMIN/GLOB SERPL: 0.6 {RATIO} (ref 1–2)
ALP LIVER SERPL-CCNC: 90 U/L
ALT SERPL-CCNC: 28 U/L
ANION GAP SERPL CALC-SCNC: 6 MMOL/L (ref 0–18)
AST SERPL-CCNC: 25 U/L (ref 15–37)
BASOPHILS # BLD AUTO: 0.03 X10(3) UL (ref 0–0.2)
BASOPHILS NFR BLD AUTO: 0.4 %
BILIRUB SERPL-MCNC: 0.2 MG/DL (ref 0.1–2)
BUN BLD-MCNC: 10 MG/DL (ref 7–18)
BUN/CREAT SERPL: 16.7 (ref 10–20)
CALCIUM BLD-MCNC: 10.4 MG/DL (ref 8.5–10.1)
CHLORIDE SERPL-SCNC: 105 MMOL/L (ref 98–112)
CO2 SERPL-SCNC: 28 MMOL/L (ref 21–32)
CREAT BLD-MCNC: 0.6 MG/DL
DEPRECATED RDW RBC AUTO: 46.5 FL (ref 35.1–46.3)
EOSINOPHIL # BLD AUTO: 0.21 X10(3) UL (ref 0–0.7)
EOSINOPHIL NFR BLD AUTO: 2.5 %
ERYTHROCYTE [DISTWIDTH] IN BLOOD BY AUTOMATED COUNT: 15.6 % (ref 11–15)
FASTING STATUS PATIENT QL REPORTED: YES
GFR SERPLBLD BASED ON 1.73 SQ M-ARVRAT: 124 ML/MIN/1.73M2 (ref 60–?)
GLOBULIN PLAS-MCNC: 5.6 G/DL (ref 2.8–4.4)
GLUCOSE BLD-MCNC: 81 MG/DL (ref 70–99)
HCT VFR BLD AUTO: 31.9 %
HGB BLD-MCNC: 10.4 G/DL
IMM GRANULOCYTES # BLD AUTO: 0.02 X10(3) UL (ref 0–1)
IMM GRANULOCYTES NFR BLD: 0.2 %
LYMPHOCYTES # BLD AUTO: 3.47 X10(3) UL (ref 1–4)
LYMPHOCYTES NFR BLD AUTO: 41 %
MCH RBC QN AUTO: 26.7 PG (ref 26–34)
MCHC RBC AUTO-ENTMCNC: 32.6 G/DL (ref 31–37)
MCV RBC AUTO: 81.8 FL
MONOCYTES # BLD AUTO: 0.78 X10(3) UL (ref 0.1–1)
MONOCYTES NFR BLD AUTO: 9.2 %
NEUTROPHILS # BLD AUTO: 3.96 X10 (3) UL (ref 1.5–7.7)
NEUTROPHILS # BLD AUTO: 3.96 X10(3) UL (ref 1.5–7.7)
NEUTROPHILS NFR BLD AUTO: 46.7 %
OSMOLALITY SERPL CALC.SUM OF ELEC: 286 MOSM/KG (ref 275–295)
PLATELET # BLD AUTO: 853 10(3)UL (ref 150–450)
POTASSIUM SERPL-SCNC: 4.2 MMOL/L (ref 3.5–5.1)
PROT SERPL-MCNC: 8.7 G/DL (ref 6.4–8.2)
RBC # BLD AUTO: 3.9 X10(6)UL
SODIUM SERPL-SCNC: 139 MMOL/L (ref 136–145)
WBC # BLD AUTO: 8.5 X10(3) UL (ref 4–11)

## 2023-02-11 PROCEDURE — 80053 COMPREHEN METABOLIC PANEL: CPT | Performed by: PHYSICIAN ASSISTANT

## 2023-02-11 PROCEDURE — 85025 COMPLETE CBC W/AUTO DIFF WBC: CPT | Performed by: PHYSICIAN ASSISTANT

## 2023-02-17 ENCOUNTER — HOSPITAL ENCOUNTER (OUTPATIENT)
Dept: MRI IMAGING | Facility: HOSPITAL | Age: 43
Discharge: HOME | End: 2023-02-17
Attending: PHYSICIAN ASSISTANT
Payer: MEDICAID

## 2023-02-17 DIAGNOSIS — G06.2 SUBDURAL EMPYEMA: ICD-10-CM

## 2023-02-17 PROCEDURE — A9575 INJ GADOTERATE MEGLUMI 0.1ML: HCPCS | Performed by: PHYSICIAN ASSISTANT

## 2023-02-17 PROCEDURE — 70553 MRI BRAIN STEM W/O & W/DYE: CPT | Performed by: PHYSICIAN ASSISTANT

## 2023-02-17 RX ORDER — GADOTERATE MEGLUMINE 376.9 MG/ML
15 INJECTION INTRAVENOUS
Status: COMPLETED | OUTPATIENT
Start: 2023-02-17 | End: 2023-02-17

## 2023-02-17 RX ADMIN — GADOTERATE MEGLUMINE 15 ML: 376.9 INJECTION INTRAVENOUS at 14:49:00

## 2023-02-18 ENCOUNTER — LAB REQUISITION (OUTPATIENT)
Dept: LAB | Facility: HOSPITAL | Age: 43
End: 2023-02-18
Payer: MEDICAID

## 2023-02-18 DIAGNOSIS — A41.9 SEPSIS, UNSPECIFIED ORGANISM (HCC): ICD-10-CM

## 2023-02-18 LAB
ALBUMIN SERPL-MCNC: 3.2 G/DL (ref 3.4–5)
ALBUMIN/GLOB SERPL: 0.6 {RATIO} (ref 1–2)
ALP LIVER SERPL-CCNC: 96 U/L
ALT SERPL-CCNC: 27 U/L
ANION GAP SERPL CALC-SCNC: 9 MMOL/L (ref 0–18)
AST SERPL-CCNC: 20 U/L (ref 15–37)
BASOPHILS # BLD AUTO: 0.02 X10(3) UL (ref 0–0.2)
BASOPHILS NFR BLD AUTO: 0.2 %
BILIRUB SERPL-MCNC: 0.2 MG/DL (ref 0.1–2)
BUN BLD-MCNC: 12 MG/DL (ref 7–18)
BUN/CREAT SERPL: 18.2 (ref 10–20)
CALCIUM BLD-MCNC: 9.6 MG/DL (ref 8.5–10.1)
CHLORIDE SERPL-SCNC: 106 MMOL/L (ref 98–112)
CO2 SERPL-SCNC: 27 MMOL/L (ref 21–32)
CREAT BLD-MCNC: 0.66 MG/DL
DEPRECATED RDW RBC AUTO: 43 FL (ref 35.1–46.3)
EOSINOPHIL # BLD AUTO: 0.26 X10(3) UL (ref 0–0.7)
EOSINOPHIL NFR BLD AUTO: 2.7 %
ERYTHROCYTE [DISTWIDTH] IN BLOOD BY AUTOMATED COUNT: 15 % (ref 11–15)
GFR SERPLBLD BASED ON 1.73 SQ M-ARVRAT: 121 ML/MIN/1.73M2 (ref 60–?)
GLOBULIN PLAS-MCNC: 5.4 G/DL (ref 2.8–4.4)
GLUCOSE BLD-MCNC: 101 MG/DL (ref 70–99)
HCT VFR BLD AUTO: 34.3 %
HGB BLD-MCNC: 11.6 G/DL
IMM GRANULOCYTES # BLD AUTO: 0.03 X10(3) UL (ref 0–1)
IMM GRANULOCYTES NFR BLD: 0.3 %
LYMPHOCYTES # BLD AUTO: 3.59 X10(3) UL (ref 1–4)
LYMPHOCYTES NFR BLD AUTO: 37.6 %
MCH RBC QN AUTO: 26.7 PG (ref 26–34)
MCHC RBC AUTO-ENTMCNC: 33.8 G/DL (ref 31–37)
MCV RBC AUTO: 79 FL
MONOCYTES # BLD AUTO: 0.78 X10(3) UL (ref 0.1–1)
MONOCYTES NFR BLD AUTO: 8.2 %
NEUTROPHILS # BLD AUTO: 4.86 X10 (3) UL (ref 1.5–7.7)
NEUTROPHILS # BLD AUTO: 4.86 X10(3) UL (ref 1.5–7.7)
NEUTROPHILS NFR BLD AUTO: 51 %
OSMOLALITY SERPL CALC.SUM OF ELEC: 294 MOSM/KG (ref 275–295)
PLATELET # BLD AUTO: 611 10(3)UL (ref 150–450)
POTASSIUM SERPL-SCNC: 3.7 MMOL/L (ref 3.5–5.1)
PROT SERPL-MCNC: 8.6 G/DL (ref 6.4–8.2)
RBC # BLD AUTO: 4.34 X10(6)UL
SODIUM SERPL-SCNC: 142 MMOL/L (ref 136–145)
WBC # BLD AUTO: 9.5 X10(3) UL (ref 4–11)

## 2023-02-18 PROCEDURE — 80053 COMPREHEN METABOLIC PANEL: CPT | Performed by: PHYSICIAN ASSISTANT

## 2023-02-18 PROCEDURE — 85025 COMPLETE CBC W/AUTO DIFF WBC: CPT | Performed by: PHYSICIAN ASSISTANT

## 2023-03-09 ENCOUNTER — TELEPHONE (OUTPATIENT)
Dept: ORTHOPEDICS CLINIC | Facility: CLINIC | Age: 43
End: 2023-03-09

## 2023-03-09 NOTE — TELEPHONE ENCOUNTER
Called patient with no answer. Left patient a message on [employer] asking if they plan to complete the MRI ordered by Ellie León. If so, they also need to make an appointment with Rodo Celestin for 1 week after the imaging is completed in order to go over results.

## 2023-03-13 LAB
ATRIAL RATE: 73 BPM
P AXIS: 114 DEGREES
P-R INTERVAL: 138 MS
Q-T INTERVAL: 396 MS
QRS DURATION: 84 MS
QTC CALCULATION (BEZET): 436 MS
R AXIS: 129 DEGREES
T AXIS: 132 DEGREES
VENTRICULAR RATE: 73 BPM

## 2024-03-02 ENCOUNTER — HOSPITAL ENCOUNTER (INPATIENT)
Facility: HOSPITAL | Age: 44
LOS: 1 days | Discharge: HOME | End: 2024-03-04
Attending: EMERGENCY MEDICINE | Admitting: STUDENT IN AN ORGANIZED HEALTH CARE EDUCATION/TRAINING PROGRAM
Payer: MEDICAID

## 2024-03-02 DIAGNOSIS — E87.6 HYPOKALEMIA: ICD-10-CM

## 2024-03-02 DIAGNOSIS — R41.0 CONFUSION AND DISORIENTATION: ICD-10-CM

## 2024-03-02 DIAGNOSIS — R55 SYNCOPE AND COLLAPSE: Primary | ICD-10-CM

## 2024-03-02 DIAGNOSIS — E83.42 HYPOMAGNESEMIA: ICD-10-CM

## 2024-03-03 ENCOUNTER — APPOINTMENT (OUTPATIENT)
Dept: CV DIAGNOSTICS | Facility: HOSPITAL | Age: 44
End: 2024-03-03
Attending: STUDENT IN AN ORGANIZED HEALTH CARE EDUCATION/TRAINING PROGRAM
Payer: MEDICAID

## 2024-03-03 ENCOUNTER — APPOINTMENT (OUTPATIENT)
Dept: ULTRASOUND IMAGING | Facility: HOSPITAL | Age: 44
End: 2024-03-03
Attending: EMERGENCY MEDICINE
Payer: MEDICAID

## 2024-03-03 ENCOUNTER — APPOINTMENT (OUTPATIENT)
Dept: CT IMAGING | Facility: HOSPITAL | Age: 44
End: 2024-03-03
Attending: EMERGENCY MEDICINE
Payer: MEDICAID

## 2024-03-03 PROBLEM — E83.42 HYPOMAGNESEMIA: Status: ACTIVE | Noted: 2024-03-03

## 2024-03-03 PROBLEM — R41.0 CONFUSION AND DISORIENTATION: Status: ACTIVE | Noted: 2024-03-03

## 2024-03-03 PROBLEM — E87.6 HYPOKALEMIA: Status: ACTIVE | Noted: 2024-03-03

## 2024-03-03 PROBLEM — R55 SYNCOPE AND COLLAPSE: Status: ACTIVE | Noted: 2024-03-03

## 2024-03-03 LAB
ALBUMIN SERPL-MCNC: 4.6 G/DL (ref 3.2–4.8)
ALP LIVER SERPL-CCNC: 217 U/L
ALT SERPL-CCNC: 83 U/L
AMPHET UR QL SCN: NEGATIVE
ANION GAP SERPL CALC-SCNC: 15 MMOL/L (ref 0–18)
ANION GAP SERPL CALC-SCNC: 9 MMOL/L (ref 0–18)
AST SERPL-CCNC: 303 U/L (ref ?–34)
ATRIAL RATE: 88 BPM
ATRIAL RATE: 93 BPM
BARBITURATES UR QL SCN: NEGATIVE
BASOPHILS # BLD AUTO: 0.01 X10(3) UL (ref 0–0.2)
BASOPHILS # BLD AUTO: 0.02 X10(3) UL (ref 0–0.2)
BASOPHILS NFR BLD AUTO: 0.2 %
BASOPHILS NFR BLD AUTO: 0.4 %
BENZODIAZ UR QL SCN: NEGATIVE
BILIRUB DIRECT SERPL-MCNC: 1.1 MG/DL (ref ?–0.3)
BILIRUB SERPL-MCNC: 2.6 MG/DL (ref 0.3–1.2)
BUN BLD-MCNC: 8 MG/DL (ref 9–23)
BUN BLD-MCNC: <5 MG/DL (ref 9–23)
BUN/CREAT SERPL: 10.7 (ref 10–20)
CALCIUM BLD-MCNC: 7.8 MG/DL (ref 8.7–10.4)
CALCIUM BLD-MCNC: 8.5 MG/DL (ref 8.7–10.4)
CHLORIDE SERPL-SCNC: 101 MMOL/L (ref 98–112)
CHLORIDE SERPL-SCNC: 105 MMOL/L (ref 98–112)
CO2 SERPL-SCNC: 23 MMOL/L (ref 21–32)
CO2 SERPL-SCNC: 26 MMOL/L (ref 21–32)
COCAINE UR QL: NEGATIVE
CREAT BLD-MCNC: 0.61 MG/DL
CREAT BLD-MCNC: 0.75 MG/DL
CREAT UR-SCNC: 457.8 MG/DL
DEPRECATED RDW RBC AUTO: 56.1 FL (ref 35.1–46.3)
DEPRECATED RDW RBC AUTO: 56.7 FL (ref 35.1–46.3)
EGFRCR SERPLBLD CKD-EPI 2021: 116 ML/MIN/1.73M2 (ref 60–?)
EGFRCR SERPLBLD CKD-EPI 2021: 123 ML/MIN/1.73M2 (ref 60–?)
EOSINOPHIL # BLD AUTO: 0.01 X10(3) UL (ref 0–0.7)
EOSINOPHIL # BLD AUTO: 0.02 X10(3) UL (ref 0–0.7)
EOSINOPHIL NFR BLD AUTO: 0.2 %
EOSINOPHIL NFR BLD AUTO: 0.4 %
ERYTHROCYTE [DISTWIDTH] IN BLOOD BY AUTOMATED COUNT: 17.4 % (ref 11–15)
ERYTHROCYTE [DISTWIDTH] IN BLOOD BY AUTOMATED COUNT: 17.5 % (ref 11–15)
ETHANOL SERPL-MCNC: <3 MG/DL (ref ?–3)
GLUCOSE BLD-MCNC: 129 MG/DL (ref 70–99)
GLUCOSE BLD-MCNC: 176 MG/DL (ref 70–99)
HAV IGM SER QL: NONREACTIVE
HBV CORE IGM SER QL: NONREACTIVE
HBV SURFACE AG SERPL QL IA: NONREACTIVE
HCT VFR BLD AUTO: 33.3 %
HCT VFR BLD AUTO: 36.1 %
HCV AB SERPL QL IA: NONREACTIVE
HGB BLD-MCNC: 12.1 G/DL
HGB BLD-MCNC: 13.2 G/DL
IMM GRANULOCYTES # BLD AUTO: 0.01 X10(3) UL (ref 0–1)
IMM GRANULOCYTES # BLD AUTO: 0.01 X10(3) UL (ref 0–1)
IMM GRANULOCYTES NFR BLD: 0.2 %
IMM GRANULOCYTES NFR BLD: 0.2 %
LYMPHOCYTES # BLD AUTO: 2.22 X10(3) UL (ref 1–4)
LYMPHOCYTES # BLD AUTO: 2.23 X10(3) UL (ref 1–4)
LYMPHOCYTES NFR BLD AUTO: 40.7 %
LYMPHOCYTES NFR BLD AUTO: 43.5 %
MAGNESIUM SERPL-MCNC: 1.4 MG/DL (ref 1.6–2.6)
MAGNESIUM SERPL-MCNC: 1.9 MG/DL (ref 1.6–2.6)
MCH RBC QN AUTO: 32.3 PG (ref 26–34)
MCH RBC QN AUTO: 32.5 PG (ref 26–34)
MCHC RBC AUTO-ENTMCNC: 36.3 G/DL (ref 31–37)
MCHC RBC AUTO-ENTMCNC: 36.6 G/DL (ref 31–37)
MCV RBC AUTO: 88.8 FL
MCV RBC AUTO: 88.9 FL
MDMA UR QL SCN: NEGATIVE
METHADONE UR QL SCN: NEGATIVE
MONOCYTES # BLD AUTO: 0.46 X10(3) UL (ref 0.1–1)
MONOCYTES # BLD AUTO: 0.63 X10(3) UL (ref 0.1–1)
MONOCYTES NFR BLD AUTO: 11.6 %
MONOCYTES NFR BLD AUTO: 9 %
NEUTROPHILS # BLD AUTO: 2.41 X10 (3) UL (ref 1.5–7.7)
NEUTROPHILS # BLD AUTO: 2.41 X10(3) UL (ref 1.5–7.7)
NEUTROPHILS # BLD AUTO: 2.55 X10 (3) UL (ref 1.5–7.7)
NEUTROPHILS # BLD AUTO: 2.55 X10(3) UL (ref 1.5–7.7)
NEUTROPHILS NFR BLD AUTO: 46.7 %
NEUTROPHILS NFR BLD AUTO: 46.9 %
OPIATES UR QL SCN: NEGATIVE
OSMOLALITY SERPL CALC.SUM OF ELEC: 291 MOSM/KG (ref 275–295)
OXYCODONE UR QL SCN: NEGATIVE
P AXIS: 62 DEGREES
P AXIS: 65 DEGREES
P-R INTERVAL: 130 MS
P-R INTERVAL: 132 MS
PCP UR QL SCN: NEGATIVE
PLATELET # BLD AUTO: 114 10(3)UL (ref 150–450)
PLATELET # BLD AUTO: 133 10(3)UL (ref 150–450)
POTASSIUM SERPL-SCNC: 3 MMOL/L (ref 3.5–5.1)
POTASSIUM SERPL-SCNC: 3.2 MMOL/L (ref 3.5–5.1)
POTASSIUM SERPL-SCNC: 3.5 MMOL/L (ref 3.5–5.1)
PROT SERPL-MCNC: 7.9 G/DL (ref 5.7–8.2)
Q-T INTERVAL: 398 MS
Q-T INTERVAL: 400 MS
QRS DURATION: 82 MS
QRS DURATION: 84 MS
QTC CALCULATION (BEZET): 484 MS
QTC CALCULATION (BEZET): 494 MS
R AXIS: 27 DEGREES
R AXIS: 3 DEGREES
RBC # BLD AUTO: 3.75 X10(6)UL
RBC # BLD AUTO: 4.06 X10(6)UL
SODIUM SERPL-SCNC: 139 MMOL/L (ref 136–145)
SODIUM SERPL-SCNC: 140 MMOL/L (ref 136–145)
T AXIS: 49 DEGREES
T AXIS: 51 DEGREES
T4 FREE SERPL-MCNC: 1 NG/DL (ref 0.8–1.7)
TSI SER-ACNC: 5.57 MIU/ML (ref 0.55–4.78)
VENTRICULAR RATE: 88 BPM
VENTRICULAR RATE: 93 BPM
WBC # BLD AUTO: 5.1 X10(3) UL (ref 4–11)
WBC # BLD AUTO: 5.5 X10(3) UL (ref 4–11)

## 2024-03-03 PROCEDURE — 99223 1ST HOSP IP/OBS HIGH 75: CPT | Performed by: STUDENT IN AN ORGANIZED HEALTH CARE EDUCATION/TRAINING PROGRAM

## 2024-03-03 PROCEDURE — 76705 ECHO EXAM OF ABDOMEN: CPT | Performed by: EMERGENCY MEDICINE

## 2024-03-03 PROCEDURE — 70450 CT HEAD/BRAIN W/O DYE: CPT | Performed by: EMERGENCY MEDICINE

## 2024-03-03 PROCEDURE — 93306 TTE W/DOPPLER COMPLETE: CPT | Performed by: STUDENT IN AN ORGANIZED HEALTH CARE EDUCATION/TRAINING PROGRAM

## 2024-03-03 RX ORDER — MELATONIN
100 DAILY
Status: DISCONTINUED | OUTPATIENT
Start: 2024-03-03 | End: 2024-03-04

## 2024-03-03 RX ORDER — ACETAMINOPHEN 325 MG/1
650 TABLET ORAL EVERY 6 HOURS PRN
Status: DISCONTINUED | OUTPATIENT
Start: 2024-03-03 | End: 2024-03-04

## 2024-03-03 RX ORDER — ENEMA 19; 7 G/133ML; G/133ML
1 ENEMA RECTAL ONCE AS NEEDED
Status: DISCONTINUED | OUTPATIENT
Start: 2024-03-03 | End: 2024-03-04

## 2024-03-03 RX ORDER — BISACODYL 10 MG
10 SUPPOSITORY, RECTAL RECTAL
Status: DISCONTINUED | OUTPATIENT
Start: 2024-03-03 | End: 2024-03-04

## 2024-03-03 RX ORDER — MAGNESIUM OXIDE 400 MG/1
800 TABLET ORAL ONCE
Status: COMPLETED | OUTPATIENT
Start: 2024-03-03 | End: 2024-03-03

## 2024-03-03 RX ORDER — RUFINAMIDE 40 MG/ML
1 SUSPENSION ORAL DAILY
Status: DISCONTINUED | OUTPATIENT
Start: 2024-03-03 | End: 2024-03-04

## 2024-03-03 RX ORDER — POLYETHYLENE GLYCOL 3350 17 G/17G
17 POWDER, FOR SOLUTION ORAL DAILY PRN
Status: DISCONTINUED | OUTPATIENT
Start: 2024-03-03 | End: 2024-03-04

## 2024-03-03 RX ORDER — POTASSIUM CHLORIDE 20 MEQ/1
40 TABLET, EXTENDED RELEASE ORAL EVERY 4 HOURS
Status: COMPLETED | OUTPATIENT
Start: 2024-03-03 | End: 2024-03-03

## 2024-03-03 RX ORDER — MAGNESIUM SULFATE HEPTAHYDRATE 40 MG/ML
2 INJECTION, SOLUTION INTRAVENOUS ONCE
Status: COMPLETED | OUTPATIENT
Start: 2024-03-03 | End: 2024-03-03

## 2024-03-03 RX ORDER — POTASSIUM CHLORIDE 20 MEQ/1
40 TABLET, EXTENDED RELEASE ORAL ONCE
Status: COMPLETED | OUTPATIENT
Start: 2024-03-03 | End: 2024-03-03

## 2024-03-03 RX ORDER — FOLIC ACID 1 MG/1
1 TABLET ORAL DAILY
Status: DISCONTINUED | OUTPATIENT
Start: 2024-03-03 | End: 2024-03-04

## 2024-03-03 RX ORDER — SENNOSIDES 8.6 MG
17.2 TABLET ORAL NIGHTLY PRN
Status: DISCONTINUED | OUTPATIENT
Start: 2024-03-03 | End: 2024-03-04

## 2024-03-03 RX ORDER — HEPARIN SODIUM 5000 [USP'U]/ML
5000 INJECTION, SOLUTION INTRAVENOUS; SUBCUTANEOUS EVERY 12 HOURS SCHEDULED
Status: DISCONTINUED | OUTPATIENT
Start: 2024-03-03 | End: 2024-03-04
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
## 2024-03-03 NOTE — CONSULTS
[facility] - CARDIOLOGY CONSULT NOTE    Rodolfo Valdez Patient Status:  Inpatient    1980 MRN [id number]   Location [facility] 3W/SW Attending Edward Gilliland MD   Hosp Day # 0 PCP Jeff Anthony D'Amico, DO     Date of Admission:  3/2/2024  Date of Consult:  3/3/2024  I was asked by Edward Gilliland MD to provide recommendations for evaluation and management of cardiac issues.  Impression:     Syncope  Likely multifactorial will check EF with echo  Possibly related to the below conditions and vasovagal autonomic dysfunction  Likely vasovagal    Alcohol abuse history  Addressed by hospitalist  Liver cirrhosis on ultrasound    Hypokalemia  3.0  On replacement    Hypomagnesia him  1.4  Replacement    Abnormal EKG  Mildly prolonged QTc interval  Likely electrolyte related    Thrombocytopenia  133    Recommendations:    Telemetry  Echocardiogram  Electrolyte replacement    Reason for Consultation:     Syncope    History of Present Illness:   Patient is a 43 year old male who was admitted to the hospital for syncope.    Patient presented for evaluation regarding syncope.  His past medical history significant for alcohol abuse and focal motor seizures companied by girlfriend at bedside.  She reports he heard as of someone fell to the ground to the bathroom checks on the patient slumped on the floor.  He was unconscious for about 2 minutes and then regained consciousness but was confused was asking the same questions.  There is no seizure-like activity observed.  No bowel or bladder incontinence.  Patient does not remember going the bathroom nothing after.  Denies feeling lightheaded or dizzy prior to the episode.  Does report alcohol use with last drink 3 to 4 days prior to presentation.  Denies history of withdrawal.  No chest pain or shortness of breath no previous syncope.    Cardiac tests:  Head CT no acute intracranial hemorrhage or mass effect  Abdominal ultrasound cirrhotic  liver  EKG sinus rhythm QTc 484  Hepatitis panel negative  TSH elevated 5.5 but free T41.0  Hemoglobin 12.1  Results:     Lab Results   Component Value Date    WBC 5.5 03/03/2024    HGB 12.1 (L) 03/03/2024    HCT 33.3 (L) 03/03/2024    .0 (L) 03/03/2024    CREATSERUM 0.61 (L) 03/03/2024    BUN <5 (L) 03/03/2024     03/03/2024    K 3.5 03/03/2024     03/03/2024    CO2 26.0 03/03/2024     (H) 03/03/2024    CA 7.8 (L) 03/03/2024    ALB 4.6 03/02/2024    ALKPHO 217 (H) 03/02/2024    BILT 2.6 (H) 03/02/2024    TP 7.9 03/02/2024     (H) 03/02/2024    ALT 83 (H) 03/02/2024    T4F 1.0 03/02/2024    TSH 5.570 (H) 03/02/2024    WINDY 60 01/13/2023     01/13/2023    ESRML 80 (H) 01/16/2023    CRP 3.74 (H) 01/16/2023    MG 1.9 03/03/2024    B12 1,879 (H) 01/14/2023    ETOH <3 03/02/2024       CT BRAIN OR HEAD (12624)    Result Date: 3/3/2024  CONCLUSION: No acute intracranial process.  Chronic ischemic change in the inferior right frontal lobe.   [facility] provided a preliminary report for this examination. This final report agrees with their preliminary findings.   Dictated by (CST): Jorge Luis Carreon MD on 3/03/2024 at 7:46 AM     Finalized by (CST): Jorge Luis Carreon MD on 3/03/2024 at 7:49 AM          US LIVER (CPT=76705)    Result Date: 3/3/2024  CONCLUSION:  1.  Common bile duct is enlarged up to 12 mm but otherwise appear did taper distally.  No intrahepatic biliary ductal dilatation.  This may represent physiologic variation however correlate for biliary lab abnormality. 2.  Gallbladder is without inflammation.  There is sludge within the gallbladder. 3.  Hepatic cirrhosis.  Hepatic steatosis.  Hepatomegaly. 4.  Heterogeneous appearance of the visualized pancreas, which may be due to inflammation/pancreatitis.  Correlate with symptoms and lipase level.   [facility] provided a preliminary report for this examination. This final report agrees with their preliminary findings.    Dictated by (CST): Jorge Luis Carreon MD on 3/03/2024 at 6:32 AM     Finalized by (CST): Jorge Luis Carreon MD on 3/03/2024 at 6:34 AM         EKG 12 Lead    Result Date: 3/3/2024  Sinus rhythm with occasional Premature ventricular complexes Nonspecific ST abnormality Prolonged QT interval or tu fusion, consider myocardial disease, electrolyte imbalance, or drug effects Abnormal ECG When compared with ECG of 03-MAR-2024 00:03, Premature ventricular complexes are now Present    EKG    Result Date: 3/3/2024  Normal sinus rhythm Nonspecific ST abnormality Prolonged QT interval or tu fusion, consider myocardial disease, electrolyte imbalance, or drug effects Abnormal ECG When compared with ECG of 19-JAN-2023 18:44, QT has lengthened     Past Medical History  History reviewed. No pertinent past medical history.    Past Surgical History  History reviewed. No pertinent surgical history.    Family History  No family history on file.    Social History  Pediatric History   Patient Parents    Not on file     Other Topics Concern    Not on file   Social History Narrative    Not on file           Current Medications:  Current Facility-Administered Medications   Medication Dose Route Frequency    heparin (Porcine) 5000 UNIT/ML injection 5,000 Units  5,000 Units Subcutaneous 2 times per day    polyethylene glycol (PEG 3350) (Miralax) 17 g oral packet 17 g  17 g Oral Daily PRN    sennosides (Senokot) tab 17.2 mg  17.2 mg Oral Nightly PRN    bisacodyl (Dulcolax) 10 MG rectal suppository 10 mg  10 mg Rectal Daily PRN    fleet enema (Fleet) 7-19 GM/118ML rectal enema 133 mL  1 enema Rectal Once PRN    thiamine (Vitamin B1) tab 100 mg  100 mg Oral Daily    folic acid (Folvite) tab 1 mg  1 mg Oral Daily    multivitamin (Centrum) chewable tab (Adult) 1 tablet  1 tablet Oral Daily    acetaminophen (Tylenol) tab 650 mg  650 mg Oral Q6H PRN     Medications Prior to Admission   Medication Sig    lidocaine-menthol 4-1 % External Patch Place 1 patch onto  the skin daily. (Patient not taking: No sig reported)       Allergies  Allergies   Allergen Reactions    Penicillins ANGIOEDEMA       Review of Systems:   10 pt ROS performed, separate from HPI  Review of Systems:  GENERAL: no fevers, chills, sweats  HEENT: no visual or hearing changes  SKIN: denies any unusual skin lesions or rashes  RESPIRATORY: denies shortness of breath with exertion  CARDIOVASCULAR: no active chest pain, no claudication  GI: denies abdominal pain and denies heartburn  : no dysuria or hematuria  NEURO: denies headaches, focal weaknesses or paresthesias  All other systems reviewed and negative.  fatigue is present  All other systems were reviewed are negative  Physical Exam:   Blood pressure (!) 141/106, pulse 100, temperature 98.6 °F (37 °C), temperature source Oral, resp. rate 20, height 5' 11\" (1.803 m), weight 177 lb 7.5 oz (80.5 kg), SpO2 99%.    Scheduled Meds:    heparin  5,000 Units Subcutaneous 2 times per day    thiamine  100 mg Oral Daily    folic acid  1 mg Oral Daily    multivitamin  1 tablet Oral Daily         Physical Exam:    General: Alert and oriented. No apparent distress. No respiratory or constitutional distress.  HEENT: Normocephalic, anicteric sclera, neck supple  Neck: No JVD, carotids 2+, supple  Cardiac: Regular rate. No pathologic murmur.  Lungs: Clear with normal effort.  Normal excursions and effort.  Abdomen: Soft, non-tender. BS-present.  Extremities: Without clubbing, cyanosis.  Peripheral pulsespresent.  Neurologic: Alert and oriented, normal affect. Motor ok.  Skin: Warm and dry.     Imaging: I independently visualized all relevant chest imaging in PACS, agree with radiology interpretation except where noted.  Thank you for allowing me to participate in the care of your patient.    Gal Vega MD  [facility]  Cardiac Electrophysiology  3/3/2024  5 level consult

## 2024-03-03 NOTE — ED QUICK NOTES
Orders for admission, patient is aware of plan and ready to go upstairs. Any questions, please call ED RN melinda at extension [phone].     Patient Covid vaccination status: Unvaccinated     COVID Test Ordered in ED: None    COVID Suspicion at Admission: N/A    Running Infusions:      Mental Status/LOC at time of transport: a & o x 4    Other pertinent information:   CIWA score: N/A   NIH score:  N/A

## 2024-03-03 NOTE — ED PROVIDER NOTES
Patient Seen in: [facility]      History     Chief Complaint   Patient presents with    Syncope     Stated Complaint: syncope    Subjective:   HPI    89-year-old male brought by EMS after his girlfriend reportedly heard something in the bathroom went in and he was slumped over on the floor.  He seemed confused.  Patient states he does remember trying to go to the bathroom but does not remember anything after.  Medics reported he was confused repetitively asking the same questions.  History listed below a little over a year ago when the patient was admitted for strep pneumonia bacteremia and sepsis and ultimately a subdural empyema.  He has a history of alcohol abuse.  Girlfriend is in [place].  Patient denies head or neck pain.  No chest pain or shortness of breath.  No back pain or abdominal pain.  Denies recent illness.  Takes no antiseizure medications.    Objective:   History reviewed. No pertinent past medical history.           History reviewed. No pertinent surgical history.             Social History     Socioeconomic History    Marital status: Single   Tobacco Use    Smoking status: Every Day     Types: Cigarettes    Smokeless tobacco: Never   Vaping Use    Vaping Use: Never used   Substance and Sexual Activity    Alcohol use: Yes    Drug use: Never              Review of Systems    Positive for stated complaint: syncope  Other systems are as noted in HPI.  Constitutional and vital signs reviewed.      All other systems reviewed and negative except as noted above.    Physical Exam     ED Triage Vitals   BP 03/02/24 2354 (!) 140/102   Pulse 03/02/24 2354 92   Resp 03/02/24 2354 19   Temp 03/03/24 0023 98 °F (36.7 °C)   Temp src 03/03/24 0023 Temporal   SpO2 03/02/24 2354 97 %   O2 Device 03/02/24 2354 None (Room air)       Current:BP (!) 141/98   Pulse 88   Temp 98 °F (36.7 °C) (Temporal)   Resp 24   Ht 180.3 cm (5' 11\")   Wt 86.2 kg   SpO2 99%   BMI 26.50 kg/m²          Physical Exam    Constitutional: Oriented to person, place, and time.  Appears well-developed. No distress.   Head: Normocephalic and atraumatic.   Eyes: Conjunctivae are normal. Pupils are equal, round, and reactive to light.   Neck: Normal range of motion. Neck supple.  No pain posteriorly.  No stiffness or meningismus.  Cardiovascular: Normal rate, regular rhythm and intact and equal distal pulses.    Pulmonary/Chest: Effort normal. No respiratory distress.  No audible wheeze or crackles  Abdominal: Soft. There is no tenderness. There is no guarding.   Musculoskeletal: Normal range of motion. No edema or tenderness.   Neurological: Alert and oriented to person, place, and time.  No gross focal deficits.  Confused to events this evening only.  Skin: Skin is warm and dry.   Psychiatric: Normal mood and affect.  Behavior is normal.   Nursing note and vitals reviewed.    Differential diagnosis includes recurrent seizure, vasovagal event, head injury, alcohol withdrawal and alcohol withdrawal seizure, electrolyte abnormality.      ED Course     Labs Reviewed   BASIC METABOLIC PANEL (8) - Abnormal; Notable for the following components:       Result Value    Glucose 176 (*)     Potassium 3.0 (*)     BUN 8 (*)     Calcium, Total 8.5 (*)     All other components within normal limits   HEPATIC FUNCTION PANEL (7) - Abnormal; Notable for the following components:     (*)     ALT 83 (*)     Alkaline Phosphatase 217 (*)     Bilirubin, Total 2.6 (*)     Bilirubin, Direct 1.1 (*)     All other components within normal limits   DRUG ABUSE PANEL 10 SCREEN - Abnormal; Notable for the following components:    Cannabinoid Urine Presumed Positive (*)     All other components within normal limits   MAGNESIUM - Abnormal; Notable for the following components:    Magnesium 1.4 (*)     All other components within normal limits   CBC W/ DIFFERENTIAL - Abnormal; Notable for the following components:    RBC 4.06 (*)     HCT  36.1 (*)     RDW-SD 56.7 (*)     RDW 17.4 (*)     .0 (*)     All other components within normal limits   ETHYL ALCOHOL - Normal   CBC WITH DIFFERENTIAL WITH PLATELET    Narrative:     The following orders were created for panel order CBC With Differential With Platelet.  Procedure                               Abnormality         Status                     ---------                               -----------         ------                     CBC W/ DIFFERENTIAL[[id number]]          Abnormal            Final result                 Please view results for these tests on the individual orders.   RAINBOW DRAW LAVENDER   RAINBOW DRAW LIGHT GREEN   RAINBOW DRAW BLUE   RAINBOW DRAW GOLD        EKG timed 12:03 AM shows sinus rate of 88, normal intervals and axis, slightly prolonged QTc 44 ms but no acute ischemic changes     CT HEAD WITHOUT CONTRAST    COMPARISON: None    IMPRESSION:    No acute intracranial process.  No acute bleed, hydrocephalus, herniation or mass-effect.  Chronic right anterior inferior frontal encephalomalacia.  No noncontrast evidence of acute ischemia.  Osseous structures intact.    Report faxed/transmitted at 01:26 AM EST. To discuss the case please call [phone]. If you can't reach me at this number, do not leave a voicemail.  Please call [phone] ext [phone] and ask for the next available Radiologist.    Ellis Lewis MD/PhD  This report has been electronically signed and verified by the Radiologist whose name is printed above.    DD:  03/03/2024/DT:  03/03/2024        US ABDOMEN LIMITED    COMPARISON: CT 1/10/2023    IMPRESSION:  Cirrhotic liver.    Gallbladder sludge.  No gallbladder wall thickening or stones.  Negative Krishnamurthy sign.  No findings of acute cholecystitis.    CBD dilated proximally although tapers normally distally to 3 mm.     Heterogeneous appearance pancreatic head may be sequelae of prior pancreatitis, previously reported pseudocysts.  Consider CT for further  interrogation if concern for worsening pancreatic process.    No right hydronephrosis.      Report faxed/transmitted at 02:22 AM EST. To discuss the case please call [phone]. If you can't reach me at this number, do not leave a voicemail.  Please call [phone] ext [phone] and ask for the next available Radiologist.    Ellis Lewis MD/PhD  This report has been electronically signed and verified by the Radiologist whose name is printed above.    DD:  03/03/2024/DT:  03/03/2024            [facility]        Admission disposition: 3/3/2024  1:55 AM                                        Medical Decision Making  Girlfriend is here with the patient now.  She describes an episode earlier where he passed out and they were not able to get in to the bathroom because he was wedged behind the door.  Patient has evidence of likely some new cirrhosis from alcohol abuse.  He has evidence also of hypokalemia and hypomagnesemia.  These were both replaced.  His QTc is slightly prolonged.  He was given some fluids.  I will keep him overnight.  I did message the hospitalist.  He will be on a remote telemetry bed.  The patient and the girlfriend are both agreeable.  Imaging as noted above.  Vital stable.    Problems Addressed:  Confusion and disorientation: acute illness or injury  Hypokalemia: acute illness or injury  Hypomagnesemia: acute illness or injury  Syncope and collapse: acute illness or injury with systemic symptoms    Amount and/or Complexity of Data Reviewed  External Data Reviewed: notes.     Details: Date of Admission: 1/10/2023     Date of Discharge: 1/21/2023  5:50 PM     Admitting Diagnosis: Hyponatremia [E87.1]  GUILLERMINA (acute kidney injury) (HCC) [N17.9]  Severe sepsis (HCC) [A41.9, R65.20]  Community acquired pneumonia of left lower lobe of lung [J18.9]     Disposition: Home     Discharge Diagnosis: .Principal Problem:    Severe sepsis (HCC)  Active Problems:    Community acquired pneumonia of left lower lobe of lung     GUILLERMINA (acute kidney injury) (HCC)    Hyponatremia    Focal motor seizure (HCC)    Subdural empyema        Hospital Course:  Reason for Admission: Fever and altered mental status     Discharge Physical Exam:      General appearance: Awake and alert   Head: Normocephalic,. PERRL  Neck:supple,   Pulmonary: Coarse breath sounds bilaterally   Cardiovascular: S1, S2 normal,  regular rhythm  Abdominal: soft, non-tender; bowel sounds normal;  Extremities: decreased tenderness in Right knee    Neurologic: Alert and oriented X 3, normal strength and tone     Hospital Course:   patient is a 42-year-old male with no significant past medical history presented to the emergency room with chief complaint of fever and cough for the last 1 week.  According to family, symptoms have been getting worse for the last 4 days and found to be more confused yesterday.   patient also complains of severe right knee pain and swelling but denies any history of trauma.  In the emergency room, the patient was confused.  He was also found to be tachypneic and tachycardic with WBC count of 57. Chest x-ray showed a large left-sided  and a smaller right-sided consolidation.  He also had abnormal LFTs, He was started on IV antibiotics and admitted for further treatment.  Blood cultures came back positive for strep pneumo.  During the hospital stay seen by infectious disease and treated with IV antibiotics.  HIV tested negative patient was found to have altered mental status and and jerking of the upper extremity.   so neurology was consulted and had MRI done which showed subdural empyema at the periphery of the right parietal lobe with a cerebritis.  LP was done on 1/17 culture was negative for gram-positive cocci on smear.  Neurosurgery was consulted recommended medical management versus intervention..  Patient also had a right knee effusion for which Ortho was consulted and received a steroid injection which has improved her pain.  Since her blood  cultures came back negative PICC line was inserted and recommended to continue IVFor 6 weeks ceftriaxone and p.o. vancomycin for 6 weeks with end of treatment on 2/22/2023.  Also recommended to have follow-up imaging to confirm resolution of the infection     Subdural empyema   Had low grade fever last night   Rpted  Blood cx today   Continue IV Abx   S/p LP   CSF No growth   Neuro surgery f/u noted  No need for surgical intervention       Severe sepsis (HCC) resolved      Strep pneumonia bacteremia   Continue IV antibiotics per ID  S/p Picc line insertion   Needs IV Abx for 6 weeks   Arranged home IV infusion on discharge       Focal Seizure   On vimpat        GUILLERMINA (acute kidney injury) (HCC) resolved  Creatinine is normal     Pancreatic pseudocyst  CT abdomen  showed mild acute pancreatitis of the pancreatic head due to chronic alcohol use      History of alcoholic dependence/withdrawal  resolved      Right knee effusion  Status post knee aspiration x 2   S/p steroid injection of Rt knee      Complications: See hospital course    Labs: ordered. Decision-making details documented in ED Course.  Radiology: ordered and independent interpretation performed. Decision-making details documented in ED Course.     Details: By my review of the noncontrast head CT, there is no obvious evidence of intracranial bleeding, intracranial mass or midline shift.  ECG/medicine tests: ordered and independent interpretation performed. Decision-making details documented in ED Course.    Risk  Drug therapy requiring intensive monitoring for toxicity.  Decision regarding hospitalization.        Disposition and Plan     Clinical Impression:  1. Syncope and collapse    2. Confusion and disorientation    3. Hypokalemia    4. Hypomagnesemia         Disposition:  Admit  3/3/2024  1:55 am    Follow-up:  No follow-up provider specified.        Medications Prescribed:  Current Discharge Medication List                            Hospital Problems        Present on Admission  Date Reviewed: 2/6/2023            ICD-10-CM Noted POA    * (Principal) Syncope and collapse R55 3/3/2024 Unknown

## 2024-03-03 NOTE — PLAN OF CARE
Patient is A&Ox4, room air, up ad osmany. Echo done at bedside & EKG see chart for results. No c/o of pain. Plan for cardiac consult. Call light within reach and fall precautions in place.     Problem: Patient Centered Care  Goal: Patient preferences are identified and integrated in the patient's plan of care  Description: Interventions:  - What would you like us to know as we care for you? From home with girlfriend  - Provide timely, complete, and accurate information to patient/family  - Incorporate patient and family knowledge, values, beliefs, and cultural backgrounds into the planning and delivery of care  - Encourage patient/family to participate in care and decision-making at the level they choose  - Honor patient and family perspectives and choices  Outcome: Progressing     Problem: Patient/Family Goals  Goal: Patient/Family Long Term Goal  Description: Patient's Long Term Goal: to go home    Interventions:  - follow medical regimen  - See additional Care Plan goals for specific interventions  Outcome: Progressing  Goal: Patient/Family Short Term Goal  Description: Patient's Short Term Goal: to not have syncopal episodes    Interventions:   - cards consult  - See additional Care Plan goals for specific interventions  Outcome: Progressing     Problem: PAIN - ADULT  Goal: Verbalizes/displays adequate comfort level or patient's stated pain goal  Description: INTERVENTIONS:  - Encourage pt to monitor pain and request assistance  - Assess pain using appropriate pain scale  - Administer analgesics based on type and severity of pain and evaluate response  - Implement non-pharmacological measures as appropriate and evaluate response  - Consider cultural and social influences on pain and pain management  - Manage/alleviate anxiety  - Utilize distraction and/or relaxation techniques  - Monitor for opioid side effects  - Notify MD/LIP if interventions unsuccessful or patient reports new pain  - Anticipate increased pain  with activity and pre-medicate as appropriate  Outcome: Progressing     Problem: RISK FOR INFECTION - ADULT  Goal: Absence of fever/infection during anticipated neutropenic period  Description: INTERVENTIONS  - Monitor WBC  - Administer growth factors as ordered  - Implement neutropenic guidelines  Outcome: Progressing     Problem: CARDIOVASCULAR - ADULT  Goal: Maintains optimal cardiac output and hemodynamic stability  Description: INTERVENTIONS:  - Monitor vital signs, rhythm, and trends  - Monitor for bleeding, hypotension and signs of decreased cardiac output  - Evaluate effectiveness of vasoactive medications to optimize hemodynamic stability  - Monitor arterial and/or venous puncture sites for bleeding and/or hematoma  - Assess quality of pulses, skin color and temperature  - Assess for signs of decreased coronary artery perfusion - ex. Angina  - Evaluate fluid balance, assess for edema, trend weights  Outcome: Progressing  Goal: Absence of cardiac arrhythmias or at baseline  Description: INTERVENTIONS:  - Continuous cardiac monitoring, monitor vital signs, obtain 12 lead EKG if indicated  - Evaluate effectiveness of antiarrhythmic and heart rate control medications as ordered  - Initiate emergency measures for life threatening arrhythmias  - Monitor electrolytes and administer replacement therapy as ordered  Outcome: Progressing

## 2024-03-03 NOTE — PLAN OF CARE
Pt. Arrived to the unit A/Ox4, on RA, VSS with girlfriend at the bedside. Admission completed, pt updated on POC. No acute events. Pt educated on call light use, within reach. Safety measures in place.     Problem: Patient Centered Care  Goal: Patient preferences are identified and integrated in the patient's plan of care  Description: Interventions:  - What would you like us to know as we care for you? From home  - Provide timely, complete, and accurate information to patient/family  - Incorporate patient and family knowledge, values, beliefs, and cultural backgrounds into the planning and delivery of care  - Encourage patient/family to participate in care and decision-making at the level they choose  - Honor patient and family perspectives and choices  Outcome: Progressing    Problem: PAIN - ADULT  Goal: Verbalizes/displays adequate comfort level or patient's stated pain goal  Description: INTERVENTIONS:  - Encourage pt to monitor pain and request assistance  - Assess pain using appropriate pain scale  - Administer analgesics based on type and severity of pain and evaluate response  - Implement non-pharmacological measures as appropriate and evaluate response  - Consider cultural and social influences on pain and pain management  - Manage/alleviate anxiety  - Utilize distraction and/or relaxation techniques  - Monitor for opioid side effects  - Notify MD/LIP if interventions unsuccessful or patient reports new pain  - Anticipate increased pain with activity and pre-medicate as appropriate  Outcome: Progressing     Problem: RISK FOR INFECTION - ADULT  Goal: Absence of fever/infection during anticipated neutropenic period  Description: INTERVENTIONS  - Monitor WBC  - Administer growth factors as ordered  - Implement neutropenic guidelines  Outcome: Progressing     Problem: CARDIOVASCULAR - ADULT  Goal: Maintains optimal cardiac output and hemodynamic stability  Description: INTERVENTIONS:  - Monitor vital signs,  rhythm, and trends  - Monitor for bleeding, hypotension and signs of decreased cardiac output  - Evaluate effectiveness of vasoactive medications to optimize hemodynamic stability  - Monitor arterial and/or venous puncture sites for bleeding and/or hematoma  - Assess quality of pulses, skin color and temperature  - Assess for signs of decreased coronary artery perfusion - ex. Angina  - Evaluate fluid balance, assess for edema, trend weights  Outcome: Progressing  Goal: Absence of cardiac arrhythmias or at baseline  Description: INTERVENTIONS:  - Continuous cardiac monitoring, monitor vital signs, obtain 12 lead EKG if indicated  - Evaluate effectiveness of antiarrhythmic and heart rate control medications as ordered  - Initiate emergency measures for life threatening arrhythmias  - Monitor electrolytes and administer replacement therapy as ordered  Outcome: Progressing

## 2024-03-03 NOTE — H&P
[facility]  part of [facility]    History & Physical    Rodolfo Valdez Patient Status:  Emergency    1980 MRN [id number]   Location [facility] EMERGENCY DEPARTMENT Attending Leonel Fontaine MD   Hosp Day # 0 PCP Jeff Anthony D'Amico, DO     Date:  3/3/2024  Date of Admission:  3/2/2024    Chief Complaint:  Chief Complaint   Patient presents with    Syncope       History of Present Illness:  Rodolfo Valdez is a(n) 43 year old male, who presents for evaluation after a syncopal episode at home. PMHx significant for alcohol abuse, focal motor seizure.  Patient accompanied by girlfriend at bedside who reports that she heard as if someone fell to the ground in the bathroom and she went to check and saw the patient slumped over on the floor.  He was unconscious for about 2 to 3 minutes and then regained consciousness but was confused repeatedly asking the same questions.  No seizure-like activity observed.  No bladder or bowel incontinence.  Patient does remember going to the bathroom but then nothing after.  He denies feel lightheaded or dizzy prior to the episode.  He does report alcohol use with last drink 3 to 4 days prior to presentation.  Denies any history of DTs or admissions for alcohol withdrawal.  States that he has been drinking since the age of 19.  He also uses cannabinoids daily.  During my evaluation, patient is alert and oriented x 4 denies any headache or change in vision.  Denies any neck pain, chest pain, shortness of breath.  Denies any history of heart disease or any family history of sudden cardiac death.  Denies recent illness.  Reports that he is able to eat and drink without any issues.  Denies melena, hematochezia.  Reports normal bowel movements.  Per chart review, he was hospitalized at [facility] 1/10-23 for strep pneumo bacteremia, was found to have subdural empyema for which she was treated with IV antibiotics.   Patient currently denies any fever or chills.  He does report wishes to stop drinking alcohol.  Denies previous syncopal episodes.  On presentation to the ED, initial vital signs reassuring with temp 98, heart rate 92, respiratory rate 19, blood pressure 140/102.  Lab work remarkable for potassium level 3.0, magnesium level 1.4, elevated LFTs consistent with alcoholic hepatitis, , ALT 83, total bilirubin 2.6.  CT head without any acute intra cranial abnormalities, no mass effect or bleeding.  Abdominal ultrasound does reveal cirrhotic liver otherwise unremarkable.  EKG significant for prolonged .  Patient was treated with magnesium sulfate 2 g IV x 1, potassium chloride 40 mill equivalents, 1 L IV fluid    History:  History reviewed. No pertinent past medical history.  History reviewed. No pertinent surgical history.  No family history on file.   reports that he has been smoking cigarettes. He has never used smokeless tobacco. He reports current alcohol use. He reports that he does not use drugs.    Allergies:  Allergies   Allergen Reactions    Penicillins ANGIOEDEMA       Home Medications:  Prior to Admission Medications   Prescriptions Last Dose Informant Patient Reported? Taking?   allopurinol 100 MG Oral Tab   No No   Sig: Take 1 tablet (100 mg total) by mouth daily.   lidocaine-menthol 4-1 % External Patch   No No   Sig: Place 1 patch onto the skin daily.   Patient not taking: No sig reported      Facility-Administered Medications: None       Review of Systems:  Constitutional: Negative  Eye:  Negative.  Ear/Nose/Mouth/Throat:  Negative.  Respiratory:  Negative  Cardiovascular: Negative  Gastrointestinal:  Negative.  Genitourinary:  Negative  Endocrine:  Negative.  Immunologic:  Negative.  Musculoskeletal:  Negative.  Integumentary:  Negative.  Neurologic:  Negative.  Psychiatric:  Negative.  ROS reviewed as documented in chart    Physical Exam:  Temp:  [98 °F (36.7 °C)] 98 °F (36.7 °C)  Pulse:   [85-92] 85  Resp:  [12-33] 33  BP: (133-144)/() 144/98  SpO2:  [96 %-99 %] 96 %    General:  Alert and oriented.  Diffuse skin problem:  None.  Eye:  Pupils are equal, round and reactive to light, extraocular movements are intact, Normal conjunctiva.  HENT:  Normocephalic, oral mucosa is moist.  Head:  Normocephalic, atraumatic.  Neck:  Supple, non-tender, no carotid bruit, no jugular venous distention, no lymphadenopathy, no thyromegaly.  Respiratory:  Lungs are clear to auscultation, respirations are non-labored, breath sounds are equal, symmetrical chest wall expansion.  Cardiovascular:  Normal rate, regular rhythm, no murmur, no edema.  Gastrointestinal:  Soft, non-tender, non-distended, normal bowel sounds, no organomegaly.  Lymphatics:  No lymphadenopathy neck, axilla, groin.  Musculoskeletal: Normal range of motion.  normal strength.  Feet:  Normal pulses.  Neurologic:  Alert, oriented, no focal deficits, cranial nerves II-XII are grossly intact.  Cognition and Speech:  Oriented, speech clear and coherent.  Psychiatric:  Cooperative, appropriate mood & affect.      Laboratory Data:   Lab Results   Component Value Date    WBC 5.1 03/02/2024    HGB 13.2 03/02/2024    HCT 36.1 03/02/2024    .0 03/02/2024    CREATSERUM 0.75 03/02/2024    BUN 8 03/02/2024     03/02/2024    K 3.0 03/02/2024     03/02/2024    CO2 23.0 03/02/2024     03/02/2024    CA 8.5 03/02/2024    ALB 4.6 03/02/2024    ALKPHO 217 03/02/2024    BILT 2.6 03/02/2024    TP 7.9 03/02/2024     03/02/2024    ALT 83 03/02/2024    MG 1.4 03/02/2024    ETOH <3 03/02/2024       Imaging:  No results found.     Assessment and Plan:    Syncopal episode  -Admitted for further workup of syncopal episode.  Vasovagal versus cardiac.  EKG noted to have QT prolongation which could be contributing to the event.  Previous EKGs in the EMR without any  QT prolongation.  CT head without intracranial abnormalities, mass effect or  bleeding.  He does have a history of focal motor seizures but he is not on any antiseizure medications.  No postictal state.  -Will obtain complete echocardiogram to look for any cardiac abnormalities  -Maintain on telemetry.  If no arrhythmia observed during hospital stay, patient may need outpatient heart monitor.  -Fall precautions/seizure precautions    QT prolongation  -Noted to have QT prolongation of 484, suspecting secondary to electrolyte abnormalities as below.  -Daily EKGs to monitor Qtc.  Obtain complete echo to further evaluate for any cardiac abnormalities    Hypokalemia  Hypomagnesemia  -Noted to have potassium level 3.0 as well as magnesium level 1.4.  Suspecting secondary to ongoing alcohol use.  Repleted in the ED  -Continue with replacement per protocol    Liver cirrhosis  Alcoholic hepatitis  -LFTs reveal  ALT 83 alk phos 217.  Abdominal ultrasound does reveal cirrhotic liver.  -Continue to trend LFTs.  Send hepatitis panel.  Patient will need closer follow-up as an outpatient.    Alcohol abuse.  Currently with no signs or symptoms of withdrawal.  Ethanol level within normal limits  -Monitor for tremor, tachycardia, psychosis, anxiety  -Alcohol cessation encouraged  -Social work for resources  -Scheduled daily folate/thiamine/multivitamins    Thrombocytopenia  -Noted to have decreased platelet count 133, likely in the setting of ongoing alcohol use  -Continue to closely monitor with daily CBC    Prophylaxis  Subcutaneous heparin    CODE STATUS  Full    Primary care physician  Jeff Anthony D'Amico, DO    60 minutes spent on this admission - examining patient, obtaining history, reviewing previous medical records, going over test results/imaging and discussing plan of care. All questions answered.     Disposition  Clinical course will dictate outcome      Eula Estrada MD  3/3/2024  2:23 AM

## 2024-03-04 VITALS
RESPIRATION RATE: 16 BRPM | HEART RATE: 80 BPM | BODY MASS INDEX: 24.85 KG/M2 | TEMPERATURE: 98 F | WEIGHT: 177.5 LBS | DIASTOLIC BLOOD PRESSURE: 89 MMHG | HEIGHT: 71 IN | SYSTOLIC BLOOD PRESSURE: 138 MMHG | OXYGEN SATURATION: 97 %

## 2024-03-04 LAB
ALBUMIN SERPL-MCNC: 4.2 G/DL (ref 3.2–4.8)
ALP LIVER SERPL-CCNC: 157 U/L
ALT SERPL-CCNC: 63 U/L
ANION GAP SERPL CALC-SCNC: 7 MMOL/L (ref 0–18)
AST SERPL-CCNC: 179 U/L (ref ?–34)
ATRIAL RATE: 74 BPM
BASOPHILS # BLD AUTO: 0.02 X10(3) UL (ref 0–0.2)
BASOPHILS NFR BLD AUTO: 0.3 %
BILIRUB DIRECT SERPL-MCNC: 0.9 MG/DL (ref ?–0.3)
BILIRUB SERPL-MCNC: 1.9 MG/DL (ref 0.3–1.2)
BUN BLD-MCNC: 7 MG/DL (ref 9–23)
BUN/CREAT SERPL: 11.1 (ref 10–20)
CALCIUM BLD-MCNC: 8.9 MG/DL (ref 8.7–10.4)
CHLORIDE SERPL-SCNC: 106 MMOL/L (ref 98–112)
CO2 SERPL-SCNC: 25 MMOL/L (ref 21–32)
CREAT BLD-MCNC: 0.63 MG/DL
DEPRECATED RDW RBC AUTO: 57.1 FL (ref 35.1–46.3)
EGFRCR SERPLBLD CKD-EPI 2021: 122 ML/MIN/1.73M2 (ref 60–?)
EOSINOPHIL # BLD AUTO: 0.1 X10(3) UL (ref 0–0.7)
EOSINOPHIL NFR BLD AUTO: 1.6 %
ERYTHROCYTE [DISTWIDTH] IN BLOOD BY AUTOMATED COUNT: 16.9 % (ref 11–15)
GLUCOSE BLD-MCNC: 102 MG/DL (ref 70–99)
HCT VFR BLD AUTO: 33.4 %
HGB BLD-MCNC: 12 G/DL
IMM GRANULOCYTES # BLD AUTO: 0.01 X10(3) UL (ref 0–1)
IMM GRANULOCYTES NFR BLD: 0.2 %
LYMPHOCYTES # BLD AUTO: 3.26 X10(3) UL (ref 1–4)
LYMPHOCYTES NFR BLD AUTO: 51.3 %
MAGNESIUM SERPL-MCNC: 1.6 MG/DL (ref 1.6–2.6)
MCH RBC QN AUTO: 33.1 PG (ref 26–34)
MCHC RBC AUTO-ENTMCNC: 35.9 G/DL (ref 31–37)
MCV RBC AUTO: 92 FL
MONOCYTES # BLD AUTO: 0.53 X10(3) UL (ref 0.1–1)
MONOCYTES NFR BLD AUTO: 8.3 %
NEUTROPHILS # BLD AUTO: 2.44 X10 (3) UL (ref 1.5–7.7)
NEUTROPHILS # BLD AUTO: 2.44 X10(3) UL (ref 1.5–7.7)
NEUTROPHILS NFR BLD AUTO: 38.3 %
OSMOLALITY SERPL CALC.SUM OF ELEC: 284 MOSM/KG (ref 275–295)
P AXIS: 69 DEGREES
P-R INTERVAL: 138 MS
PLATELET # BLD AUTO: 107 10(3)UL (ref 150–450)
POTASSIUM SERPL-SCNC: 3.5 MMOL/L (ref 3.5–5.1)
PROT SERPL-MCNC: 7 G/DL (ref 5.7–8.2)
Q-T INTERVAL: 402 MS
QRS DURATION: 82 MS
QTC CALCULATION (BEZET): 446 MS
R AXIS: 47 DEGREES
RBC # BLD AUTO: 3.63 X10(6)UL
SODIUM SERPL-SCNC: 138 MMOL/L (ref 136–145)
T AXIS: 54 DEGREES
VENTRICULAR RATE: 74 BPM
VIT B12 SERPL-MCNC: 538 PG/ML (ref 211–911)
WBC # BLD AUTO: 6.4 X10(3) UL (ref 4–11)

## 2024-03-04 PROCEDURE — 99239 HOSP IP/OBS DSCHRG MGMT >30: CPT | Performed by: HOSPITALIST

## 2024-03-04 RX ORDER — MAGNESIUM OXIDE 400 MG/1
400 TABLET ORAL ONCE
Status: COMPLETED | OUTPATIENT
Start: 2024-03-04 | End: 2024-03-04

## 2024-03-04 NOTE — PLAN OF CARE
Patient alert & oriented x4. Patient denies any dizziness or lightheadedness. Patient ambulates independently with a steady gait. Patient updated on POC and verbalized understanding.     Problem: Patient Centered Care  Goal: Patient preferences are identified and integrated in the patient's plan of care  Description: Interventions:  - What would you like us to know as we care for you? From home with girlfriend  - Provide timely, complete, and accurate information to patient/family  - Incorporate patient and family knowledge, values, beliefs, and cultural backgrounds into the planning and delivery of care  - Encourage patient/family to participate in care and decision-making at the level they choose  - Honor patient and family perspectives and choices  Outcome: Progressing     Problem: Patient/Family Goals  Goal: Patient/Family Long Term Goal  Description: Patient's Long Term Goal: to go home    Interventions:  - Monitor labs and vital signs  - Follow provider recommendations  - See additional Care Plan goals for specific interventions  Outcome: Progressing  Goal: Patient/Family Short Term Goal  Description: Patient's Short Term Goal: to not have syncopal episodes    Interventions:   - Further testing  - Monitor labs and vital signs  - Follow provider recommentations  - See additional Care Plan goals for specific interventions  Outcome: Progressing     Problem: PAIN - ADULT  Goal: Verbalizes/displays adequate comfort level or patient's stated pain goal  Description: INTERVENTIONS:  - Encourage pt to monitor pain and request assistance  - Assess pain using appropriate pain scale  - Administer analgesics based on type and severity of pain and evaluate response  - Implement non-pharmacological measures as appropriate and evaluate response  - Consider cultural and social influences on pain and pain management  - Manage/alleviate anxiety  - Utilize distraction and/or relaxation techniques  - Monitor for opioid side  effects  - Notify MD/LIP if interventions unsuccessful or patient reports new pain  - Anticipate increased pain with activity and pre-medicate as appropriate  Outcome: Progressing     Problem: RISK FOR INFECTION - ADULT  Goal: Absence of fever/infection during anticipated neutropenic period  Description: INTERVENTIONS  - Monitor WBC  - Administer growth factors as ordered  - Implement neutropenic guidelines  Outcome: Progressing     Problem: CARDIOVASCULAR - ADULT  Goal: Maintains optimal cardiac output and hemodynamic stability  Description: INTERVENTIONS:  - Monitor vital signs, rhythm, and trends  - Monitor for bleeding, hypotension and signs of decreased cardiac output  - Evaluate effectiveness of vasoactive medications to optimize hemodynamic stability  - Monitor arterial and/or venous puncture sites for bleeding and/or hematoma  - Assess quality of pulses, skin color and temperature  - Assess for signs of decreased coronary artery perfusion - ex. Angina  - Evaluate fluid balance, assess for edema, trend weights  Outcome: Progressing  Goal: Absence of cardiac arrhythmias or at baseline  Description: INTERVENTIONS:  - Continuous cardiac monitoring, monitor vital signs, obtain 12 lead EKG if indicated  - Evaluate effectiveness of antiarrhythmic and heart rate control medications as ordered  - Initiate emergency measures for life threatening arrhythmias  - Monitor electrolytes and administer replacement therapy as ordered  Outcome: Progressing     Problem: SAFETY ADULT - FALL  Goal: Free from fall injury  Description: INTERVENTIONS:  - Assess pt frequently for physical needs  - Identify cognitive and physical deficits and behaviors that affect risk of falls.  - [place] fall precautions as indicated by assessment.  - Educate pt/family on patient safety including physical limitations  - Instruct pt to call for assistance with activity based on assessment  - Modify environment to reduce risk of injury  - Provide  assistive devices as appropriate  - Consider OT/PT consult to assist with strengthening/mobility  - Encourage toileting schedule  Outcome: Progressing     Problem: DISCHARGE PLANNING  Goal: Discharge to home or other facility with appropriate resources  Description: INTERVENTIONS:  - Identify barriers to discharge w/pt and caregiver  - Include patient/family/discharge partner in discharge planning  - Arrange for needed discharge resources and transportation as appropriate  - Identify discharge learning needs (meds, wound care, etc)  - Arrange for interpreters to assist at discharge as needed  - Consider post-discharge preferences of patient/family/discharge partner  - Complete POLST form as appropriate  - Assess patient's ability to be responsible for managing their own health  - Refer to Case Management Department for coordinating discharge planning if the patient needs post-hospital services based on physician/LIP order or complex needs related to functional status, cognitive ability or social support system  Outcome: Progressing

## 2024-03-04 NOTE — PROGRESS NOTES
[facility] - CARDIOLOGY PROGRESS NOTE  Rodolfo Valdez Patient Status:  Inpatient    1980 MRN [id number]   Location [facility] 3W/SW Attending Edward Gilliland MD   Hosp Day # 1 PCP Jeff Anthony D'Amico,      Assessment:    1.  Syncope.  Uncertain etiology but no evidence for arrhythmia on telemetry thus far.  Normal echo.  No symptoms of ischemia.      Plan:    No further inpatient evaluation indicated.    2.  Consider MCT after discharge.  1.      Subjective:  No chest pain or shortness of breath.    Objective:  BP (!) 142/93 (BP Location: Right arm)   Pulse 104   Temp 98.4 °F (36.9 °C) (Oral)   Resp 16   Ht 180.3 cm (5' 11\")   Wt 177 lb 7.5 oz (80.5 kg)   SpO2 99%   BMI 24.75 kg/m²     Temp (24hrs), Av.7 °F (37.1 °C), Min:98.2 °F (36.8 °C), Max:99.3 °F (37.4 °C)      Intake/Output:    Intake/Output Summary (Last 24 hours) at 3/4/2024 1529  Last data filed at 3/4/2024 0936  Gross per 24 hour   Intake 1280 ml   Output --   Net 1280 ml       Wt Readings from Last 2 Encounters:   24 177 lb 7.5 oz (80.5 kg)   23 165 lb (74.8 kg)       Physical Exam:    General: Alert and oriented x 3,  No apparent distress.  HEENT: No focal deficits.  Neck: No JVD, carotids 2+ no bruits.  Cardiac: Regular rate and rhythm, S1, S2 normal, no murmur  Lungs: Clear without wheezes, rales, rhonchi.  Normal excursions and effort.  Abdomen: Soft, non-tender.   Extremities: Without clubbing, cyanosis or edema.  Peripheral pulses are 2+.  Skin: Warm and dry.     Labs:  Lab Results   Component Value Date    WBC 6.4 2024    HGB 12.0 2024    HCT 33.4 2024    .0 2024     No results found for: \"PT\", \"INR\"  Lab Results   Component Value Date     2024    K 3.5 2024     2024    CO2 25.0 2024    BUN 7 2024    CREATSERUM 0.63 2024      03/04/2024    MG 1.6 03/04/2024    CA 8.9 03/04/2024    ALT 63 03/04/2024     03/04/2024    ALB 4.2 03/04/2024        No results found for: \"TROP\", \"CKMB\"     Medications:     heparin  5,000 Units Subcutaneous 2 times per day    thiamine  100 mg Oral Daily    folic acid  1 mg Oral Daily    multivitamin  1 tablet Oral Daily         Helder Chester MD  3/4/2024  3:29 PM

## 2024-03-04 NOTE — PLAN OF CARE
Stable vital signs. Denies pain. Seizure precaution maintained. No seizure activity. Daily EKG.    Problem: Patient Centered Care  Goal: Patient preferences are identified and integrated in the patient's plan of care  Description: Interventions:  - What would you like us to know as we care for you? From home with girlfriend  - Provide timely, complete, and accurate information to patient/family  - Incorporate patient and family knowledge, values, beliefs, and cultural backgrounds into the planning and delivery of care  - Encourage patient/family to participate in care and decision-making at the level they choose  - Honor patient and family perspectives and choices  Outcome: Progressing     Problem: PAIN - ADULT  Goal: Verbalizes/displays adequate comfort level or patient's stated pain goal  Description: INTERVENTIONS:  - Encourage pt to monitor pain and request assistance  - Assess pain using appropriate pain scale  - Administer analgesics based on type and severity of pain and evaluate response  - Implement non-pharmacological measures as appropriate and evaluate response  - Consider cultural and social influences on pain and pain management  - Manage/alleviate anxiety  - Utilize distraction and/or relaxation techniques  - Monitor for opioid side effects  - Notify MD/LIP if interventions unsuccessful or patient reports new pain  - Anticipate increased pain with activity and pre-medicate as appropriate  Outcome: Progressing     Problem: RISK FOR INFECTION - ADULT  Goal: Absence of fever/infection during anticipated neutropenic period  Description: INTERVENTIONS  - Monitor WBC  - Administer growth factors as ordered  - Implement neutropenic guidelines  Outcome: Progressing     Problem: CARDIOVASCULAR - ADULT  Goal: Maintains optimal cardiac output and hemodynamic stability  Description: INTERVENTIONS:  - Monitor vital signs, rhythm, and trends  - Monitor for bleeding, hypotension and signs of decreased cardiac  output  - Evaluate effectiveness of vasoactive medications to optimize hemodynamic stability  - Monitor arterial and/or venous puncture sites for bleeding and/or hematoma  - Assess quality of pulses, skin color and temperature  - Assess for signs of decreased coronary artery perfusion - ex. Angina  - Evaluate fluid balance, assess for edema, trend weights  Outcome: Progressing  Goal: Absence of cardiac arrhythmias or at baseline  Description: INTERVENTIONS:  - Continuous cardiac monitoring, monitor vital signs, obtain 12 lead EKG if indicated  - Evaluate effectiveness of antiarrhythmic and heart rate control medications as ordered  - Initiate emergency measures for life threatening arrhythmias  - Monitor electrolytes and administer replacement therapy as ordered  Outcome: Progressing     Problem: SAFETY ADULT - FALL  Goal: Free from fall injury  Description: INTERVENTIONS:  - Assess pt frequently for physical needs  - Identify cognitive and physical deficits and behaviors that affect risk of falls.  - [place] fall precautions as indicated by assessment.  - Educate pt/family on patient safety including physical limitations  - Instruct pt to call for assistance with activity based on assessment  - Modify environment to reduce risk of injury  - Provide assistive devices as appropriate  - Consider OT/PT consult to assist with strengthening/mobility  - Encourage toileting schedule  Outcome: Progressing     Problem: DISCHARGE PLANNING  Goal: Discharge to home or other facility with appropriate resources  Description: INTERVENTIONS:  - Identify barriers to discharge w/pt and caregiver  - Include patient/family/discharge partner in discharge planning  - Arrange for needed discharge resources and transportation as appropriate  - Identify discharge learning needs (meds, wound care, etc)  - Arrange for interpreters to assist at discharge as needed  - Consider post-discharge preferences of patient/family/discharge partner  -  Complete POLST form as appropriate  - Assess patient's ability to be responsible for managing their own health  - Refer to Case Management Department for coordinating discharge planning if the patient needs post-hospital services based on physician/LIP order or complex needs related to functional status, cognitive ability or social support system  Outcome: Progressing

## 2024-03-04 NOTE — PAYOR COMM NOTE
--------------  ADMISSION REVIEW   3/3  Payor: [facility]  Subscriber #:  [id number]  Authorization Number: [id number]    Admit date: 3/3/24  Admit time:  3:53 AM       REVIEW DOCUMENTATION:     ED Provider Notes        ED Provider Notes signed by Leonel Fontaine MD at 3/3/2024  1:55 AM    Patient Seen in: [facility] Emergency Department      History     Chief Complaint   Patient presents with    Syncope     Stated Complaint: syncope    Subjective:   HPI    89-year-old male brought by EMS after his girlfriend reportedly heard something in the bathroom went in and he was slumped over on the floor.  He seemed confused.  Patient states he does remember trying to go to the bathroom but does not remember anything after.  Medics reported he was confused repetitively asking the same questions.  History listed below a little over a year ago when the patient was admitted for strep pneumonia bacteremia and sepsis and ultimately a subdural empyema.  He has a history of alcohol abuse.  Girlfriend is in [place].  Patient denies head or neck pain.  No chest pain or shortness of breath.  No back pain or abdominal pain.  Denies recent illness.  Takes no antiseizure medications.    Positive for stated complaint: syncope  Other systems are as noted in HPI.  Constitutional and vital signs reviewed.      All other systems reviewed and negative except as noted above.    Physical Exam     ED Triage Vitals   BP 03/02/24 2354 (!) 140/102   Pulse 03/02/24 2354 92   Resp 03/02/24 2354 19   Temp 03/03/24 0023 98 °F (36.7 °C)   Temp src 03/03/24 0023 Temporal   SpO2 03/02/24 2354 97 %   O2 Device 03/02/24 2354 None (Room air)       Current:BP (!) 141/98   Pulse 88   Temp 98 °F (36.7 °C) (Temporal)   Resp 24   Ht 180.3 cm (5' 11\")   Wt 86.2 kg   SpO2 99%   BMI 26.50 kg/m²   Constitutional: Oriented to person, place, and time.  Appears well-developed. No distress.   Head: Normocephalic and atraumatic.    Eyes: Conjunctivae are normal. Pupils are equal, round, and reactive to light.   Neck: Normal range of motion. Neck supple.  No pain posteriorly.  No stiffness or meningismus.  Cardiovascular: Normal rate, regular rhythm and intact and equal distal pulses.    Pulmonary/Chest: Effort normal. No respiratory distress.  No audible wheeze or crackles  Abdominal: Soft. There is no tenderness. There is no guarding.   Musculoskeletal: Normal range of motion. No edema or tenderness.   Neurological: Alert and oriented to person, place, and time.  No gross focal deficits.  Confused to events this evening only.  Skin: Skin is warm and dry.   Psychiatric: Normal mood and affect.  Behavior is normal.   Nursing note and vitals reviewed.    Differential diagnosis includes recurrent seizure, vasovagal event, head injury, alcohol withdrawal and alcohol withdrawal seizure, electrolyte abnormality.      ED Course     Labs Reviewed   BASIC METABOLIC PANEL (8) - Abnormal; Notable for the following components:       Result Value    Glucose 176 (*)     Potassium 3.0 (*)     BUN 8 (*)     Calcium, Total 8.5 (*)     All other components within normal limits   HEPATIC FUNCTION PANEL (7) - Abnormal; Notable for the following components:     (*)     ALT 83 (*)     Alkaline Phosphatase 217 (*)     Bilirubin, Total 2.6 (*)     Bilirubin, Direct 1.1 (*)     All other components within normal limits   DRUG ABUSE PANEL 10 SCREEN - Abnormal; Notable for the following components:    Cannabinoid Urine Presumed Positive (*)     All other components within normal limits   MAGNESIUM - Abnormal; Notable for the following components:    Magnesium 1.4 (*)     All other components within normal limits   CBC W/ DIFFERENTIAL - Abnormal; Notable for the following components:    RBC 4.06 (*)     HCT 36.1 (*)     RDW-SD 56.7 (*)     RDW 17.4 (*)     .0 (*)     All other components within normal limits   ETHYL ALCOHOL - Normal   CBC WITH  DIFFERENTIAL WITH PLATELET    Narrative:     The following orders were created for panel order CBC With Differential With Platelet.  Procedure                               Abnormality         Status                     ---------                               -----------         ------                     CBC W/ DIFFERENTIAL[[id number]]          Abnormal            Final result                 Please view results for these tests on the individual orders.   RAINBOW DRAW LAVENDER   RAINBOW DRAW LIGHT GREEN   RAINBOW DRAW BLUE   RAINBOW DRAW GOLD    EKG timed 12:03 AM shows sinus rate of 88, normal intervals and axis, slightly prolonged QTc 44 ms but no acute ischemic changes     CT HEAD WITHOUT CONTRAST    COMPARISON: None    IMPRESSION:    No acute intracranial process.  No acute bleed, hydrocephalus, herniation or mass-effect.  Chronic right anterior inferior frontal encephalomalacia.  No noncontrast evidence of acute ischemia.  Osseous structures intact.    Report faxed/transmitted at 01:26 AM EST. To discuss the case please call [phone]. If you can't reach me at this number, do not leave a voicemail.  Please call [phone] ext [phone] and ask for the next available Radiologist.    Ellis Lewis MD/PhD  This report has been electronically signed and verified by the Radiologist whose name is printed above.    DD:  03/03/2024/DT:  03/03/2024        US ABDOMEN LIMITED    COMPARISON: CT 1/10/2023    IMPRESSION:  Cirrhotic liver.    Gallbladder sludge.  No gallbladder wall thickening or stones.  Negative Krishnamurthy sign.  No findings of acute cholecystitis.    CBD dilated proximally although tapers normally distally to 3 mm.     Heterogeneous appearance pancreatic head may be sequelae of prior pancreatitis, previously reported pseudocysts.  Consider CT for further interrogation if concern for worsening pancreatic process.    No right hydronephrosis.      Report faxed/transmitted at 02:22 AM EST. To discuss the case please  call [phone]. If you can't reach me at this number, do not leave a voicemail.  Please call [phone] ext [phone] and ask for the next available Radiologist.    Ellis Lewis MD/PhD  This report has been electronically signed and verified by the Radiologist whose name is printed above.    DD:  03/03/2024/DT:  03/03/2024         Admission disposition: 3/3/2024  1:55 AM           Medical Decision Making  Girlfriend is here with the patient now.  She describes an episode earlier where he passed out and they were not able to get in to the bathroom because he was wedged behind the door.  Patient has evidence of likely some new cirrhosis from alcohol abuse.  He has evidence also of hypokalemia and hypomagnesemia.  These were both replaced.  His QTc is slightly prolonged.  He was given some fluids.  I will keep him overnight.  I did message the hospitalist.  He will be on a remote telemetry bed.  The patient and the girlfriend are both agreeable.  Imaging as noted above.  Vital stable.    Problems Addressed:  Confusion and disorientation: acute illness or injury  Hypokalemia: acute illness or injury  Hypomagnesemia: acute illness or injury  Syncope and collapse: acute illness or injury with systemic symptoms    Amount and/or Complexity of Data Reviewed  External Data Reviewed: notes.     Details: Date of Admission: 1/10/2023     Date of Discharge: 1/21/2023  5:50 PM     Admitting Diagnosis: Hyponatremia [E87.1]  GUILLERMINA (acute kidney injury) (HCC) [N17.9]  Severe sepsis (HCC) [A41.9, R65.20]  Community acquired pneumonia of left lower lobe of lung [J18.9]     Disposition: Home     Discharge Diagnosis: .Principal Problem:    Severe sepsis (HCC)  Active Problems:    Community acquired pneumonia of left lower lobe of lung    GUILLERMINA (acute kidney injury) (HCC)    Hyponatremia    Focal motor seizure (HCC)    Subdural empyema        Hospital Course:  Reason for Admission: Fever and altered mental status     Discharge Physical Exam:       General appearance: Awake and alert   Head: Normocephalic,. PERRL  Neck:supple,   Pulmonary: Coarse breath sounds bilaterally   Cardiovascular: S1, S2 normal,  regular rhythm  Abdominal: soft, non-tender; bowel sounds normal;  Extremities: decreased tenderness in Right knee    Neurologic: Alert and oriented X 3, normal strength and tone     Hospital Course:   patient is a 42-year-old male with no significant past medical history presented to the emergency room with chief complaint of fever and cough for the last 1 week.  According to family, symptoms have been getting worse for the last 4 days and found to be more confused yesterday.   patient also complains of severe right knee pain and swelling but denies any history of trauma.  In the emergency room, the patient was confused.  He was also found to be tachypneic and tachycardic with WBC count of 57. Chest x-ray showed a large left-sided  and a smaller right-sided consolidation.  He also had abnormal LFTs, He was started on IV antibiotics and admitted for further treatment.  Blood cultures came back positive for strep pneumo.  During the hospital stay seen by infectious disease and treated with IV antibiotics.  HIV tested negative patient was found to have altered mental status and and jerking of the upper extremity.   so neurology was consulted and had MRI done which showed subdural empyema at the periphery of the right parietal lobe with a cerebritis.  LP was done on 1/17 culture was negative for gram-positive cocci on smear.  Neurosurgery was consulted recommended medical management versus intervention..  Patient also had a right knee effusion for which Ortho was consulted and received a steroid injection which has improved her pain.  Since her blood cultures came back negative PICC line was inserted and recommended to continue IVFor 6 weeks ceftriaxone and p.o. vancomycin for 6 weeks with end of treatment on 2/22/2023.  Also recommended to have follow-up  imaging to confirm resolution of the infection     Subdural empyema   Had low grade fever last night   Rpted  Blood cx today   Continue IV Abx   S/p LP   CSF No growth   Neuro surgery f/u noted  No need for surgical intervention       Severe sepsis (HCC) resolved      Strep pneumonia bacteremia   Continue IV antibiotics per ID  S/p Picc line insertion   Needs IV Abx for 6 weeks   Arranged home IV infusion on discharge       Focal Seizure   On vimpat        GUILLERMINA (acute kidney injury) (HCC) resolved  Creatinine is normal     Pancreatic pseudocyst  CT abdomen  showed mild acute pancreatitis of the pancreatic head due to chronic alcohol use      History of alcoholic dependence/withdrawal  resolved      Right knee effusion  Status post knee aspiration x 2   S/p steroid injection of Rt knee      Complications: See hospital course    Labs: ordered. Decision-making details documented in ED Course.  Radiology: ordered and independent interpretation performed. Decision-making details documented in ED Course.     Details: By my review of the noncontrast head CT, there is no obvious evidence of intracranial bleeding, intracranial mass or midline shift.  ECG/medicine tests: ordered and independent interpretation performed. Decision-making details documented in ED Course.    Risk  Drug therapy requiring intensive monitoring for toxicity.  Decision regarding hospitalization.        Disposition and Plan     Clinical Impression:  1. Syncope and collapse    2. Confusion and disorientation    3. Hypokalemia    4. Hypomagnesemia         Disposition:  Admit  3/3/2024  1:55 am  Hospital Problems       Present on Admission  Date Reviewed: 2/6/2023            ICD-10-CM Noted POA    * (Principal) Syncope and collapse R55 3/3/2024 Unknown                     3/3 H&P  Patient presents with    Syncope         History of Present Illness:  Rodolfo Valdez is a(n) 43 year old male, who presents for evaluation after a syncopal episode at  home. PMHx significant for alcohol abuse, focal motor seizure.  Patient accompanied by girlfriend at bedside who reports that she heard as if someone fell to the ground in the bathroom and she went to check and saw the patient slumped over on the floor.  He was unconscious for about 2 to 3 minutes and then regained consciousness but was confused repeatedly asking the same questions.  No seizure-like activity observed.  No bladder or bowel incontinence.  Patient does remember going to the bathroom but then nothing after.  He denies feel lightheaded or dizzy prior to the episode.  He does report alcohol use with last drink 3 to 4 days prior to presentation.  Denies any history of DTs or admissions for alcohol withdrawal.  States that he has been drinking since the age of 19.  He also uses cannabinoids daily.  During my evaluation, patient is alert and oriented x 4 denies any headache or change in vision.  Denies any neck pain, chest pain, shortness of breath.  Denies any history of heart disease or any family history of sudden cardiac death.  Denies recent illness.  Reports that he is able to eat and drink without any issues.  Denies melena, hematochezia.  Reports normal bowel movements.  Per chart review, he was hospitalized at [facility] 1/10-1/21/23 for strep pneumo bacteremia, was found to have subdural empyema for which she was treated with IV antibiotics.  Patient currently denies any fever or chills.  He does report wishes to stop drinking alcohol.  Denies previous syncopal episodes.  On presentation to the ED, initial vital signs reassuring with temp 98, heart rate 92, respiratory rate 19, blood pressure 140/102.  Lab work remarkable for potassium level 3.0, magnesium level 1.4, elevated LFTs consistent with alcoholic hepatitis, , ALT 83, total bilirubin 2.6.  CT head without any acute intra cranial abnormalities, no mass effect or bleeding.  Abdominal ultrasound does reveal cirrhotic  liver otherwise unremarkable.  EKG significant for prolonged .  Patient was treated with magnesium sulfate 2 g IV x 1, potassium chloride 40 mill equivalents, 1 L IV fluid     Assessment and Plan:     Syncopal episode  -Admitted for further workup of syncopal episode.  Vasovagal versus cardiac.  EKG noted to have QT prolongation which could be contributing to the event.  Previous EKGs in the EMR without any  QT prolongation.  CT head without intracranial abnormalities, mass effect or bleeding.  He does have a history of focal motor seizures but he is not on any antiseizure medications.  No postictal state.  -Will obtain complete echocardiogram to look for any cardiac abnormalities  -Maintain on telemetry.  If no arrhythmia observed during hospital stay, patient may need outpatient heart monitor.  -Fall precautions/seizure precautions     QT prolongation  -Noted to have QT prolongation of 484, suspecting secondary to electrolyte abnormalities as below.  -Daily EKGs to monitor Qtc.  Obtain complete echo to further evaluate for any cardiac abnormalities     Hypokalemia  Hypomagnesemia  -Noted to have potassium level 3.0 as well as magnesium level 1.4.  Suspecting secondary to ongoing alcohol use.  Repleted in the ED  -Continue with replacement per protocol     Liver cirrhosis  Alcoholic hepatitis  -LFTs reveal  ALT 83 alk phos 217.  Abdominal ultrasound does reveal cirrhotic liver.  -Continue to trend LFTs.  Send hepatitis panel.  Patient will need closer follow-up as an outpatient.     Alcohol abuse.  Currently with no signs or symptoms of withdrawal.  Ethanol level within normal limits  -Monitor for tremor, tachycardia, psychosis, anxiety  -Alcohol cessation encouraged  -Social work for resources  -Scheduled daily folate/thiamine/multivitamins     Thrombocytopenia  -Noted to have decreased platelet count 133, likely in the setting of ongoing alcohol use  -Continue to closely monitor with daily CBC      Prophylaxis  Subcutaneous heparin     CODE STATUS  Full                   3/3 Cardiology  Syncope  Likely multifactorial will check EF with echo  Possibly related to the below conditions and vasovagal autonomic dysfunction  Likely vasovagal     Alcohol abuse history  Addressed by hospitalist  Liver cirrhosis on ultrasound     Hypokalemia  3.0  On replacement     Hypomagnesia him  1.4  Replacement     Abnormal EKG  Mildly prolonged QTc interval  Likely electrolyte related     Thrombocytopenia  133     Recommendations:     Telemetry  Echocardiogram  Electrolyte replacement     Reason for Consultation:      Syncope     History of Present Illness:   Patient is a 43 year old male who was admitted to the hospital for syncope.     Patient presented for evaluation regarding syncope.  His past medical history significant for alcohol abuse and focal motor seizures companied by girlfriend at bedside.  She reports he heard as of someone fell to the ground to the bathroom checks on the patient slumped on the floor.  He was unconscious for about 2 minutes and then regained consciousness but was confused was asking the same questions.  There is no seizure-like activity observed.  No bowel or bladder incontinence.  Patient does not remember going the bathroom nothing after.  Denies feeling lightheaded or dizzy prior to the episode.  Does report alcohol use with last drink 3 to 4 days prior to presentation.  Denies history of withdrawal.  No chest pain or shortness of breath no previous syncope.     Cardiac tests:  Head CT no acute intracranial hemorrhage or mass effect  Abdominal ultrasound cirrhotic liver  EKG sinus rhythm QTc 484  Hepatitis panel negative  TSH elevated 5.5 but free T41.0  Hemoglobin 12.1        MEDICATIONS ADMINISTERED IN LAST 1 DAY:  multivitamin (Centrum) chewable tab (Adult) 1 tablet       Date Action Dose Route User    3/4/2024 0930 Given 1 tablet Oral Krysten Aguilera, DEONNA          folic acid (Folvite) tab 1 mg        Date Action Dose Route User    3/4/2024 0930 Given 1 mg Oral Krysten Aguilera RN          heparin (Porcine) 5000 UNIT/ML injection 5,000 Units       Date Action Dose Route User    3/4/2024 0931 Given 5,000 Units Subcutaneous (Right Upper Arm) Krysten Aguilera RN    3/3/2024 2119 Given 5,000 Units Subcutaneous (Left Lower Abdomen) Barbie Jackson RN          magnesium oxide (Mag-Ox) tab 400 mg       Date Action Dose Route User    3/4/2024 1014 Given 400 mg Oral Krysten Aguilera RN          thiamine (Vitamin B1) tab 100 mg       Date Action Dose Route User    3/4/2024 0930 Given 100 mg Oral Krysten Aguilera RN            Vitals (last day)       Date/Time Temp Pulse Resp BP SpO2 Weight O2 Device O2 Flow Rate (L/min) [facility]    03/04/24 0926 98.4 °F (36.9 °C) 104 16 142/93 99 % -- None (Room air) -- CK    03/04/24 0200 98.7 °F (37.1 °C) 83 14 135/88 100 % -- None (Room air) -- GO    03/03/24 2118 99.3 °F (37.4 °C) 86 15 146/99 100 % -- None (Room air) -- GO    03/03/24 1823 98.2 °F (36.8 °C) 92 20 145/101 99 % -- None (Room air) -- LD    03/03/24 0943 98.6 °F (37 °C) 100 20 141/106 99 % -- None (Room air) -- LD    03/03/24 0402 99.5 °F (37.5 °C) 84 18 151/96 97 % 177 lb 7.5 oz None (Room air) -- MD    03/03/24 0315 -- 95 20 -- 97 % -- None (Room air) -- KS    03/03/24 0145 -- 85 33 144/98 96 % -- None (Room air) -- KS    03/03/24 0115 -- 88 24 141/98 99 % -- None (Room air) -- KS    03/03/24 0023 98 °F (36.7 °C) -- -- -- -- -- None (Room air) -- KS    03/03/24 0015 -- 89 12 133/101 97 % -- None (Room air) -- KS          CIWA Scores (since admission)       None

## 2024-03-04 NOTE — PROGRESS NOTES
Wheelchair offered but patient declined. Patient walked to exit for discharge with partner. Patient ambulates independently with a steady gait. Patient and partner Laura were carrying patient's belongings. No belongings left in room.

## 2024-03-04 NOTE — DISCHARGE PLANNING
Patient was provided with discharge instructions, education, and follow up information. No new prescriptions at time of discharge. 30 day MCT monitor ordered by cardiology. Patient verbalizes understanding of follow up information, specifically PCP and cardiology. Patient has no questions after reviewing all instructions and will be going home.     Sakshi YING, Discharge Leader [id number]

## 2024-03-05 ENCOUNTER — PATIENT OUTREACH (OUTPATIENT)
Dept: CASE MANAGEMENT | Age: 44
End: 2024-03-05

## 2024-03-05 NOTE — PROGRESS NOTES
Left message on mailbox for pt to call [facility] back for HFU.  [facility] contact information [phone]  included in message.

## 2024-03-05 NOTE — PROGRESS NOTES
NCM placed call to patient for HFU Non-TCM, LM requesting a call back to [phone] with a condition update.    Discharge Dx:   Syncope and collapse  Confusion and disorientation  Hypokalemia  Hypomagnesemia

## 2024-03-06 NOTE — DISCHARGE SUMMARY
[facility]  part of [facility]    Discharge Summary    Rodolfo Valdez Patient Status:  Inpatient    1980 MRN [id number]   Location [facility] 3W/SW Attending No att. providers found   Hosp Day # 1 PCP Jeff Anthony D'Amico, DO     Date of Admission: 3/2/2024 Disposition:   Home or Self Care     Date of Discharge:  3/4/2024  5:28 PM    Admitting Diagnosis:   Syncope and collapse [R55]  Hypokalemia [E87.6]  Hypomagnesemia [E83.42]  Confusion and disorientation [R41.0]    Hospital Discharge Diagnoses:    Syncopal episode  Probable dehydration   QT prolongation  Hypokalemia  Hypomagnesemia  Hx of Liver cirrhosis  Alcoholic hepatitis  Alcohol abuse  Thrombocytopenia    Problem List:     Patient Active Problem List   Diagnosis    Severe sepsis (HCC)    Community acquired pneumonia of left lower lobe of lung    GUILLERMINA (acute kidney injury) (HCC)    Hyponatremia    Focal motor seizure (HCC)    Subdural empyema (HCC)    Syncope and collapse    Confusion and disorientation    Hypokalemia    Hypomagnesemia       Physical Exam:      /89 (BP Location: Right arm)   Pulse 80   Temp 98 °F (36.7 °C) (Oral)   Resp 16   Ht 5' 11\" (1.803 m)   Wt 177 lb 7.5 oz (80.5 kg)   SpO2 97%   BMI 24.75 kg/m²     Gen:  NAD.  A and O x  3  CV:   RRR.  No m/g/r  Pulm:   CTA bilat  Abd:   +bs, soft, NT, ND  LE:  No c/c/e  Neuro:  nonfocal      Reason for Admission:   Syncope    History of Present Illness:   Rodolfo Valdez is a(n) 43 year old male, who presents for evaluation after a syncopal episode at home. PMHx significant for alcohol abuse, focal motor seizure.  Patient accompanied by girlfriend at bedside who reports that she heard as if someone fell to the ground in the bathroom and she went to check and saw the patient slumped over on the floor.  He was unconscious for about 2 to 3 minutes and then regained consciousness but was confused repeatedly asking the same questions.  No  seizure-like activity observed.  No bladder or bowel incontinence.  Patient does remember going to the bathroom but then nothing after.  He denies feel lightheaded or dizzy prior to the episode.  He does report alcohol use with last drink 3 to 4 days prior to presentation.  Denies any history of DTs or admissions for alcohol withdrawal.  States that he has been drinking since the age of 19.  He also uses cannabinoids daily.  During my evaluation, patient is alert and oriented x 4 denies any headache or change in vision.  Denies any neck pain, chest pain, shortness of breath.  Denies any history of heart disease or any family history of sudden cardiac death.  Denies recent illness.  Reports that he is able to eat and drink without any issues.  Denies melena, hematochezia.  Reports normal bowel movements.  Per chart review, he was hospitalized at [facility] 1/10-1/21/23 for strep pneumo bacteremia, was found to have subdural empyema for which she was treated with IV antibiotics.  Patient currently denies any fever or chills.  He does report wishes to stop drinking alcohol.  Denies previous syncopal episodes.  On presentation to the ED, initial vital signs reassuring with temp 98, heart rate 92, respiratory rate 19, blood pressure 140/102.  Lab work remarkable for potassium level 3.0, magnesium level 1.4, elevated LFTs consistent with alcoholic hepatitis, , ALT 83, total bilirubin 2.6.  CT head without any acute intra cranial abnormalities, no mass effect or bleeding.  Abdominal ultrasound does reveal cirrhotic liver otherwise unremarkable.  EKG significant for prolonged .  Patient was treated with magnesium sulfate 2 g IV x 1, potassium chloride 40 mill equivalents, 1 L IV fluid     Hospital Course:     Syncopal episode  Probably vasovagal or related to dehydration.  Cardiology was consulted.  Echo with EF 60-65%  No arrhythmias on tele.  Ok for dc home.  PCP f/u.      QT  prolongation  Mild.  Likely due to electrolytes which were replaced.     Hypokalemia  Hypomagnesemia  Replaced per protocol.     Hx of Liver cirrhosis  Alcoholic hepatitis  Pt likely still drinking some etoh.  Liver enzymes downtrended in hospital.  Counseled on etoh cessation.     Alcohol abuse.    -Alcohol cessation encouraged  -Social work for resources  -Scheduled daily folate/thiamine/multivitamins     Thrombocytopenia  Stable.     Prophylaxis  Subcutaneous heparin     CODE STATUS  Full    Consultations:   Cardiology       Discharge Condition:  Good    Discharge Medications:      Discharge Medications        ASK your doctor about these medications        Instructions Prescription details   lidocaine-menthol 4-1 % Ptch      Place 1 patch onto the skin daily.   Quantity: 30 patch  Refills: 0              Follow up Visits:     Follow-up Information       D'Amico, Jeff Anthony, . Schedule an appointment as soon as possible for a visit in 1 week(s).    Specialty: Internal Medicine  Contact information:  [address] [facility] [address] [phone]               Helder Chester MD. Schedule an appointment as soon as possible for a visit.    Specialties: Interventional, Cardiology, CARDIOLOGY  Contact information:  [address] [facility]  [address]  [place] [id number] [phone]                             Hospital Discharge Diagnoses:  Syncope    Lace+ Score: 24  59-90 High Risk  29-58 Medium Risk  0-28   Low Risk.    TCM Follow-Up Recommendation:  LACE < 29: Low Risk of readmission after discharge from the hospital; Still recommend for TCM follow-up.    >35 minutes spent preparing this discharge.    Edward Juan MD  3/5/2024  6:02 PM

## 2024-04-27 ENCOUNTER — HOSPITAL ENCOUNTER (INPATIENT)
Facility: HOSPITAL | Age: 44
LOS: 2 days | Discharge: HOME | End: 2024-04-29
Attending: EMERGENCY MEDICINE | Admitting: HOSPITALIST
Payer: MEDICAID

## 2024-04-27 ENCOUNTER — APPOINTMENT (OUTPATIENT)
Dept: CT IMAGING | Facility: HOSPITAL | Age: 44
End: 2024-04-27
Attending: EMERGENCY MEDICINE
Payer: MEDICAID

## 2024-04-27 DIAGNOSIS — F10.10 ETOH ABUSE: ICD-10-CM

## 2024-04-27 DIAGNOSIS — G62.1 NEUROPATHY, ALCOHOLIC (HCC): ICD-10-CM

## 2024-04-27 DIAGNOSIS — E83.42 HYPOMAGNESEMIA: ICD-10-CM

## 2024-04-27 DIAGNOSIS — G40.409 GENERALIZED TONIC-CLONIC SEIZURE (HCC): Primary | ICD-10-CM

## 2024-04-27 DIAGNOSIS — E87.6 HYPOKALEMIA: ICD-10-CM

## 2024-04-27 LAB
ALBUMIN SERPL-MCNC: 4.5 G/DL (ref 3.2–4.8)
ALBUMIN/GLOB SERPL: 1.4 {RATIO} (ref 1–2)
ALP LIVER SERPL-CCNC: 160 U/L
ALT SERPL-CCNC: 79 U/L
AMPHET UR QL SCN: NEGATIVE
ANION GAP SERPL CALC-SCNC: 18 MMOL/L (ref 0–18)
APTT PPP: 26.5 SECONDS (ref 23.3–35.6)
AST SERPL-CCNC: 257 U/L (ref ?–34)
BARBITURATES UR QL SCN: NEGATIVE
BASOPHILS # BLD AUTO: 0.02 X10(3) UL (ref 0–0.2)
BASOPHILS NFR BLD AUTO: 0.3 %
BENZODIAZ UR QL SCN: NEGATIVE
BILIRUB SERPL-MCNC: 3 MG/DL (ref 0.3–1.2)
BUN BLD-MCNC: 10 MG/DL (ref 9–23)
BUN/CREAT SERPL: 11.5 (ref 10–20)
CALCIUM BLD-MCNC: 9.7 MG/DL (ref 8.7–10.4)
CHLORIDE SERPL-SCNC: 97 MMOL/L (ref 98–112)
CO2 SERPL-SCNC: 21 MMOL/L (ref 21–32)
COCAINE UR QL: NEGATIVE
CREAT BLD-MCNC: 0.87 MG/DL
CREAT UR-SCNC: 30.7 MG/DL
DEPRECATED RDW RBC AUTO: 51.3 FL (ref 35.1–46.3)
EGFRCR SERPLBLD CKD-EPI 2021: 110 ML/MIN/1.73M2 (ref 60–?)
EOSINOPHIL # BLD AUTO: 0.07 X10(3) UL (ref 0–0.7)
EOSINOPHIL NFR BLD AUTO: 1.1 %
ERYTHROCYTE [DISTWIDTH] IN BLOOD BY AUTOMATED COUNT: 16.2 % (ref 11–15)
ETHANOL SERPL-MCNC: <3 MG/DL (ref ?–3)
FENTANYL UR QL SCN: NEGATIVE
GLOBULIN PLAS-MCNC: 3.2 G/DL (ref 2.8–4.4)
GLUCOSE BLD-MCNC: 157 MG/DL (ref 70–99)
GLUCOSE BLDC GLUCOMTR-MCNC: 178 MG/DL (ref 70–99)
HCT VFR BLD AUTO: 36.8 %
HGB BLD-MCNC: 13.5 G/DL
IMM GRANULOCYTES # BLD AUTO: 0.02 X10(3) UL (ref 0–1)
IMM GRANULOCYTES NFR BLD: 0.3 %
INR BLD: 1.04 (ref 0.8–1.2)
LIPASE SERPL-CCNC: 78 U/L (ref 13–75)
LYMPHOCYTES # BLD AUTO: 2.63 X10(3) UL (ref 1–4)
LYMPHOCYTES NFR BLD AUTO: 40.8 %
MAGNESIUM SERPL-MCNC: 1.4 MG/DL (ref 1.6–2.6)
MCH RBC QN AUTO: 32 PG (ref 26–34)
MCHC RBC AUTO-ENTMCNC: 36.7 G/DL (ref 31–37)
MCV RBC AUTO: 87.2 FL
MDMA UR QL SCN: NEGATIVE
METHADONE UR QL SCN: NEGATIVE
MONOCYTES # BLD AUTO: 0.67 X10(3) UL (ref 0.1–1)
MONOCYTES NFR BLD AUTO: 10.4 %
NEUTROPHILS # BLD AUTO: 3.03 X10 (3) UL (ref 1.5–7.7)
NEUTROPHILS # BLD AUTO: 3.03 X10(3) UL (ref 1.5–7.7)
NEUTROPHILS NFR BLD AUTO: 47.1 %
OPIATES UR QL SCN: NEGATIVE
OSMOLALITY SERPL CALC.SUM OF ELEC: 284 MOSM/KG (ref 275–295)
OXYCODONE UR QL SCN: NEGATIVE
PCP UR QL SCN: NEGATIVE
PLATELET # BLD AUTO: 69 10(3)UL (ref 150–450)
PLATELETS.RETICULATED NFR BLD AUTO: 20.3 % (ref 0–7)
POTASSIUM SERPL-SCNC: 2.9 MMOL/L (ref 3.5–5.1)
PROT SERPL-MCNC: 7.7 G/DL (ref 5.7–8.2)
PROTHROMBIN TIME: 14.3 SECONDS (ref 11.6–14.8)
RBC # BLD AUTO: 4.22 X10(6)UL
SODIUM SERPL-SCNC: 136 MMOL/L (ref 136–145)
WBC # BLD AUTO: 6.4 X10(3) UL (ref 4–11)

## 2024-04-27 PROCEDURE — 99222 1ST HOSP IP/OBS MODERATE 55: CPT | Performed by: OTHER

## 2024-04-27 PROCEDURE — 99223 1ST HOSP IP/OBS HIGH 75: CPT | Performed by: HOSPITALIST

## 2024-04-27 PROCEDURE — HZ2ZZZZ DETOXIFICATION SERVICES FOR SUBSTANCE ABUSE TREATMENT: ICD-10-PCS | Performed by: HOSPITALIST

## 2024-04-27 PROCEDURE — 70450 CT HEAD/BRAIN W/O DYE: CPT | Performed by: EMERGENCY MEDICINE

## 2024-04-27 RX ORDER — POTASSIUM CHLORIDE 1.5 G/1.58G
40 POWDER, FOR SOLUTION ORAL ONCE
Status: COMPLETED | OUTPATIENT
Start: 2024-04-27 | End: 2024-04-27

## 2024-04-27 RX ORDER — ACETAMINOPHEN 325 MG/1
TABLET ORAL
Status: COMPLETED
Start: 2024-04-27 | End: 2024-04-27

## 2024-04-27 RX ORDER — HYDROCODONE BITARTRATE AND ACETAMINOPHEN 5; 325 MG/1; MG/1
1 TABLET ORAL EVERY 6 HOURS PRN
Status: DISCONTINUED | OUTPATIENT
Start: 2024-04-27 | End: 2024-04-29

## 2024-04-27 RX ORDER — MELATONIN
100 3 TIMES DAILY
Status: DISCONTINUED | OUTPATIENT
Start: 2024-04-27 | End: 2024-04-29

## 2024-04-27 RX ORDER — FOLIC ACID 1 MG/1
1 TABLET ORAL DAILY
Status: DISCONTINUED | OUTPATIENT
Start: 2024-04-27 | End: 2024-04-29

## 2024-04-27 RX ORDER — LORAZEPAM 1 MG/1
2 TABLET ORAL
Status: DISCONTINUED | OUTPATIENT
Start: 2024-04-27 | End: 2024-04-28

## 2024-04-27 RX ORDER — SENNOSIDES 8.6 MG
17.2 TABLET ORAL NIGHTLY PRN
Status: DISCONTINUED | OUTPATIENT
Start: 2024-04-27 | End: 2024-04-29

## 2024-04-27 RX ORDER — ONDANSETRON 2 MG/ML
4 INJECTION INTRAMUSCULAR; INTRAVENOUS EVERY 6 HOURS PRN
Status: DISCONTINUED | OUTPATIENT
Start: 2024-04-27 | End: 2024-04-29

## 2024-04-27 RX ORDER — BISACODYL 10 MG
10 SUPPOSITORY, RECTAL RECTAL
Status: DISCONTINUED | OUTPATIENT
Start: 2024-04-27 | End: 2024-04-29

## 2024-04-27 RX ORDER — MAGNESIUM OXIDE 400 MG/1
800 TABLET ORAL ONCE
Status: DISCONTINUED | OUTPATIENT
Start: 2024-04-27 | End: 2024-04-29

## 2024-04-27 RX ORDER — SODIUM CHLORIDE 9 MG/ML
INJECTION, SOLUTION INTRAVENOUS CONTINUOUS
Status: DISCONTINUED | OUTPATIENT
Start: 2024-04-27 | End: 2024-04-28

## 2024-04-27 RX ORDER — NICOTINE 21 MG/24HR
1 PATCH, TRANSDERMAL 24 HOURS TRANSDERMAL DAILY
Status: DISCONTINUED | OUTPATIENT
Start: 2024-04-27 | End: 2024-04-29

## 2024-04-27 RX ORDER — POTASSIUM CHLORIDE 20 MEQ/1
40 TABLET, EXTENDED RELEASE ORAL ONCE
Status: DISCONTINUED | OUTPATIENT
Start: 2024-04-27 | End: 2024-04-27

## 2024-04-27 RX ORDER — ENEMA 19; 7 G/133ML; G/133ML
1 ENEMA RECTAL ONCE AS NEEDED
Status: DISCONTINUED | OUTPATIENT
Start: 2024-04-27 | End: 2024-04-29

## 2024-04-27 RX ORDER — LORAZEPAM 1 MG/1
1 TABLET ORAL
Status: DISCONTINUED | OUTPATIENT
Start: 2024-04-27 | End: 2024-04-28

## 2024-04-27 RX ORDER — POTASSIUM CHLORIDE 20 MEQ/1
40 TABLET, EXTENDED RELEASE ORAL EVERY 4 HOURS
Status: DISPENSED | OUTPATIENT
Start: 2024-04-27 | End: 2024-04-27

## 2024-04-27 RX ORDER — POLYETHYLENE GLYCOL 3350 17 G/17G
17 POWDER, FOR SOLUTION ORAL DAILY PRN
Status: DISCONTINUED | OUTPATIENT
Start: 2024-04-27 | End: 2024-04-29

## 2024-04-27 RX ORDER — LEVETIRACETAM 500 MG/5ML
500 INJECTION, SOLUTION, CONCENTRATE INTRAVENOUS EVERY 12 HOURS
Status: DISCONTINUED | OUTPATIENT
Start: 2024-04-27 | End: 2024-04-29

## 2024-04-27 RX ORDER — CHOLECALCIFEROL (VITAMIN D3) 125 MCG
1000 CAPSULE ORAL DAILY
Status: DISCONTINUED | OUTPATIENT
Start: 2024-04-27 | End: 2024-04-29

## 2024-04-27 RX ORDER — LEVETIRACETAM 500 MG/5ML
1000 INJECTION, SOLUTION, CONCENTRATE INTRAVENOUS ONCE
Status: COMPLETED | OUTPATIENT
Start: 2024-04-27 | End: 2024-04-27

## 2024-04-27 RX ORDER — PROCHLORPERAZINE EDISYLATE 5 MG/ML
5 INJECTION INTRAMUSCULAR; INTRAVENOUS EVERY 8 HOURS PRN
Status: DISCONTINUED | OUTPATIENT
Start: 2024-04-27 | End: 2024-04-29

## 2024-04-27 NOTE — ED PROVIDER NOTES
Patient Seen in: [facility] Emergency Department      History     Chief Complaint   Patient presents with    Seizure Disorder     Stated Complaint: sz like activity x2, etoh abuse, last drink 48 hrs ago.    Subjective:   HPI    44-year-old male presents via EMS for evaluation for seizure.  EMS reports that beverly reported that he had 2 generalized tonic-clonic seizures.  Each lasted about 2 minutes.  EMS reports that patient had a brain infection a few years ago and was admitted and had seizures at that time.  Patient reports that he drinks half a pint of alcohol daily and his last drink was about 3 days ago.  He states that he just did not feel like drinking.  He denies any headache, chest pain, shortness of breath, nausea, vomiting, abdominal pain, focal neurologic symptoms.    Objective:   Past Medical History:    Seizure disorder (HCC)              History reviewed. No pertinent surgical history.             Social History     Socioeconomic History    Marital status: Single   Tobacco Use    Smoking status: Every Day     Types: Cigarettes    Smokeless tobacco: Never   Vaping Use    Vaping status: Never Used   Substance and Sexual Activity    Alcohol use: Yes    Drug use: Never     Social Determinants of Health     Food Insecurity: Unknown (3/3/2024)    Food Insecurity     Food Insecurity: Patient declined   Transportation Needs: Unknown (3/3/2024)    Transportation Needs     Lack of Transportation: Patient declined   Housing Stability: Unknown (3/3/2024)    Housing Stability     Housing Instability: Patient declined              Review of Systems    Positive for stated complaint: sz like activity x2, etoh abuse, last drink 48 hrs ago.  Other systems are as noted in HPI.  Constitutional and vital signs reviewed.      All other systems reviewed and negative except as noted above.    Physical Exam     ED Triage Vitals   BP 04/27/24 1100 (!) 120/91   Pulse 04/27/24 1100 106   Resp 04/27/24 1103 18   Temp  04/27/24 1103 98.8 °F (37.1 °C)   Temp src 04/27/24 1103 Oral   SpO2 04/27/24 1103 97 %   O2 Device 04/27/24 1103 None (Room air)       Current:BP (!) 120/91   Pulse 107   Temp 98.8 °F (37.1 °C) (Oral)   Resp 18   Ht 180.3 cm (5' 11\")   Wt 79.4 kg   SpO2 97%   BMI 24.41 kg/m²         Physical Exam  Vitals and nursing note reviewed.   Constitutional:       Appearance: Normal appearance.   HENT:      Head: Normocephalic. Abrasion present.      Mouth/Throat:      Mouth: Oral lesions (abrasions to tongue bilaterally) present.   Eyes:      General: Lids are normal.      Extraocular Movements: Extraocular movements intact.      Pupils: Pupils are equal, round, and reactive to light.   Cardiovascular:      Rate and Rhythm: Normal rate and regular rhythm.      Pulses: Normal pulses.   Pulmonary:      Effort: Pulmonary effort is normal.      Breath sounds: Normal breath sounds.   Musculoskeletal:         General: Normal range of motion.        Arms:       Cervical back: Full passive range of motion without pain and normal range of motion. No pain with movement, spinous process tenderness or muscular tenderness. Normal range of motion.   Skin:     General: Skin is warm and dry.   Neurological:      General: No focal deficit present.      Mental Status: He is alert.      Cranial Nerves: No cranial nerve deficit, dysarthria or facial asymmetry.      Sensory: Sensation is intact.      Motor: Motor function is intact.      Coordination: Coordination is intact.      Gait: Gait is intact.               ED Course     Labs Reviewed   COMP METABOLIC PANEL (14) - Abnormal; Notable for the following components:       Result Value    Glucose 157 (*)     Potassium 2.9 (*)     Chloride 97 (*)     ALT 79 (*)      (*)     Alkaline Phosphatase 160 (*)     Bilirubin, Total 3.0 (*)     All other components within normal limits   MAGNESIUM - Abnormal; Notable for the following components:    Magnesium 1.4 (*)     All other  components within normal limits   LIPASE - Abnormal; Notable for the following components:    Lipase 78 (*)     All other components within normal limits   RBC MORPHOLOGY SCAN - Abnormal; Notable for the following components:    RBC Morphology See morphology below (*)     Platelet Morphology See morphology below (*)     Giant platelets Few (*)     All other components within normal limits   POCT GLUCOSE - Abnormal; Notable for the following components:    POC Glucose  178 (*)     All other components within normal limits   CBC W/ DIFFERENTIAL - Abnormal; Notable for the following components:    RBC 4.22 (*)     HCT 36.8 (*)     RDW-SD 51.3 (*)     RDW 16.2 (*)     PLT 69.0 (*)     Immature Platelet Fraction 20.3 (*)     All other components within normal limits   PROTHROMBIN TIME (PT) - Normal   PTT, ACTIVATED - Normal   ETHYL ALCOHOL - Normal   CBC WITH DIFFERENTIAL WITH PLATELET    Narrative:     The following orders were created for panel order CBC With Differential With Platelet.  Procedure                               Abnormality         Status                     ---------                               -----------         ------                     CBC W/ DIFFERENTIAL[[id number]]          Abnormal            Final result                 Please view results for these tests on the individual orders.   SCAN SLIDE   DRUG ABUSE PANEL 11 SCREEN     EKG    Rate, intervals and axes as noted on EKG Report.  Rate: 101  Rhythm: Sinus Rhythm  Reading: no stemi, interpreted by myself.              ED Course as of 04/27/24 1207  ------------------------------------------------------------  Time: 04/27 1143  Value: CT BRAIN OR HEAD (85002)  Comment: Per my independent interpretation, patient's CT Head demonstrates no intracranial hemorrhage.                [facility]        Admission disposition: 4/27/2024 12:03 PM                                        Medical Decision Making  Differential diagnosis includes but is not limited to  alcohol withdrawal seizure, seizure disorder, electrolyte disturbance, intracranial injury, etc.    On arrival patient was postictal but quickly returned to normal mental status.  Patient did have findings on exam consistent with a generalized tonic-clonic seizure as reported.  Head CT was negative.  Patient's labs show thrombocytopenia, hypokalemia, hypomagnesemia, and transaminitis all consistent with alcohol abuse.  Case discussed with neurology who recommends starting him on Keppra.  Patient will be admitted for electrolyte replacement and evaluation for seizures. CIWA is 3.    Rhythm Strip: Rate 100 sinus The cardiac monitor was ordered secondary to the patient's electrolyte abnormality and seizures.     Complicating factors: The patient  has a past medical history of Seizure disorder (HCC). and  has no past surgical history on file. that contribute to the medical complexity of this ED evaluation.     Medical Record Review: I personally reviewed available prior medical records for any recent pertinent discharge summaries, testing, and procedures, and reviewed those reports.        Problems Addressed:  ETOH abuse: chronic illness or injury with severe exacerbation, progression, or side effects of treatment  Generalized tonic-clonic seizure (HCC): acute illness or injury with systemic symptoms  Hypokalemia: acute illness or injury with systemic symptoms  Hypomagnesemia: acute illness or injury with systemic symptoms    Amount and/or Complexity of Data Reviewed  Independent Historian: EMS     Details: As per HPI  External Data Reviewed: notes.     Details: Patient's past hospital admissions reviewed.  March 2 March 4 of this year patient was admitted for syncope, hypokalemia and hypomagnesemia.  In January 2023 patient was admitted from January 10 January 21 with pneumonia, sepsis and empyema secondary to strep pneumonia.  Labs: ordered. Decision-making details documented in ED Course.  Radiology: ordered and  independent interpretation performed. Decision-making details documented in ED Course.  ECG/medicine tests: ordered and independent interpretation performed. Decision-making details documented in ED Course.  Discussion of management or test interpretation with external provider(s): Case discussed with Dr. Sunshine with neuro who recs starting keppra 1g IV as this may be epilepsy as opposed to alcohol withdrawal.  Dr. Montanez accepts admission under Dr. SUDHA Rivera.    Risk  Decision regarding hospitalization.    Critical Care  Total time providing critical care: minutes (48 minutes including time spent examining and re-evaluating the patient, ordering and reviewing laboratory tests, documenting, reviewing previous records, obtaining information from the family, and speaking with consultants, admitting doctors, nurses and medics and excludes any time spent on procedures.  )        Disposition and Plan     Clinical Impression:  1. Generalized tonic-clonic seizure (HCC)    2. Hypokalemia    3. Hypomagnesemia    4. ETOH abuse         Disposition:  Admit  4/27/2024 12:03 pm    Follow-up:  No follow-up provider specified.        Medications Prescribed:  Current Discharge Medication List                            Hospital Problems       Present on Admission  Date Reviewed: 2/6/2023            ICD-10-CM Noted POA    * (Principal) Generalized tonic-clonic seizure (HCC) G40.409 4/27/2024 Unknown

## 2024-04-27 NOTE — CONSULTS
[facility]  [address], [address]  [facility] [address]  [phone]          INPATIENT NEUROLOGY   INITIAL CONSULT NOTE       [facility]  part of [facility]    Report of Consultation    Rodolfo Valdez Patient Status:  Emergency     1980 MRN [id number]    Location [facility] EMERGENCY DEPARTMENT Attending Shon Nelson*    Hosp Day # 0 PCP Jeff Anthony D'Amico, DO      Date of Admission:  2024  Date of Consult:  2024    HPI:     Rodolfo Valdez is a 44 year old man with past medical history of right subdural empyema leading to left focal motor seizures previously on lacosamide, alcohol use disorder presents with 2 generalized seizures.  His last alcoholic drink was about 2 to 3 days ago.    He was last seen by neurology for this 2023 when he was recommended to stay on lacosamide indefinitely.    He says he is more clear-headed since stopping drinking; he says he now remembers the date fully, when he was forgetting the month previously.  His vision is better.  He has alcohol-related neuropathy (\"burning\").  He has headaches with withdrawal but these are better.  No seizures since 2023 until now.        CURRENT MEDICATIONS  Current Outpatient Medications   Medication Sig Dispense Refill    lidocaine-menthol 4-1 % External Patch Place 1 patch onto the skin daily. (Patient not taking: No sig reported) 30 patch 0       OUTPATIENT MEDICATIONS  No current facility-administered medications on file prior to encounter.     Current Outpatient Medications on File Prior to Encounter   Medication Sig Dispense Refill    lidocaine-menthol 4-1 % External Patch Place 1 patch onto the skin daily. (Patient not taking: No sig reported) 30 patch 0        MEDICAL HISTORY  Past Medical History:    Seizure disorder (HCC)       ?SURGICAL HISTORY  History reviewed. No pertinent surgical history.    SOCIAL HISTORY  Social History      Socioeconomic History    Marital status: Single   Tobacco Use    Smoking status: Every Day     Types: Cigarettes    Smokeless tobacco: Never   Vaping Use    Vaping status: Never Used   Substance and Sexual Activity    Alcohol use: Yes    Drug use: Never       FAMILY HISTORY  No family history on file.    ALLERGIES  Allergies   Allergen Reactions    Penicillins ANGIOEDEMA       ?REVIEW OF SYSTEMS:   13-point review of systems was done and is negative unless otherwise stated in HPI.     ?PHYSICAL EXAM:     BP (!) 120/91   Pulse 107   Temp 98.8 °F (37.1 °C) (Oral)   Resp 18   Ht 71\"   Wt 175 lb (79.4 kg)   SpO2 97%   BMI 24.41 kg/m²   General appearance: Well appearing, and in no acute distress  Skin: skin color, texture normal.  No rashes or lesions.    Head: Normocephalic, atraumatic.    Neurological:  Mental Status: Alert, oriented to situation/normal fund of knowledge, Follows commands, and Speech fluent and appropriate.  Cranial Nerves: visual fields intact to confrontation, extraocular movements intact, facial sensation intact, face symmetric, no facial droop or ptosis, normal bedside auditory acuity, no dysarthria  Motor: muscle strength 5/5 both upper and lower extremities  Reflexes: UE and LE reflexes are equal and 1+  Sensation: intact to light touch  Coordination: Finger-to- nose-finger intact bilaterally   Gait: not assessed       LABS/DATA:    Lab Results   Component Value Date    WBC 6.4 04/27/2024    HGB 13.5 04/27/2024    HCT 36.8 04/27/2024    PLT 69.0 04/27/2024    CREATSERUM 0.87 04/27/2024    BUN 10 04/27/2024     04/27/2024    K 2.9 04/27/2024    CL 97 04/27/2024    CO2 21.0 04/27/2024     04/27/2024    CA 9.7 04/27/2024    ALB 4.5 04/27/2024    ALKPHO 160 04/27/2024    BILT 3.0 04/27/2024    TP 7.7 04/27/2024     04/27/2024    ALT 79 04/27/2024    PTT 26.5 04/27/2024    INR 1.04 04/27/2024    LIP 78 04/27/2024    MG 1.4 04/27/2024    ETOH <3 04/27/2024       HGBA1C:   No results found for: \"A1C\", \"EAG\"             IMAGING:  CT BRAIN OR HEAD (77190)    Result Date: 4/27/2024  PROCEDURE: CT BRAIN OR HEAD (CPT=70450)  COMPARISON: [facility], CT BRAIN OR HEAD (CPT=70450), 3/03/2024, 0:05 AM.  INDICATIONS: sz like activity x2, etoh abuse, last drink 48 hrs ago.  TECHNIQUE: CT images were obtained without contrast material.  Automated exposure control for dose reduction was used.  Dose information is transmitted to the ACR (American College of Radiology) NRDR (National Radiology Data Registry) which includes the Dose Index Registry.  Findings and impression:  No skull fracture  Normal midline ventricles  Stable encephalomalacia right frontal lobe  No hemorrhage or mass effect or edema     Dictated by (CST): Virgil Rhodes MD on 4/27/2024 at 11:40 AM     Finalized by (CST): Virgil Rhodes MD on 4/27/2024 at 11:41 AM          I PERSONALLY REVIEWED THESE IMAGES.     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  His last alcoholic drink was about 2 to 3 days ago.      His neurological examination is non-focal.      Focal and generalized epilepsy likely related to his history of right-sided subdural empyema, now exacerbated by alcohol withdrawal  Alcohol-related neuropathy   - Patient is not recommended to come off of his seizure medication.  He should continue Keppra 500 mg twice daily.    - Seizure precautions including no driving for 6 months  - The patient should abstain from further alcohol use   -Thiamine, folic acid and vitamin B12 supplement  -Lidocaine patch to neuropathic pain in legs   -No driving for 6 months - written instructions provided, have asked RN to remind patient before discharge as well     No further inpatient neurological testing needed.  Follow up in 1 month.  Please call w/  further questions.        This note was prepared using Dragon Medical voice recognition dictation software and as a result, errors may occur. When identified, these errors have been corrected. While every attempt is made to correct errors during dictation, discrepancies may still exist    JOSE Sunshine DO   Staff Neurologist   4/27/2024  12:00 PM

## 2024-04-27 NOTE — ED NOTES
Brought via ems for seizure activity x 2, lasting approx 2 mins each.    Per ems, pt uses etoh daily, last drink 2-3 days ago    Pt presents a/ox4, clear speech, nad, no resp distress. Abrasions noted to L elbow and R top of head.

## 2024-04-27 NOTE — DISCHARGE INSTRUCTIONS
SEIZURES   Please do NOT drive until you are cleared by neurology    GENERAL INFORMATION:   A seizure, or convulsion, is uncontrolled jerking of the arms, legs, or face that lasts anywhere from a few seconds to minutes. Seizures can occur after a head injury, stroke, or brain tissue infection. In more than half of patients, the cause is not known. This is called epilepsy.     INSTRUCTIONS:   1. Please continue to take Keppra to prevent seizures, take it exactly as directed. Do not stop taking the medicine without talking to your doctor first, even if you have not had a seizure in a long time.   2. Avoid activities in which a seizure would cause danger to yourself or to others. Don't operate dangerous machinery, swim alone, or climb in high or dangerous places, such as on ladders, roofs, or girders. Do not drive unless your doctor says you may.   3. Wear an emergency medical identification bracelet with information about your seizure. If you have a seizure, people around you will know what is wrong and get appropriate help.   4. If you have any warning that you may have a seizure, lay down in a safe place where you can't hurt yourself.   5. Do not drink alcohol or take illegal drugs. These can make you more susceptible to having seizures.   6. Please avoid potential seizure triggers as much as possible: stress, sleep deprivation, alcohol, successive stimulant use, illegal drugs, illness/infections     CONTACT YOUR DOCTOR IF:   You have any problems that may be related to the medicine you are taking.   Teach your family, close friends, or co-workers what to do if you have a seizure:   1. Stay calm. Keep the person from falling onto harmful objects. Move hard or sharp objects out of the way.   2. Don't force anything into the person's mouth or try to open clenched jaws. Turn the person on his or her side when the violent movement stops or if he or she is vomiting.   3. When the seizure is over, the person may be  confused or drowsy. Reassure the person that he or she is all right. Help him or her to relax.   4. Call 911 and bring the patient back to the ED if:   a. The patient doesn't awaken shortly after the seizure   b. The patient has new problems such as difficulty seeing, speaking or moving   c. The patient was injured during the seizure   d. The patient has a temperature over 102 F (39C)   e. The patient vomited and now is having trouble breathing    Please continue to practice seizure precautions and first aid.   Please do not climb to high places, such as rooftops, up trees or mountain climbing. When near water, you should be supervised by an adult or person who is aware of risk of seizures, for example during tub baths, swimming, boating or fishing. A helmet should be worn when riding a bike.     First Aid for a generalized seizure:   -Remain calm and do not panic, call for assistance if needed.   -Lower the person safely to the ground and loosen any tight clothing.   -Place the person in a side-lying position so any saliva or vomit will easily drain out of the mouth. Actively seizing people are at a increased risk of choking on their saliva or vomit. Do not put any objects such as a tongue depressor or fingers into the mouth. Protect the persons head from injury while they are on their side.   -Time the seizure from start to finish so you know how long it lasted (most grand mal seizures are no more than 1 or 2 minutes long). If the seizure is continuing longer than 5 minutes, call the ambulance at 911 for transportation to the nearest Emergency Room.   -After a grand mal seizure, people are very sleepy and tired for several minutes or even a couple of hours. They may also complain of headache, nausea and may vomit.   Please remember:   Call your doctor if you feel that the severity or number of seizures has changed from baseline.   If you have several grand mal seizures without waking up in-between, please notify  your doctor.     ----------------------------------------------   Seizure safety during sleep:   Always take anti-epileptic medications as prescribed by your doctor.   Objects near the bed may cause injury if someone has a seizure is prone to falling out of bed. Move heavy furniture, floor lamps, night stands and other dangerous objects away from the bed.   Mattresses and pillows should be firm and not soft. All stuffed animals, toys and other objects should be removed from the bed. Blankets can be layered but should be thin, down comforters may be too soft.   Keep the bed as low to the ground as possible. Some patients with night time seizures may sleep with their mattress on the floor. Others may pad the floor with mats (such as exercise mats used in workout facilities) to pad the floor.   During sleep, keep the bedroom door cracked open so someone can hear if you are having a seizure. Never lock the bedroom door. Some people choose to use baby monitors to observe for seizures at night.

## 2024-04-27 NOTE — ED NOTES
Orders for admission, patient is aware of plan and ready to go upstairs. Any questions, please call ED RN zhen hernandez  at extension [phone].   Type of COVID test sent: n/a  COVID Suspicion level: Low    Titratable drug(s) infusing: none   Rate:    LOC at time of transport:a/ox4    Other pertinent information: hx of sz with brain infection and sepsis. Daily etoh use, last drink 3 days ago. Replaced mag and K+. Keppra given, see mar. Urine remains outstanding, no sample provided by patient    CIWA score=3  NIH score=0

## 2024-04-27 NOTE — ED INITIAL ASSESSMENT (HPI)
Brought via ems for seizure activity x 2, lasting approx 2 mins each.     Per ems, pt uses etoh daily, last drink 2-3 days ago     Pt presents a/ox4, clear speech, nad, no resp distress. Abrasions noted to L elbow and R top of head +small hematoma and tenderness    Pt denies hx of sz, denies taking daily medications

## 2024-04-28 LAB
ALBUMIN SERPL-MCNC: 3.9 G/DL (ref 3.2–4.8)
ALBUMIN/GLOB SERPL: 1.4 {RATIO} (ref 1–2)
ALP LIVER SERPL-CCNC: 141 U/L
ALT SERPL-CCNC: 97 U/L
ANION GAP SERPL CALC-SCNC: 6 MMOL/L (ref 0–18)
AST SERPL-CCNC: 341 U/L (ref ?–34)
ATRIAL RATE: 101 BPM
BILIRUB SERPL-MCNC: 2.2 MG/DL (ref 0.3–1.2)
BUN BLD-MCNC: 6 MG/DL (ref 9–23)
BUN/CREAT SERPL: 9.4 (ref 10–20)
C DIFF TOX B STL QL: NEGATIVE
CALCIUM BLD-MCNC: 9 MG/DL (ref 8.7–10.4)
CHLORIDE SERPL-SCNC: 106 MMOL/L (ref 98–112)
CO2 SERPL-SCNC: 27 MMOL/L (ref 21–32)
CREAT BLD-MCNC: 0.64 MG/DL
EGFRCR SERPLBLD CKD-EPI 2021: 120 ML/MIN/1.73M2 (ref 60–?)
GLOBULIN PLAS-MCNC: 2.7 G/DL (ref 2.8–4.4)
GLUCOSE BLD-MCNC: 112 MG/DL (ref 70–99)
MAGNESIUM SERPL-MCNC: 2 MG/DL (ref 1.6–2.6)
MAGNESIUM SERPL-MCNC: 2 MG/DL (ref 1.6–2.6)
OSMOLALITY SERPL CALC.SUM OF ELEC: 286 MOSM/KG (ref 275–295)
P AXIS: 57 DEGREES
P-R INTERVAL: 134 MS
PHOSPHATE SERPL-MCNC: 1.2 MG/DL (ref 2.4–5.1)
POTASSIUM SERPL-SCNC: 3.4 MMOL/L (ref 3.5–5.1)
POTASSIUM SERPL-SCNC: 3.4 MMOL/L (ref 3.5–5.1)
PROT SERPL-MCNC: 6.6 G/DL (ref 5.7–8.2)
Q-T INTERVAL: 368 MS
QRS DURATION: 84 MS
QTC CALCULATION (BEZET): 477 MS
R AXIS: 71 DEGREES
SODIUM SERPL-SCNC: 139 MMOL/L (ref 136–145)
T AXIS: 12 DEGREES
VENTRICULAR RATE: 101 BPM

## 2024-04-28 PROCEDURE — 99233 SBSQ HOSP IP/OBS HIGH 50: CPT | Performed by: HOSPITALIST

## 2024-04-28 RX ORDER — MELATONIN
3 NIGHTLY
Status: DISCONTINUED | OUTPATIENT
Start: 2024-04-28 | End: 2024-04-29

## 2024-04-28 RX ORDER — ZOLPIDEM TARTRATE 5 MG/1
5 TABLET ORAL NIGHTLY PRN
Status: DISCONTINUED | OUTPATIENT
Start: 2024-04-28 | End: 2024-04-29

## 2024-04-28 NOTE — H&P
[facility]    PATIENT'S NAME: TREVON SIMON   ATTENDING PHYSICIAN: Vickie Rivera MD   PATIENT ACCOUNT#:   [id number]    LOCATION:  [address] [place] [address]  MEDICAL RECORD #:   [id number]       YOB: 1980  ADMISSION DATE:       04/27/2024    HISTORY AND PHYSICAL EXAMINATION    CHIEF COMPLAINT:  Alcohol abuse, seizure-like activity.    HISTORY OF PRESENT ILLNESS:  Patient is a 44-year-old male with past medical history of seizure disorder, who had come to the hospital for a seizure evaluation.  History has been obtained from patient's family and fiancee, that he had 2 generalized tonic-clonic seizures, which lasted about 2 minutes.  The patient did have initially a brain infection a few years ago and was admitted with seizures at that time.  Patient continues to drink half a pint of alcohol daily, last drink was about 3 days ago.  Currently denies any chest pain, shortness of breath, palpitation, nausea, vomiting, diarrhea.      PAST MEDICAL HISTORY:  Positive for seizure disorder.    MEDICATIONS:  Home medications have been reviewed and reconciled.  Please refer to patient's chart for a detailed review regarding patient's home medications.    ALLERGIES:  Penicillin.    SOCIAL HISTORY:  Patient is single.  Smokes daily cigarettes.  Denies any illicit drug use.  Does have significant alcohol use.    REVIEW OF SYSTEMS:  A 10-point review of systems has been obtained and is otherwise negative.      PHYSICAL EXAMINATION:    GENERAL:  Patient is lying in bed, appears to be in mild to moderate distress at this time.  He is alert, answering questions.  VITAL SIGNS:  Patient's blood pressure is 120/91, pulse is 106, respirations are 18, temperature is 98.8, saturation 97% on room air.  HEENT:  Extraocular movements are intact.  Pupils equal, round, reactive to light and accommodation.  Atraumatic, normocephalic.  LUNGS:  Good air entry bilaterally.  HEART:  S1, S2 appreciated.  ABDOMEN:  Soft,  nontender, nondistended.  Positive bowel sounds.  EXTREMITIES:  Peripheral pulses are positive.  NEUROLOGIC:  Patient does have no focal deficits currently.  Gait is intact.     LABORATORY DATA:  Glucose is 157, sodium is 136, potassium is 2.9, chloride is 97, carbon dioxide is 21, BUN is 10, creatinine is 0.87.  Alkaline phosphatase is 160, , ALT is 79, total bilirubin is 3.0, globulin is 3.2.  Magnesium is 1.4.  Lipase is 78.  WBC is 6.4, hemoglobin is 13.5, hematocrit is 36.8, platelets are 69.  Ethanol level is less than 3.       Patient's CT head has been negative.  Patient is found to have stable encephalomalacia at the right frontal lobe.    IMAGING:  As above.    ASSESSMENT AND PLAN:  Patient is a 44-year-old male with past medical history of alcohol abuse and seizure disorder, who has been admitted to the hospital with alcohol withdrawal seizures.    1.   Generalized tonic-clonic seizures.  At this time, patient will be admitted to the hospital, started on CIWA protocol.  Neurology has been placed on consult.  We will appreciate recommendations.  We will continue to monitor the patient closely.  2.   Severe hypokalemia.  We will aggressively replete.  Initial electrolyte replacement protocol.  Repeat labs in a.m. and recheck.  3.   Hypomagnesemia.  We will replete as well.  4.   Alcohol abuse.  As above.  5.   Thrombocytopenia, likely in the setting of chronic alcohol use.  6.   Transaminitis, likely due to above as well.  7.   VTE prophylaxis.  We will hold pharmacologic VTE prophylaxis in the setting of thrombocytopenia.  8.   Disposition:  At this time, we will continue to monitor the patient closely.  Patient is a Full Code.  Further recommendations to follow.    Greater than 75 minutes spent, with greater than 50% of the time spent face-to-face.    Dictated By Vickie Rivera MD  d: 04/27/2024 14:55:55  t: 04/28/2024 00:15:47  [facility] [id number]  [facility]/

## 2024-04-28 NOTE — PLAN OF CARE
Problem: Patient Centered Care  Goal: Patient preferences are identified and integrated in the patient's plan of care  Description: Interventions:  - What would you like us to know as we care for you?   - Provide timely, complete, and accurate information to /family  - Incorporate patient and family knowledge, values, beliefs, and cultural backgrounds into the planning and delivery of care  - Encourage patient/family to participate in care and decision-making at the level they choose  - Honor patient and family perspectives and choices  Outcome: Progressing     Problem: DRUG ABUSE/DETOX  Goal: Will have no detox symptoms and will verbalize plan for changing drug-related behavior  Description: INTERVENTIONS:  - Administer medication as ordered  - Monitor physical status  - Provide emotional support with 1:1 interaction with staff  - Encourage 12-Step involvement or recovery focused alternative  Outcome: Progressing     Problem: NEUROLOGICAL - ADULT  Goal: Absence of seizures  Description: INTERVENTIONS  - Monitor for seizure activity  - Administer anti-seizure medications as ordered  - Monitor neurological status  Outcome: Progressing

## 2024-04-28 NOTE — PROGRESS NOTES
[facility]  part of [facility]    Progress Note    Rodolfo Valdez Patient Status:  Inpatient    1980 MRN [id number]   Location [facility] 5SW/SE Attending Vickie Rivera MD   Hosp Day # 1 PCP Jeff Anthony D'Amico, DO     Chief Complaint:     Generalized tonic-clonic seizure    Subjective:   Subjective:    Patient seen and examined this morning  Slightly drowsy but overall states he is feeling better  Family at bedside  Tachycardic afebrile    Objective:   Blood pressure (!) 136/100, pulse 101, temperature 98.8 °F (37.1 °C), temperature source Oral, resp. rate 18, height 5' 11\" (1.803 m), weight 172 lb 6.4 oz (78.2 kg), SpO2 99%.  Physical Exam    General: Patient is alert and oriented x3 does not appear to be in acute distress at this time  HEENT: EOMI PERRLA, atraumatic normocephalic  Cardiac: S1-S2 appreciated  Lungs: Good air entry bilaterally clear to auscultation  Abdomen: Soft nontender nondistended positive bowel sounds  Ext: Peripheral pulses are positive  Neuro: No focal deficits noted  Psych: Normal mood  Skin: No rashes noted  MSK: Full range of motion intact      Results:   Lab Results   Component Value Date    WBC 6.4 2024    HGB 13.5 2024    HCT 36.8 (L) 2024    PLT 69.0 (L) 2024    CREATSERUM 0.64 (L) 2024    BUN 6 (L) 2024     2024    K 3.4 (L) 2024    K 3.4 (L) 2024     2024    CO2 27.0 2024     (H) 2024    CA 9.0 2024    ALB 3.9 2024    ALKPHO 141 (H) 2024    BILT 2.2 (H) 2024    TP 6.6 2024     (H) 2024    ALT 97 (H) 2024    PTT 26.5 2024    INR 1.04 2024    T4F 1.0 2024    TSH 5.570 (H) 2024    WINDY 60 2023    LIP 78 (H) 2024    ESRML 80 (H) 2023    CRP 3.74 (H) 2023    MG 2.0 2024    MG 2.0 2024    PHOS 1.2 (L) 2024    B12 538 2024    ETOH  <3 04/27/2024       CT BRAIN OR HEAD (42325)    Result Date: 4/27/2024  PROCEDURE: CT BRAIN OR HEAD (CPT=70450)  COMPARISON: [facility], CT BRAIN OR HEAD (CPT=70450), 3/03/2024, 0:05 AM.  INDICATIONS: sz like activity x2, etoh abuse, last drink 48 hrs ago.  TECHNIQUE: CT images were obtained without contrast material.  Automated exposure control for dose reduction was used.  Dose information is transmitted to the ACR (American College of Radiology) NRDR (National Radiology Data Registry) which includes the Dose Index Registry.  Findings and impression:  No skull fracture  Normal midline ventricles  Stable encephalomalacia right frontal lobe  No hemorrhage or mass effect or edema     Dictated by (CST): Virgil Rhodes MD on 4/27/2024 at 11:40 AM     Finalized by (CST): iVrgil Rhodes MD on 4/27/2024 at 11:41 AM         EKG 12 Lead    Result Date: 4/28/2024  Sinus tachycardia Cannot rule out Anterior infarct , age undetermined Abnormal ECG When compared with ECG of 04-MAR-2024 13:51, T wave amplitude has decreased in Anterior-lateral leads Confirmed by VY LE, YASH ([id number]) on 4/28/2024 9:35:48 AM     Assessment & Plan:       Generalized tonic-clonic seizures.    - patient will be admitted to the hospital, started on CIWA protocol.    -Neurology has been placed on consult.    -We will appreciate recommendations.    -We will continue to monitor the patient closely.    Severe hypokalemia.   - We will aggressively replete.  Initial electrolyte replacement protocol.    -Repeat labs in a.m. and recheck.    Hypomagnesemia.  We will replete as well.  Alcohol abuse.  As above.  Thrombocytopenia, likely in the setting of chronic alcohol use.  Transaminitis, likely due to above as well.    VTE prophylaxis.  We will hold pharmacologic VTE prophylaxis in the setting of thrombocytopenia.    Disposition:  At this time, we will continue to monitor the patient closely.  Patient is a Full Code.  Further  recommendations to follow.    Global A/P  -hypophosphatemia - replete  -transaminitis - worsening - will continue to monitor.   - recent Liver US 03/03/2024 - had sludge at that time.   -Reviewed previous consultant notes  -Reviewed CBC, BMP, Mag, and Phos  -Reviewed tests ordered  -Repeat labs in am  -MDM: High, severe exacerbation of chronic illness posing a threat to life. IV medications requiring close inpatient monitoring.         Vickie Rivera MD

## 2024-04-28 NOTE — PLAN OF CARE
Problem: Patient Centered Care  Goal: Patient preferences are identified and integrated in the patient's plan of care  Description: Interventions:  - What would you like us to know as we care for you? HX of seizures   - Provide timely, complete, and accurate information to patient/family  - Incorporate patient and family knowledge, values, beliefs, and cultural backgrounds into the planning and delivery of care  - Encourage patient/family to participate in care and decision-making at the level they choose  - Honor patient and family perspectives and choices  Outcome: Progressing     Problem: DRUG ABUSE/DETOX  Goal: Will have no detox symptoms and will verbalize plan for changing drug-related behavior  Description: INTERVENTIONS:  - Administer medication as ordered  - Monitor physical status  - Provide emotional support with 1:1 interaction with staff  - Encourage 12-Step involvement or recovery focused alternative  Outcome: Progressing     Problem: NEUROLOGICAL - ADULT  Goal: Absence of seizures  Description: INTERVENTIONS  - Monitor for seizure activity  - Administer anti-seizure medications as ordered  - Monitor neurological status  Outcome: Progressing  Goal: Achieves stable or improved neurological status  Description: INTERVENTIONS  - Assess for and report changes in neurological status  - Initiate measures to prevent increased intracranial pressure  - Maintain blood pressure and fluid volume within ordered parameters to optimize cerebral perfusion and minimize risk of hemorrhage  - Monitor temperature, glucose, and sodium. Initiate appropriate interventions as ordered  Outcome: Progressing  Goal: Remains free of injury related to seizure activity  Description: INTERVENTIONS:  - Maintain airway, patient safety  and administer oxygen as ordered  - Monitor patient for seizure activity, document and report duration and description of seizure to MD/LIP  - If seizure occurs, turn patient to side and suction  secretions as needed  - Reorient patient post seizure  - Seizure pads on all 4 side rails  - Instruct patient/family to notify RN of any seizure activity  - Instruct patient/family to call for assistance with activity based on assessment  Outcome: Progressing  Goal: Achieves maximal functionality and self care  Description: INTERVENTIONS  - Monitor swallowing and airway patency with patient fatigue and changes in neurological status  - Encourage and assist patient to increase activity and self care with guidance from PT/OT  - Encourage visually impaired, hearing impaired and aphasic patients to use assistive/communication devices  Outcome: Progressing     Problem: SAFETY ADULT - FALL  Goal: Free from fall injury  Description: INTERVENTIONS:  - Assess pt frequently for physical needs  - Identify cognitive and physical deficits and behaviors that affect risk of falls.  - [place] fall precautions as indicated by assessment.  - Educate pt/family on patient safety including physical limitations  - Instruct pt to call for assistance with activity based on assessment  - Modify environment to reduce risk of injury  - Provide assistive devices as appropriate  - Consider OT/PT consult to assist with strengthening/mobility  - Encourage toileting schedule  Outcome: Progressing   Patient alert and oriented x 4 , CIWA protocol, neurology on consult, seizure and fall precautions in place, electrolytes replacement per protocol, mely light within reach , frequent rounding by nursing staff

## 2024-04-29 VITALS
HEART RATE: 98 BPM | WEIGHT: 172.38 LBS | DIASTOLIC BLOOD PRESSURE: 92 MMHG | BODY MASS INDEX: 24.13 KG/M2 | OXYGEN SATURATION: 97 % | TEMPERATURE: 99 F | SYSTOLIC BLOOD PRESSURE: 135 MMHG | HEIGHT: 71 IN | RESPIRATION RATE: 18 BRPM

## 2024-04-29 LAB
ALBUMIN SERPL-MCNC: 4.3 G/DL (ref 3.2–4.8)
ALBUMIN/GLOB SERPL: 1.4 {RATIO} (ref 1–2)
ALP LIVER SERPL-CCNC: 135 U/L
ALT SERPL-CCNC: 93 U/L
ANION GAP SERPL CALC-SCNC: 6 MMOL/L (ref 0–18)
AST SERPL-CCNC: 198 U/L (ref ?–34)
BASOPHILS # BLD AUTO: 0.01 X10(3) UL (ref 0–0.2)
BASOPHILS NFR BLD AUTO: 0.2 %
BILIRUB SERPL-MCNC: 1.4 MG/DL (ref 0.3–1.2)
BUN BLD-MCNC: <5 MG/DL (ref 9–23)
CALCIUM BLD-MCNC: 9.6 MG/DL (ref 8.7–10.4)
CHLORIDE SERPL-SCNC: 107 MMOL/L (ref 98–112)
CO2 SERPL-SCNC: 23 MMOL/L (ref 21–32)
CREAT BLD-MCNC: 0.66 MG/DL
DEPRECATED RDW RBC AUTO: 53.6 FL (ref 35.1–46.3)
EGFRCR SERPLBLD CKD-EPI 2021: 119 ML/MIN/1.73M2 (ref 60–?)
EOSINOPHIL # BLD AUTO: 0.09 X10(3) UL (ref 0–0.7)
EOSINOPHIL NFR BLD AUTO: 1.4 %
ERYTHROCYTE [DISTWIDTH] IN BLOOD BY AUTOMATED COUNT: 16.4 % (ref 11–15)
GLOBULIN PLAS-MCNC: 3 G/DL (ref 2.8–4.4)
GLUCOSE BLD-MCNC: 99 MG/DL (ref 70–99)
HCT VFR BLD AUTO: 35.6 %
HGB BLD-MCNC: 12.5 G/DL
IMM GRANULOCYTES # BLD AUTO: 0.02 X10(3) UL (ref 0–1)
IMM GRANULOCYTES NFR BLD: 0.3 %
LYMPHOCYTES # BLD AUTO: 2.47 X10(3) UL (ref 1–4)
LYMPHOCYTES NFR BLD AUTO: 39.8 %
MAGNESIUM SERPL-MCNC: 1.8 MG/DL (ref 1.6–2.6)
MCH RBC QN AUTO: 31.8 PG (ref 26–34)
MCHC RBC AUTO-ENTMCNC: 35.1 G/DL (ref 31–37)
MCV RBC AUTO: 90.6 FL
MONOCYTES # BLD AUTO: 0.61 X10(3) UL (ref 0.1–1)
MONOCYTES NFR BLD AUTO: 9.8 %
NEUTROPHILS # BLD AUTO: 3.01 X10 (3) UL (ref 1.5–7.7)
NEUTROPHILS # BLD AUTO: 3.01 X10(3) UL (ref 1.5–7.7)
NEUTROPHILS NFR BLD AUTO: 48.5 %
PHOSPHATE SERPL-MCNC: 2.8 MG/DL (ref 2.4–5.1)
PHOSPHATE SERPL-MCNC: 2.8 MG/DL (ref 2.4–5.1)
PLATELET # BLD AUTO: 97 10(3)UL (ref 150–450)
PLATELETS.RETICULATED NFR BLD AUTO: 16.4 % (ref 0–7)
POTASSIUM SERPL-SCNC: 3.6 MMOL/L (ref 3.5–5.1)
POTASSIUM SERPL-SCNC: 3.6 MMOL/L (ref 3.5–5.1)
PROT SERPL-MCNC: 7.3 G/DL (ref 5.7–8.2)
RBC # BLD AUTO: 3.93 X10(6)UL
SODIUM SERPL-SCNC: 136 MMOL/L (ref 136–145)
WBC # BLD AUTO: 6.2 X10(3) UL (ref 4–11)

## 2024-04-29 PROCEDURE — 99239 HOSP IP/OBS DSCHRG MGMT >30: CPT | Performed by: HOSPITALIST

## 2024-04-29 RX ORDER — FOLIC ACID 1 MG/1
1 TABLET ORAL DAILY
Qty: 30 TABLET | Refills: 0 | Status: SHIPPED | OUTPATIENT
Start: 2024-04-30

## 2024-04-29 RX ORDER — HYDROCODONE BITARTRATE AND ACETAMINOPHEN 5; 325 MG/1; MG/1
1 TABLET ORAL EVERY 6 HOURS PRN
Qty: 15 TABLET | Refills: 0 | Status: SHIPPED | OUTPATIENT
Start: 2024-04-29

## 2024-04-29 RX ORDER — LEVETIRACETAM 500 MG/1
500 TABLET ORAL 2 TIMES DAILY
Qty: 60 TABLET | Refills: 0 | Status: SHIPPED | OUTPATIENT
Start: 2024-04-29

## 2024-04-29 RX ORDER — MELATONIN
100 3 TIMES DAILY
Qty: 30 TABLET | Refills: 0 | Status: SHIPPED | OUTPATIENT
Start: 2024-04-29

## 2024-04-29 RX ORDER — POTASSIUM CHLORIDE 20 MEQ/1
40 TABLET, EXTENDED RELEASE ORAL EVERY 4 HOURS
Status: DISCONTINUED | OUTPATIENT
Start: 2024-04-29 | End: 2024-04-29

## 2024-04-29 NOTE — PROGRESS NOTES
Patient is currently inpatient for suspected Alcohol Use Disorder by the Mental Health Liaison.  met with the patient at bedside for the purpose of introduction assessment and to provide information on Alcohol Use Disorder services. Patient reports being a 44 year old  male. The patient reports his primary drug of choice being alcohol, starting at age 20. Patient reports drinking alcohol daily in the amount of 2 pints daily for 14 years. Patient admits to stopping drinking for 7 days having withdrawals that cause him to have a seizure. The patient reports his last drink being 4/19. Patient reports no medical or psych issues currently. Patient reports smoking cannabis since the age of 15 and reports smoking 1 blunt 2x per week. Patient reported the longest time of sobriety was 5  months due to his job. Patient denies any suicidal /homicidal ideation.     Patient does meet ASAM criteria and DSM V Criteria for Alcohol Use Disorder.     Patient reports he is done with drinking be cause it is not counterproductive and he is 'so over it'.     Patient signed [facility] Consent form. Patient was given a referral list of services that can assist him. Patient will be discharged home today 4/29/24 where he can return to get his life on the right track.    Rosa Elena Robles    [place] [facility]   [address] [place] [address]

## 2024-04-29 NOTE — PROGRESS NOTES
Discharge order placed by Dr. Rivera, pt requesting peripheral IV and telemetry to be removed immediately upon entrance of this RN into pt's room.  Tried to explain to pt importance of leaving IV access in because of seizure precautions.  PT became angry and responded, \"I am a grown man and know what the consequences are\", and asking for IV to be removed.  Peripheral IV and tele removed per patient request.

## 2024-04-29 NOTE — DISCHARGE SUMMARY
[facility]  part of [facility]     Discharge Summary    Rodolfo Valdez Patient Status:  Inpatient    1980 MRN [id number]   Location [facility] 5SW/SE Attending Vickie Rivera MD   Hosp Day # 2 PCP Jeff Anthony D'Amico, DO     Date of Admission: 2024    Date of Discharge: 2024    Admitting Diagnosis: Hypokalemia [E87.6]  Hypomagnesemia [E83.42]  ETOH abuse [F10.10]  Generalized tonic-clonic seizure (HCC) [G40.409]    Discharge Diagnosis:   Patient Active Problem List   Diagnosis    Severe sepsis (HCC)    Community acquired pneumonia of left lower lobe of lung    GUILLERMINA (acute kidney injury) (HCC)    Hyponatremia    Focal motor seizure (HCC)    Subdural empyema (HCC)    Syncope and collapse    Confusion and disorientation    Hypokalemia    Hypomagnesemia    Generalized tonic-clonic seizure (HCC)    ETOH abuse    Neuropathy, alcoholic (HCC)       Reason for Admission:     Generalized Tonic-clonic Seizure    Physical Exam:     General: Patient is alert and oriented x3 does not appear to be in acute distress at this time  HEENT: EOMI PERRLA, atraumatic normocephalic  Cardiac: S1-S2 appreciated  Lungs: Good air entry bilaterally clear to auscultation  Abdomen: Soft nontender nondistended positive bowel sounds  Ext: Peripheral pulses are positive  Neuro: No focal deficits noted  Psych: Normal mood  Skin: No rashes noted  MSK: Full range of motion intact      Hospital Course:     Generalized tonic-clonic seizures.    - patient will be admitted to the hospital, started on CIWA protocol.    -Neurology has been placed on consult.    -We will appreciate recommendations.    -We will continue to monitor the patient closely.     Severe hypokalemia.   - We will aggressively replete.  Initial electrolyte replacement protocol.    -Repeat labs in a.m. and recheck.     Hypomagnesemia.  We will replete as well.  Alcohol abuse.  As above.  Thrombocytopenia, likely in the setting of  chronic alcohol use.  Transaminitis, likely due to above as well.     VTE prophylaxis.  We will hold pharmacologic VTE prophylaxis in the setting of thrombocytopenia.     Disposition:  At this time, we will continue to monitor the patient closely.  Patient is a Full Code.  Further recommendations to follow.     Global A/P  -hypophosphatemia - replete  -transaminitis - worsening - will continue to monitor.                 - recent Liver US 03/03/2024 - had sludge at that time.     History of Present Illness:     Patient is a 44-year-old male with past medical history of seizure disorder, who had come to the hospital for a seizure evaluation. History has been obtained from patient's family and fiancee, that he had 2 generalized tonic-clonic seizures, which lasted about 2 minutes. The patient did have initially a brain infection a few years ago and was admitted with seizures at that time. Patient continues to drink half a pint of alcohol daily, last drink was about 3 days ago. Currently denies any chest pain, shortness of breath, palpitation, nausea, vomiting, diarrhea.     Disposition: Home or Self Care    Discharge Condition: Stable    Discharge Medications:   Current Discharge Medication List        START taking these medications    Details   cyanocobalamin 1000 MCG Oral Tab Take 1 tablet (1,000 mcg total) by mouth daily.  Qty: 30 tablet, Refills: 0      folic acid 1 MG Oral Tab Take 1 tablet (1 mg total) by mouth daily.  Qty: 30 tablet, Refills: 0      HYDROcodone-acetaminophen 5-325 MG Oral Tab Take 1 tablet by mouth every 6 (six) hours as needed.  Qty: 15 tablet, Refills: 0    Associated Diagnoses: Neuropathy, alcoholic (HCC)      thiamine 100 MG Oral Tab Take 1 tablet (100 mg total) by mouth in the morning, at noon, and at bedtime.  Qty: 30 tablet, Refills: 0           CONTINUE these medications which have CHANGED    Details   lidocaine-menthol 4-1 % External Patch Place 1 patch onto the skin daily.  Qty: 15  patch, Refills: 0             Total dc time > 30 min    Vickie Rivera MD  4/29/2024  11:12 AM     Hospital Discharge Diagnoses:  Generalized weakness    Lace+ Score: 49  59-90 High Risk  29-58 Medium Risk  0-28   Low Risk.    TCM Follow-Up Recommendation:  LACE 29-58: Moderate Risk of readmission after discharge from the hospital.

## 2024-04-29 NOTE — PLAN OF CARE
Problem: Patient Centered Care  Goal: Patient preferences are identified and integrated in the patient's plan of care  Description: Interventions:  - What would you like us to know as we care for you? From home with girlfriend   - Provide timely, complete, and accurate information to patient/family  - Incorporate patient and family knowledge, values, beliefs, and cultural backgrounds into the planning and delivery of care  - Encourage patient/family to participate in care and decision-making at the level they choose  - Honor patient and family perspectives and choices  Outcome: Progressing     Problem: DRUG ABUSE/DETOX  Goal: Will have no detox symptoms and will verbalize plan for changing drug-related behavior  Description: INTERVENTIONS:  - Administer medication as ordered  - Monitor physical status  - Provide emotional support with 1:1 interaction with staff  - Encourage 12-Step involvement or recovery focused alternative  Outcome: Progressing     Problem: NEUROLOGICAL - ADULT  Goal: Absence of seizures  Description: INTERVENTIONS  - Monitor for seizure activity  - Administer anti-seizure medications as ordered  - Monitor neurological status  Outcome: Progressing  Goal: Achieves stable or improved neurological status  Description: INTERVENTIONS  - Assess for and report changes in neurological status  - Initiate measures to prevent increased intracranial pressure  - Maintain blood pressure and fluid volume within ordered parameters to optimize cerebral perfusion and minimize risk of hemorrhage  - Monitor temperature, glucose, and sodium. Initiate appropriate interventions as ordered  Outcome: Progressing  Goal: Remains free of injury related to seizure activity  Description: INTERVENTIONS:  - Maintain airway, patient safety  and administer oxygen as ordered  - Monitor patient for seizure activity, document and report duration and description of seizure to MD/LIP  - If seizure occurs, turn patient to side and  suction secretions as needed  - Reorient patient post seizure  - Seizure pads on all 4 side rails  - Instruct patient/family to notify RN of any seizure activity  - Instruct patient/family to call for assistance with activity based on assessment  Outcome: Progressing  Goal: Achieves maximal functionality and self care  Description: INTERVENTIONS  - Monitor swallowing and airway patency with patient fatigue and changes in neurological status  - Encourage and assist patient to increase activity and self care with guidance from PT/OT  - Encourage visually impaired, hearing impaired and aphasic patients to use assistive/communication devices  Outcome: Progressing     Problem: SAFETY ADULT - FALL  Goal: Free from fall injury  Description: INTERVENTIONS:  - Assess pt frequently for physical needs  - Identify cognitive and physical deficits and behaviors that affect risk of falls.  - [place] fall precautions as indicated by assessment.  - Educate pt/family on patient safety including physical limitations  - Instruct pt to call for assistance with activity based on assessment  - Modify environment to reduce risk of injury  - Provide assistive devices as appropriate  - Consider OT/PT consult to assist with strengthening/mobility  - Encourage toileting schedule  Outcome: Progressing   CIWA discontinue per md order, close monitoring for S/S of alcohol withdrawal patient refusing bed alarm,  seizure precautions in place , call light within reach

## 2024-04-29 NOTE — PLAN OF CARE
Problem: Patient Centered Care  Goal: Patient preferences are identified and integrated in the patient's plan of care  Description: Interventions:  - What would you like us to know as we care for you?   - Provide timely, complete, and accurate information to patient/family  - Incorporate patient and family knowledge, values, beliefs, and cultural backgrounds into the planning and delivery of care  - Encourage patient/family to participate in care and decision-making at the level they choose  - Honor patient and family perspectives and choices  Outcome: Progressing     Problem: DRUG ABUSE/DETOX  Goal: Will have no detox symptoms and will verbalize plan for changing drug-related behavior  Description: INTERVENTIONS:  - Administer medication as ordered  - Monitor physical status  - Provide emotional support with 1:1 interaction with staff  - Encourage 12-Step involvement or recovery focused alternative  Outcome: Progressing     Problem: NEUROLOGICAL - ADULT  Goal: Absence of seizures  Description: INTERVENTIONS  - Monitor for seizure activity  - Administer anti-seizure medications as ordered  - Monitor neurological status  Outcome: Progressing  Goal: Achieves stable or improved neurological status  Description: INTERVENTIONS  - Assess for and report changes in neurological status  - Initiate measures to prevent increased intracranial pressure  - Maintain blood pressure and fluid volume within ordered parameters to optimize cerebral perfusion and minimize risk of hemorrhage  - Monitor temperature, glucose, and sodium. Initiate appropriate interventions as ordered  Outcome: Progressing  Goal: Remains free of injury related to seizure activity  Description: INTERVENTIONS:  - Maintain airway, patient safety  and administer oxygen as ordered  - Monitor patient for seizure activity, document and report duration and description of seizure to MD/LIP  - If seizure occurs, turn patient to side and suction secretions as  needed  - Reorient patient post seizure  - Seizure pads on all 4 side rails  - Instruct patient/family to notify RN of any seizure activity  - Instruct patient/family to call for assistance with activity based on assessment  Outcome: Progressing  Goal: Achieves maximal functionality and self care  Description: INTERVENTIONS  - Monitor swallowing and airway patency with patient fatigue and changes in neurological status  - Encourage and assist patient to increase activity and self care with guidance from PT/OT  - Encourage visually impaired, hearing impaired and aphasic patients to use assistive/communication devices  Outcome: Progressing     Problem: SAFETY ADULT - FALL  Goal: Free from fall injury  Description: INTERVENTIONS:  - Assess pt frequently for physical needs  - Identify cognitive and physical deficits and behaviors that affect risk of falls.  - [place] fall precautions as indicated by assessment.  - Educate pt/family on patient safety including physical limitations  - Instruct pt to call for assistance with activity based on assessment  - Modify environment to reduce risk of injury  - Provide assistive devices as appropriate  - Consider OT/PT consult to assist with strengthening/mobility  - Encourage toileting schedule  Outcome: Progressing

## 2024-04-29 NOTE — PAYOR COMM NOTE
--------------  ADMISSION REVIEW   4/27-4/28  Payor: [facility]  Subscriber #:  [id number]  Authorization Number: [id number]    Admit date: 4/27/24  Admit time:  1:31 PM       REVIEW DOCUMENTATION:  ED Provider Notes signed by Shon Nelson MD at 4/27/2024 12:07 PM    Patient Seen in: [facility] Emergency Department    History     Chief Complaint   Patient presents with    Seizure Disorder     Stated Complaint: sz like activity x2, etoh abuse, last drink 48 hrs ago.    Subjective:   HPI    44-year-old male presents via EMS for evaluation for seizure.  EMS reports that fiancé reported that he had 2 generalized tonic-clonic seizures.  Each lasted about 2 minutes.  EMS reports that patient had a brain infection a few years ago and was admitted and had seizures at that time.  Patient reports that he drinks half a pint of alcohol daily and his last drink was about 3 days ago.  He states that he just did not feel like drinking.  He denies any headache, chest pain, shortness of breath, nausea, vomiting, abdominal pain, focal neurologic symptoms.    Objective:   Past Medical History:    Seizure disorder (HCC)   Positive for stated complaint: sz like activity x2, etoh abuse, last drink 48 hrs ago.  Other systems are as noted in HPI.  Constitutional and vital signs reviewed.      All other systems reviewed and negative except as noted above.    Physical Exam     ED Triage Vitals   BP 04/27/24 1100 (!) 120/91   Pulse 04/27/24 1100 106   Resp 04/27/24 1103 18   Temp 04/27/24 1103 98.8 °F (37.1 °C)   Temp src 04/27/24 1103 Oral   SpO2 04/27/24 1103 97 %   O2 Device 04/27/24 1103 None (Room air)       Current:BP (!) 120/91   Pulse 107   Temp 98.8 °F (37.1 °C) (Oral)   Resp 18   Ht 180.3 cm (5' 11\")   Wt 79.4 kg   SpO2 97%   BMI 24.41 kg/m²     Physical Exam  Vitals and nursing note reviewed.   Constitutional:       Appearance: Normal appearance.   HENT:      Head:  Normocephalic. Abrasion present.      Mouth/Throat:      Mouth: Oral lesions (abrasions to tongue bilaterally) present.   Eyes:      General: Lids are normal.      Extraocular Movements: Extraocular movements intact.      Pupils: Pupils are equal, round, and reactive to light.   Cardiovascular:      Rate and Rhythm: Normal rate and regular rhythm.      Pulses: Normal pulses.   Pulmonary:      Effort: Pulmonary effort is normal.      Breath sounds: Normal breath sounds.   Musculoskeletal:         General: Normal range of motion.        Arms:       Cervical back: Full passive range of motion without pain and normal range of motion. No pain with movement, spinous process tenderness or muscular tenderness. Normal range of motion.   Skin:     General: Skin is warm and dry.   Neurological:      General: No focal deficit present.      Mental Status: He is alert.      Cranial Nerves: No cranial nerve deficit, dysarthria or facial asymmetry.      Sensory: Sensation is intact.      Motor: Motor function is intact.      Coordination: Coordination is intact.      Gait: Gait is intact.     Labs Reviewed   COMP METABOLIC PANEL (14) - Abnormal; Notable for the following components:       Result Value    Glucose 157 (*)     Potassium 2.9 (*)     Chloride 97 (*)     ALT 79 (*)      (*)     Alkaline Phosphatase 160 (*)     Bilirubin, Total 3.0 (*)     All other components within normal limits   MAGNESIUM - Abnormal; Notable for the following components:    Magnesium 1.4 (*)     All other components within normal limits   LIPASE - Abnormal; Notable for the following components:    Lipase 78 (*)     All other components within normal limits   RBC MORPHOLOGY SCAN - Abnormal; Notable for the following components:    RBC Morphology See morphology below (*)     Platelet Morphology See morphology below (*)     Giant platelets Few (*)     All other components within normal limits   POCT GLUCOSE - Abnormal; Notable for the following  components:    POC Glucose  178 (*)     All other components within normal limits   CBC W/ DIFFERENTIAL - Abnormal; Notable for the following components:    RBC 4.22 (*)     HCT 36.8 (*)     RDW-SD 51.3 (*)     RDW 16.2 (*)     PLT 69.0 (*)     Immature Platelet Fraction 20.3 (*)     All other components within normal limits   PROTHROMBIN TIME (PT) - Normal   PTT, ACTIVATED - Normal   ETHYL ALCOHOL - Normal   CBC WITH DIFFERENTIAL WITH PLATELET    Narrative:     The following orders were created for panel order CBC With Differential With Platelet.  Procedure                               Abnormality         Status                     ---------                               -----------         ------                     CBC W/ DIFFERENTIAL[[id number]]          Abnormal            Final result                 Please view results for these tests on the individual orders.   SCAN SLIDE   DRUG ABUSE PANEL 11 SCREEN     EKG    Rate, intervals and axes as noted on EKG Report.  Rate: 101  Rhythm: Sinus Rhythm  Reading: no stemi, interpreted by myself.  ED Course as of 04/27/24 1207  ------------------------------------------------------------  Time: 04/27 1143  Value: CT BRAIN OR HEAD ([id number])  Comment: Per my independent interpretation, patient's CT Head demonstrates no intracranial hemorrhage.    Admission disposition: 4/27/2024 12:03 PM      Medical Decision Making  Differential diagnosis includes but is not limited to alcohol withdrawal seizure, seizure disorder, electrolyte disturbance, intracranial injury, etc.    On arrival patient was postictal but quickly returned to normal mental status.  Patient did have findings on exam consistent with a generalized tonic-clonic seizure as reported.  Head CT was negative.  Patient's labs show thrombocytopenia, hypokalemia, hypomagnesemia, and transaminitis all consistent with alcohol abuse.  Case discussed with neurology who recommends starting him on Keppra.  Patient will be  admitted for electrolyte replacement and evaluation for seizures. CIWA is 3.    Rhythm Strip: Rate 100 sinus The cardiac monitor was ordered secondary to the patient's electrolyte abnormality and seizures.     Complicating factors: The patient  has a past medical history of Seizure disorder (HCC). and  has no past surgical history on file. that contribute to the medical complexity of this ED evaluation.     Medical Record Review: I personally reviewed available prior medical records for any recent pertinent discharge summaries, testing, and procedures, and reviewed those reports.      Problems Addressed:  ETOH abuse: chronic illness or injury with severe exacerbation, progression, or side effects of treatment  Generalized tonic-clonic seizure (HCC): acute illness or injury with systemic symptoms  Hypokalemia: acute illness or injury with systemic symptoms  Hypomagnesemia: acute illness or injury with systemic symptoms    Amount and/or Complexity of Data Reviewed  Independent Historian: EMS     Details: As per HPI  External Data Reviewed: notes.     Details: Patient's past hospital admissions reviewed.  March 2 March 4 of this year patient was admitted for syncope, hypokalemia and hypomagnesemia.  In January 2023 patient was admitted from January 10 January 21 with pneumonia, sepsis and empyema secondary to strep pneumonia.  Labs: ordered. Decision-making details documented in ED Course.  Radiology: ordered and independent interpretation performed. Decision-making details documented in ED Course.  ECG/medicine tests: ordered and independent interpretation performed. Decision-making details documented in ED Course.  Discussion of management or test interpretation with external provider(s): Case discussed with Dr. Sunshine with neuro who recs starting keppra 1g IV as this may be epilepsy as opposed to alcohol withdrawal.  Dr. Montanez accepts admission under Dr. SUDHA Rivera.    Risk  Decision regarding  hospitalization.    Critical Care  Total time providing critical care: minutes (48 minutes including time spent examining and re-evaluating the patient, ordering and reviewing laboratory tests, documenting, reviewing previous records, obtaining information from the family, and speaking with consultants, admitting doctors, nurses and medics and excludes any time spent on procedures.  )  sposition and Plan     Clinical Impression:  1. Generalized tonic-clonic seizure (HCC)    2. Hypokalemia    3. Hypomagnesemia    4. ETOH abuse       Disposition:  Admit  4/27/2024 12:03 pm       Present on Admission  Date Reviewed: 2/6/2023            ICD-10-CM Noted POA    * (Principal) Generalized tonic-clonic seizure (HCC) G40.409 4/27/2024 Unknown               4/27 H&P  CHIEF COMPLAINT:  Alcohol abuse, seizure-like activity.     HISTORY OF PRESENT ILLNESS:  Patient is a 44-year-old male with past medical history of seizure disorder, who had come to the hospital for a seizure evaluation.  History has been obtained from patient's family and fiancee, that he had 2 generalized tonic-clonic seizures, which lasted about 2 minutes.  The patient did have initially a brain infection a few years ago and was admitted with seizures at that time.  Patient continues to drink half a pint of alcohol daily, last drink was about 3 days ago.  Currently denies any chest pain, shortness of breath, palpitation, nausea, vomiting, diarrhea.       PAST MEDICAL HISTORY:  Positive for seizure disorder.    REVIEW OF SYSTEMS:  A 10-point review of systems has been obtained and is otherwise negative.       PHYSICAL EXAMINATION:    GENERAL:  Patient is lying in bed, appears to be in mild to moderate distress at this time.  He is alert, answering questions.  VITAL SIGNS:  Patient's blood pressure is 120/91, pulse is 106, respirations are 18, temperature is 98.8, saturation 97% on room air.  HEENT:  Extraocular movements are intact.  Pupils equal, round,  reactive to light and accommodation.  Atraumatic, normocephalic.  LUNGS:  Good air entry bilaterally.  HEART:  S1, S2 appreciated.  ABDOMEN:  Soft, nontender, nondistended.  Positive bowel sounds.  EXTREMITIES:  Peripheral pulses are positive.  NEUROLOGIC:  Patient does have no focal deficits currently.  Gait is intact.      LABORATORY DATA:  Glucose is 157, sodium is 136, potassium is 2.9, chloride is 97, carbon dioxide is 21, BUN is 10, creatinine is 0.87.  Alkaline phosphatase is 160, , ALT is 79, total bilirubin is 3.0, globulin is 3.2.  Magnesium is 1.4.  Lipase is 78.  WBC is 6.4, hemoglobin is 13.5, hematocrit is 36.8, platelets are 69.  Ethanol level is less than 3.        Patient's CT head has been negative.  Patient is found to have stable encephalomalacia at the right frontal lobe.     IMAGING:  As above.     ASSESSMENT AND PLAN:  Patient is a 44-year-old male with past medical history of alcohol abuse and seizure disorder, who has been admitted to the hospital with alcohol withdrawal seizures.     1.       Generalized tonic-clonic seizures.  At this time, patient will be admitted to the hospital, started on CIWA protocol.  Neurology has been placed on consult.  We will appreciate recommendations.  We will continue to monitor the patient closely.  2.       Severe hypokalemia.  We will aggressively replete.  Initial electrolyte replacement protocol.  Repeat labs in a.m. and recheck.  3.       Hypomagnesemia.  We will replete as well.  4.       Alcohol abuse.  As above.  5.       Thrombocytopenia, likely in the setting of chronic alcohol use.  6.       Transaminitis, likely due to above as well.  7.       VTE prophylaxis.  We will hold pharmacologic VTE prophylaxis in the setting of thrombocytopenia.  8.       Disposition:  At this time, we will continue to monitor the patient closely.  Patient is a Full Code.  Further recommendations to follow.          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  His last alcoholic drink was about 2 to 3 days ago.     He was last seen by neurology for this Jan 2023 when he was recommended to stay on lacosamide indefinitely.     He says he is more clear-headed since stopping drinking; he says he now remembers the date fully, when he was forgetting the month previously.  His vision is better.  He has alcohol-related neuropathy (\"burning\").  He has headaches with withdrawal but these are better.  No seizures since Jan 2023 until now.      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  His last alcoholic drink was about 2 to 3 days ago.      His neurological examination is non-focal.       Focal and generalized epilepsy likely related to his history of right-sided subdural empyema, now exacerbated by alcohol withdrawal  Alcohol-related neuropathy   - Patient is not recommended to come off of his seizure medication.  He should continue Keppra 500 mg twice daily.    - Seizure precautions including no driving for 6 months  - The patient should abstain from further alcohol use   -Thiamine, folic acid and vitamin B12 supplement  -Lidocaine patch to neuropathic pain in legs   -No driving for 6 months - written instructions provided, have asked RN to remind patient before discharge as well              4/28 IM  Chief Complaint:      Generalized tonic-clonic seizure        Subjective:  Subjective:     Patient seen and examined this morning  Slightly drowsy but overall states he is feeling better  Family at bedside  Tachycardic afebrile     Blood pressure (!) 136/100, pulse 101, temperature 98.8 °F (37.1 °C), temperature source  Oral, resp. rate 18, height 5' 11\" (1.803 m), weight 172 lb 6.4 oz (78.2 kg), SpO2 99%.  Physical Exam     General: Patient is alert and oriented x3 does not appear to be in acute distress at this time  HEENT: EOMI PERRLA, atraumatic normocephalic  Cardiac: S1-S2 appreciated  Lungs: Good air entry bilaterally clear to auscultation  Abdomen: Soft nontender nondistended positive bowel sounds  Ext: Peripheral pulses are positive  Neuro: No focal deficits noted  Psych: Normal mood  Skin: No rashes noted  MSK: Full range of motion intact        CREATSERUM 0.64 (L) 04/28/2024     BUN 6 (L) 04/28/2024      04/28/2024     K 3.4 (L) 04/28/2024     K 3.4 (L) 04/28/2024      04/28/2024     CO2 27.0 04/28/2024      (H) 04/28/2024     CA 9.0 04/28/2024     ALB 3.9 04/28/2024     ALKPHO 141 (H) 04/28/2024     BILT 2.2 (H) 04/28/2024     TP 6.6 04/28/2024      (H) 04/28/2024     ALT 97 (H) 04/28/2024         MG 2.0 04/28/2024     MG 2.0 04/28/2024     PHOS 1.2 (L) 04/28/2024     Assessment & Plan:  Generalized tonic-clonic seizures.    - patient will be admitted to the hospital, started on CIWA protocol.    -Neurology has been placed on consult.    -We will appreciate recommendations.    -We will continue to monitor the patient closely.     Severe hypokalemia.   - We will aggressively replete.  Initial electrolyte replacement protocol.    -Repeat labs in a.m. and recheck.     Hypomagnesemia.  We will replete as well.  Alcohol abuse.  As above.  Thrombocytopenia, likely in the setting of chronic alcohol use.  Transaminitis, likely due to above as well.     VTE prophylaxis.  We will hold pharmacologic VTE prophylaxis in the setting of thrombocytopenia.     Disposition:  At this time, we will continue to monitor the patient closely.  Patient is a Full Code.  Further recommendations to follow.     Global A/P  -hypophosphatemia - replete  -transaminitis - worsening - will continue to monitor.                 -  recent Liver US 03/03/2024 - had sludge at that time.   -Reviewed previous consultant notes  -Reviewed CBC, BMP, Mag, and Phos  -Reviewed tests ordered  -Repeat labs in am  -MDM: High, severe exacerbation of chronic illness posing a threat to life. IV medications requiring close inpatient monitoring.        Assessment & Plan:  Generalized tonic-clonic seizures.    - patient will be admitted to the hospital, started on CIWA protocol.    -Neurology has been placed on consult.    -We will appreciate recommendations.    -We will continue to monitor the patient closely.     Severe hypokalemia.   - We will aggressively replete.  Initial electrolyte replacement protocol.    -Repeat labs in a.m. and recheck.     Hypomagnesemia.  We will replete as well.  Alcohol abuse.  As above.  Thrombocytopenia, likely in the setting of chronic alcohol use.  Transaminitis, likely due to above as well.     VTE prophylaxis.  We will hold pharmacologic VTE prophylaxis in the setting of thrombocytopenia.     Disposition:  At this time, we will continue to monitor the patient closely.  Patient is a Full Code.  Further recommendations to follow.     Global A/P  -hypophosphatemia - replete  -transaminitis - worsening - will continue to monitor.                 - recent Liver  03/03/2024 - had sludge at that time.   -Reviewed previous consultant notes  -Reviewed CBC, BMP, Mag, and Phos  -Reviewed tests ordered  -Repeat labs in am  -MDM: High, severe exacerbation of chronic illness posing a threat to life. IV medications requiring close inpatient monitoring.              Medications 04/27/24 04/28/24 04/29/24   levETIRAcetam (Keppra) 500 mg/5mL injection 1,000 mg  Dose: 1,000 mg  Freq: Once Route: IV  Start: 04/27/24 1201 End: 04/27/24 1218   Admin Instructions:   Administer slow IV push over 2 minutes    1216 ST-Given            levETIRAcetam (Keppra) 500 mg/5mL injection 500 mg  Dose: 500 mg  Freq: Every 12 hours Route: IV  Start: 04/27/24  2100   Admin Instructions:   Administer slow IV push over 2 minutes    2114 WG-Given        0852 TH-Given   2036 WG-Given       0850 CE-Given   2100         lidocaine-menthol 4-1 % patch 2 patch  Dose: 2 patch  Freq: Daily Route: TD  Start: 04/27/24 1730   Order specific questions:       2122 WG-Patch Applied [C]        0851 TH-Patch Applied   0922 TH-Patch removed   2051 WG-Patch removed      0853 CE-Patch Applied   2053 (Patch removed)-CE         magnesium sulfate 4 g/100mL IVPB premix 4 g  Dose: 4 g  Freq: Once Route: IV  Last Dose: 4 g (04/27/24 1216)  Start: 04/27/24 1141 End: 04/27/24 1416   Order specific questions:       1216 ST-New Bag            potassium chloride (Klor-Con M20) tab 40 mEq  Dose: 40 mEq  Freq: Every 4 hours Route: OR  Start: 04/29/24 0945 End: 04/29/24 1744   Admin Instructions:   Do not crush      1142 CE-Given   (1345 CE)-Not Given   1744-D/C'd      potassium chloride (Klor-Con) 20 MEQ oral powder 40 mEq  Dose: 40 mEq  Freq: Once Route: OR  Start: 04/27/24 1900 End: 04/27/24 2118 2118 WG-Given            potassium chloride (Klor-Con) 20 MEQ oral powder 40 mEq  Dose: 40 mEq  Freq: Once Route: OR  Start: 04/27/24 1234 End: 04/27/24 1248    1248 ST-Given            potassium phosphate dibasic 30 mmol in sodium chloride 0.9% 250 mL IVPB  Dose: 30 mmol  Freq: Once Route: IV  Last Dose: 30 mmol (04/28/24 0520)  Start: 04/28/24 0600 End: 04/28/24 0920   Admin Instructions:   Administer through peripheral line     0520 WG-New Bag           sodium chloride 0.9 % IV bolus 1,000 mL  Dose: 1,000 mL  Freq: Once Route: IV  Last Dose: 1,000 mL (04/27/24 1130)  Start: 04/27/24 1142 End: 04/27/24 1230    1130 ST-New Bag                         Medications 04/27/24 04/28/24 04/29/24   sodium chloride 0.9% infusion  Rate: 100 mL/hr  Freq: Continuous Route: IV  Start: 04/27/24 1500 End: 04/28/24 2107    1515 PL-New Bag        0108 WG-New Bag   2107-D/C'd                 Medications 04/27/24 04/28/24  04/29/24   acetaminophen (Tylenol) 325 MG tab  Start: 04/27/24 1544 End: 04/27/24 1545   Admin Instructions:   Angie Moulton   : fernandot override    1545 PL-Given             LORazepam (Ativan) tab 1 mg  Dose: 1 mg  Freq: Every 1 hour PRN Route: OR  PRN Comment: For CIWA 7-14  Start: 04/27/24 1528 End: 04/28/24 2107   Admin Instructions:   CIWA 7-14: Give every hour until CIWA is less than 7.  Tablets can be crushed prior to administration. If patient unable to tolerate oral route, contact provider    8293 PL-Given        2107-D/C'd                 MEDICATIONS ADMINISTERED IN LAST 1 DAY:  folic acid (Folvite) tab 1 mg       Date Action Dose Route User    4/29/2024 0848 Given 1 mg Oral Agustina Dye RN          levETIRAcetam (Keppra) 500 mg/5mL injection 500 mg       Date Action Dose Route User    4/29/2024 0850 Given 500 mg Intravenous Agustina Dye RN    4/28/2024 2036 Given 500 mg Intravenous Maryam Dorsey RN          lidocaine-menthol 4-1 % patch 2 patch       Date Action Dose Route User    4/29/2024 0853 Patch Applied 2 patch Transdermal (Right Leg) Agustina Dye RN          nicotine (Nicoderm CQ) 21 MG/24HR patch 1 patch       Date Action Dose Route User    4/29/2024 0853 Patch Applied 1 patch Transdermal (Right Upper Arm) Agustina Dye RN          potassium chloride (Klor-Con M20) tab 40 mEq       Date Action Dose Route User    4/29/2024 1142 Given 40 mEq Oral Agustina Dye RN          thiamine (Vitamin B1) tab 100 mg       Date Action Dose Route User    4/29/2024 0848 Given 100 mg Oral Agustina Dye RN    4/28/2024 2036 Given 100 mg Oral Maryam Dorsey RN    4/28/2024 1500 Given 100 mg Oral Ольга Parker RN          cyanocobalamin (Vitamin B12) tab 1,000 mcg       Date Action Dose Route User    4/29/2024 0848 Given 1,000 mcg Oral Agustina Dye RN          zolpidem (Ambien) tab 5 mg       Date Action Dose Route User    4/28/2024 0720 Given 5 mg Oral Willian  DEONNA Francisco            Vitals (last day)       Date/Time Temp Pulse Resp BP SpO2 Weight O2 Device O2 Flow Rate (L/min) [facility]    04/29/24 1146 -- 98 -- -- -- -- -- --     04/29/24 0847 99.1 °F (37.3 °C) 88 18 135/92 97 % -- None (Room air) --     04/29/24 0409 99.1 °F (37.3 °C) -- 18 155/106 96 % -- None (Room air) --     04/28/24 2000 99.2 °F (37.3 °C) -- 18 145/102 100 % -- None (Room air) --     04/28/24 1905 -- 92 -- -- -- -- -- --     04/28/24 1610 97.7 °F (36.5 °C) 94 16 142/101 100 % -- None (Room air) --     04/28/24 1400 -- 87 -- -- -- -- -- --     04/28/24 1200 -- 90 -- -- -- -- -- --     04/28/24 1000 -- 101 -- -- -- -- -- --     04/28/24 0850 98.8 °F (37.1 °C) 89 18 136/100 99 % -- None (Room air) --     04/28/24 0707 -- 96 -- -- -- -- -- --     04/28/24 0700 98.9 °F (37.2 °C) -- 16 136/96 98 % -- None (Room air) --     04/28/24 0500 -- -- 18 142/99 99 % -- None (Room air) --     04/28/24 0300 -- 104 -- 140/100 -- -- -- --     04/28/24 0100 99.3 °F (37.4 °C) 104 -- 136/101 97 % -- None (Room air) --           CIWA Scores (since admission)       Date/Time CIWA-Ar Total [facility]    04/28/24 2000 1     04/28/24 1610 0 TH 04/28/24 1400 0 TH    04/28/24 1200 0 TH    04/28/24 1000 0 TH    04/28/24 0850 1 TH    04/28/24 0700 1 WG    04/28/24 0500 4 WG    04/28/24 0300 2 WG    04/28/24 0100 2 WG    04/27/24 2300 2 WG    04/27/24 2100 2 WG    04/27/24 1900 3 PL    04/27/24 1732 4 PL    04/27/24 1600 5 PL    04/27/24 1405 7 PL    04/27/24 1100 3 ST

## 2024-04-29 NOTE — PROGRESS NOTES
PT discharged, gf provided transportation home.  Discharge paperwork and prescriptions reviewed, all questions and concerns addressed.  IV access and tele discontinued.

## 2024-04-30 ENCOUNTER — PATIENT OUTREACH (OUTPATIENT)
Dept: CASE MANAGEMENT | Age: 44
End: 2024-04-30

## 2024-04-30 NOTE — PROGRESS NOTES
Left message on mailbox for pt to call [facility] back for HFU.  [facility] contact information [phone] included in message.

## 2024-04-30 NOTE — PAYOR COMM NOTE
--------------  DISCHARGE REVIEW    Payor: [facility]  Subscriber #:  [id number]  Authorization Number: [id number]    Admit date: 24  Admit time:   1:31 PM  Discharge Date: 2024  5:37 PM     Admitting Physician: Vickie Rivera MD  Attending Physician:  No att. providers found  Primary Care Physician: D'Amico, Jeff Anthony, DO          Discharge Summary Notes        Discharge Summary signed by Vickie Rivera MD at 2024 11:13 AM       Author: Vickie Rivera MD Specialty: HOSPITALIST, Internal Medicine Author Type: Physician    Filed: 2024 11:13 AM Date of Service: 2024 11:12 AM Status: Signed    : Vickie Rivera MD (Physician)         [facility]  part of [facility]     Discharge Summary    Rodolfo Valdez Patient Status:  Inpatient    1980 MRN [id number]   Location [facility] 5SW/SE Attending Vickie Rivera MD   Hosp Day # 2 PCP Jeff Anthony D'Amico, DO     Date of Admission: 2024    Date of Discharge: 2024    Admitting Diagnosis: Hypokalemia [E87.6]  Hypomagnesemia [E83.42]  ETOH abuse [F10.10]  Generalized tonic-clonic seizure (HCC) [G40.409]    Discharge Diagnosis:   Patient Active Problem List   Diagnosis    Severe sepsis (HCC)    Community acquired pneumonia of left lower lobe of lung    GUILLERMINA (acute kidney injury) (HCC)    Hyponatremia    Focal motor seizure (HCC)    Subdural empyema (HCC)    Syncope and collapse    Confusion and disorientation    Hypokalemia    Hypomagnesemia    Generalized tonic-clonic seizure (HCC)    ETOH abuse    Neuropathy, alcoholic (HCC)       Reason for Admission:     Generalized Tonic-clonic Seizure    Physical Exam:     General: Patient is alert and oriented x3 does not appear to be in acute distress at this time  HEENT: EOMI PERRLA, atraumatic normocephalic  Cardiac: S1-S2 appreciated  Lungs: Good air entry bilaterally clear to auscultation  Abdomen:  Soft nontender nondistended positive bowel sounds  Ext: Peripheral pulses are positive  Neuro: No focal deficits noted  Psych: Normal mood  Skin: No rashes noted  MSK: Full range of motion intact      Hospital Course:     Generalized tonic-clonic seizures.    - patient will be admitted to the hospital, started on CIWA protocol.    -Neurology has been placed on consult.    -We will appreciate recommendations.    -We will continue to monitor the patient closely.     Severe hypokalemia.   - We will aggressively replete.  Initial electrolyte replacement protocol.    -Repeat labs in a.m. and recheck.     Hypomagnesemia.  We will replete as well.  Alcohol abuse.  As above.  Thrombocytopenia, likely in the setting of chronic alcohol use.  Transaminitis, likely due to above as well.     VTE prophylaxis.  We will hold pharmacologic VTE prophylaxis in the setting of thrombocytopenia.     Disposition:  At this time, we will continue to monitor the patient closely.  Patient is a Full Code.  Further recommendations to follow.     Global A/P  -hypophosphatemia - replete  -transaminitis - worsening - will continue to monitor.                 - recent Liver  03/03/2024 - had sludge at that time.     History of Present Illness:     Patient is a 44-year-old male with past medical history of seizure disorder, who had come to the hospital for a seizure evaluation. History has been obtained from patient's family and fiancee, that he had 2 generalized tonic-clonic seizures, which lasted about 2 minutes. The patient did have initially a brain infection a few years ago and was admitted with seizures at that time. Patient continues to drink half a pint of alcohol daily, last drink was about 3 days ago. Currently denies any chest pain, shortness of breath, palpitation, nausea, vomiting, diarrhea.     Disposition: Home or Self Care    Discharge Condition: Stable    Discharge Medications:   Current Discharge Medication List        START  taking these medications    Details   cyanocobalamin 1000 MCG Oral Tab Take 1 tablet (1,000 mcg total) by mouth daily.  Qty: 30 tablet, Refills: 0      folic acid 1 MG Oral Tab Take 1 tablet (1 mg total) by mouth daily.  Qty: 30 tablet, Refills: 0      HYDROcodone-acetaminophen 5-325 MG Oral Tab Take 1 tablet by mouth every 6 (six) hours as needed.  Qty: 15 tablet, Refills: 0    Associated Diagnoses: Neuropathy, alcoholic (HCC)      thiamine 100 MG Oral Tab Take 1 tablet (100 mg total) by mouth in the morning, at noon, and at bedtime.  Qty: 30 tablet, Refills: 0           CONTINUE these medications which have CHANGED    Details   lidocaine-menthol 4-1 % External Patch Place 1 patch onto the skin daily.  Qty: 15 patch, Refills: 0             Total dc time > 30 min    Vickie Rivera MD  4/29/2024  11:12 AM     Hospital Discharge Diagnoses:  Generalized weakness    Lace+ Score: 49  59-90 High Risk  29-58 Medium Risk  0-28   Low Risk.    TCM Follow-Up Recommendation:  LACE 29-58: Moderate Risk of readmission after discharge from the hospital.       Electronically signed by Vickie Rivera MD on 4/29/2024 11:13 AM         REVIEWER COMMENTS

## 2024-06-05 ENCOUNTER — TELEPHONE (OUTPATIENT)
Dept: INTERNAL MEDICINE CLINIC | Facility: CLINIC | Age: 44
End: 2024-06-05

## 2024-06-05 DIAGNOSIS — G62.1 NEUROPATHY, ALCOHOLIC (HCC): ICD-10-CM

## 2024-06-06 NOTE — TELEPHONE ENCOUNTER
Not prescribed by our office. Pt has not established care yet but has appt on 6/11. To be discussed at appt.     To Dr. VARELA--    Was this a NH patient?

## 2024-06-10 NOTE — TELEPHONE ENCOUNTER
Called and spoke wit pt who is scheduled for appointment 6-11 @ 12:30 PM with Dr VARELA and will ask for prescription refill at that time

## 2024-06-10 NOTE — TELEPHONE ENCOUNTER
History and Physical / Pre-Sedation Assessment    Patient:  Jamal Hutchison  :   1949    Intended Procedure: Aortogram with BLE angiogram, possible LLE intervention      HPI: Short distance claudication, see note dated   Nurses notes reviewed and agreed.  Medications reviewed  Allergies:   Patient has no known allergies.        Physical Exam:   CURRENT VITALS:  There were no vitals taken for this visit.  Airway:Normal  Cardiac:Normal  Pulmonary:Normal  Abdomen:Normal        Pre-Procedure Assessment/Plan:  ASA 2 - Patient with mild systemic disease with no functional limitations    Mallampati Airway Assessment:  Mallampati Class II - (soft palate, fauces & uvula are visible)    Level of Sedation Plan:Moderate sedation    Post Procedure plan: Return to same level of care    I assessed the patient and find that the patient is in satisfactory condition to proceed with the planned procedure and sedation plan.    I have explained the risk, benefits, and alternatives to the procedure. The patient understands and agrees to proceed.  Yes    Nathan Castaneda         No, I have never heard of this patient.

## 2024-06-21 RX ORDER — HYDROCODONE BITARTRATE AND ACETAMINOPHEN 5; 325 MG/1; MG/1
1 TABLET ORAL EVERY 6 HOURS PRN
Qty: 15 TABLET | Refills: 0 | OUTPATIENT
Start: 2024-06-21

## 2024-08-13 ENCOUNTER — TELEPHONE (OUTPATIENT)
Dept: INTERNAL MEDICINE CLINIC | Facility: CLINIC | Age: 44
End: 2024-08-13

## 2024-08-13 RX ORDER — LEVETIRACETAM 500 MG/1
500 TABLET ORAL 2 TIMES DAILY
Qty: 60 TABLET | Refills: 1 | Status: CANCELLED | OUTPATIENT
Start: 2024-08-13

## 2024-08-13 NOTE — TELEPHONE ENCOUNTER
Patient is calling and states he needs a refill for his seizure medication.  Patient was prescribe the seizure  medication when he was in the hospital on 4/27/2024.    Patient does not know the name of medication and the medication containers it is printed on is worn off.    Please advise

## 2024-08-13 NOTE — TELEPHONE ENCOUNTER
This is not my patient, never have seen this patient nor is he ever established with me, I have never filled the medication for him, and will not be, my panel for new patients has been closed.  Declined prescriptions, I would encourage him follow-up with his usual primary care provider

## 2024-08-13 NOTE — TELEPHONE ENCOUNTER
Per med module, Levetiracetam 500 MG take 1 BID #60 was sent to [facility] by  on 4/29/24 when patient was discharged from [facility].     To Dr.Damico to please advise on the pending refill request

## 2024-08-14 NOTE — TELEPHONE ENCOUNTER
Called patient who is out of seizure medication , had no prior PCP ,  does not take new patients - RN explained that he needs to go back to ER in regards to meds - he needs to explain his situation there since neurologist saw him there and prescribed medication-verbalized understanding

## 2024-08-14 NOTE — TELEPHONE ENCOUNTER
Patient returned our call.    Patient wanted to schedule appointment with Dr. D'Amico as he has [facility] and he missed the 6/11/24 New Patient visit with him.     Advised patient that Dr. D'Amico is currently not taking New Patients.     Encouraged patient to follow up with [facility] to obtain a list of In Network providers open for new patients.     Patient stated he does not have a current provider and is completely out of medication for past 2 days. He is concerned he will have another seizure and is looking for medical advice for next steps until he can get a Primary Care Provider.     Message routed to clinical.    Patient #[phone]

## 2024-08-14 NOTE — TELEPHONE ENCOUNTER
To FD - this is NOT our patient. See Insurance Plan Notification and Appt Hx - 'NO SHOW' to 6/11 NP Consult. Referred back to ER. Please be cautious prior to routing to clinical team, again this is not an EMA Patient. [employer] also has been unsuccessful in reaching [facility]. Please make any appropriate notes to avoid future error.

## 2024-08-15 ENCOUNTER — HOSPITAL ENCOUNTER (EMERGENCY)
Facility: HOSPITAL | Age: 44
Discharge: LEFT BEFORE DISCHARGE | End: 2024-08-15
Payer: MEDICAID

## 2024-08-15 VITALS
HEIGHT: 71 IN | TEMPERATURE: 98 F | SYSTOLIC BLOOD PRESSURE: 148 MMHG | OXYGEN SATURATION: 97 % | BODY MASS INDEX: 25.2 KG/M2 | RESPIRATION RATE: 20 BRPM | HEART RATE: 116 BPM | WEIGHT: 180 LBS | DIASTOLIC BLOOD PRESSURE: 98 MMHG

## 2024-08-15 NOTE — ED INITIAL ASSESSMENT (HPI)
Pt presents stating that he was seen here previously after being diagnosed with seizures and was given 3 months of medications.  Pt was given a follow-up referral, but he did not attend his follow-up appointment. The follow-up appointment was supposed to be months ago.  Pt has been out of his medications for a few days.

## 2024-08-17 ENCOUNTER — APPOINTMENT (OUTPATIENT)
Dept: ULTRASOUND IMAGING | Facility: HOSPITAL | Age: 44
End: 2024-08-17
Attending: STUDENT IN AN ORGANIZED HEALTH CARE EDUCATION/TRAINING PROGRAM
Payer: MEDICAID

## 2024-08-17 ENCOUNTER — HOSPITAL ENCOUNTER (INPATIENT)
Facility: HOSPITAL | Age: 44
LOS: 1 days | Discharge: HOME | End: 2024-08-19
Attending: STUDENT IN AN ORGANIZED HEALTH CARE EDUCATION/TRAINING PROGRAM | Admitting: HOSPITALIST
Payer: MEDICAID

## 2024-08-17 DIAGNOSIS — E80.6 HYPERBILIRUBINEMIA: ICD-10-CM

## 2024-08-17 DIAGNOSIS — F10.930 ALCOHOL WITHDRAWAL SYNDROME WITHOUT COMPLICATION (HCC): Primary | ICD-10-CM

## 2024-08-17 LAB
ALBUMIN SERPL-MCNC: 4.5 G/DL (ref 3.2–4.8)
ALBUMIN/GLOB SERPL: 1.3 {RATIO} (ref 1–2)
ALP LIVER SERPL-CCNC: 223 U/L
ALT SERPL-CCNC: 147 U/L
ANION GAP SERPL CALC-SCNC: 20 MMOL/L (ref 0–18)
AST SERPL-CCNC: 617 U/L (ref ?–34)
BASOPHILS # BLD AUTO: 0.03 X10(3) UL (ref 0–0.2)
BASOPHILS NFR BLD AUTO: 0.6 %
BILIRUB SERPL-MCNC: 3.7 MG/DL (ref 0.3–1.2)
BUN BLD-MCNC: 7 MG/DL (ref 9–23)
BUN/CREAT SERPL: 10 (ref 10–20)
CALCIUM BLD-MCNC: 9.5 MG/DL (ref 8.7–10.4)
CHLORIDE SERPL-SCNC: 91 MMOL/L (ref 98–112)
CO2 SERPL-SCNC: 23 MMOL/L (ref 21–32)
CREAT BLD-MCNC: 0.7 MG/DL
DEPRECATED RDW RBC AUTO: 52.4 FL (ref 35.1–46.3)
EGFRCR SERPLBLD CKD-EPI 2021: 117 ML/MIN/1.73M2 (ref 60–?)
EOSINOPHIL # BLD AUTO: 0 X10(3) UL (ref 0–0.7)
EOSINOPHIL NFR BLD AUTO: 0 %
ERYTHROCYTE [DISTWIDTH] IN BLOOD BY AUTOMATED COUNT: 18 % (ref 11–15)
ETHANOL SERPL-MCNC: <3 MG/DL (ref ?–3)
GLOBULIN PLAS-MCNC: 3.4 G/DL (ref 2–3.5)
GLUCOSE BLD-MCNC: 105 MG/DL (ref 70–99)
HCT VFR BLD AUTO: 37.7 %
HGB BLD-MCNC: 13.3 G/DL
IMM GRANULOCYTES # BLD AUTO: 0.01 X10(3) UL (ref 0–1)
IMM GRANULOCYTES NFR BLD: 0.2 %
LYMPHOCYTES # BLD AUTO: 1.38 X10(3) UL (ref 1–4)
LYMPHOCYTES NFR BLD AUTO: 26.7 %
MCH RBC QN AUTO: 30.6 PG (ref 26–34)
MCHC RBC AUTO-ENTMCNC: 35.3 G/DL (ref 31–37)
MCV RBC AUTO: 86.9 FL
MONOCYTES # BLD AUTO: 0.55 X10(3) UL (ref 0.1–1)
MONOCYTES NFR BLD AUTO: 10.6 %
NEUTROPHILS # BLD AUTO: 3.2 X10 (3) UL (ref 1.5–7.7)
NEUTROPHILS # BLD AUTO: 3.2 X10(3) UL (ref 1.5–7.7)
NEUTROPHILS NFR BLD AUTO: 61.9 %
OSMOLALITY SERPL CALC.SUM OF ELEC: 276 MOSM/KG (ref 275–295)
PLATELET # BLD AUTO: 38 10(3)UL (ref 150–450)
PLATELET MORPHOLOGY: NORMAL
POTASSIUM SERPL-SCNC: 3.4 MMOL/L (ref 3.5–5.1)
PROT SERPL-MCNC: 7.9 G/DL (ref 5.7–8.2)
RBC # BLD AUTO: 4.34 X10(6)UL
SODIUM SERPL-SCNC: 134 MMOL/L (ref 136–145)
WBC # BLD AUTO: 5.2 X10(3) UL (ref 4–11)

## 2024-08-17 PROCEDURE — HZ2ZZZZ DETOXIFICATION SERVICES FOR SUBSTANCE ABUSE TREATMENT: ICD-10-PCS | Performed by: STUDENT IN AN ORGANIZED HEALTH CARE EDUCATION/TRAINING PROGRAM

## 2024-08-17 PROCEDURE — 76705 ECHO EXAM OF ABDOMEN: CPT | Performed by: STUDENT IN AN ORGANIZED HEALTH CARE EDUCATION/TRAINING PROGRAM

## 2024-08-17 RX ORDER — ONDANSETRON 2 MG/ML
4 INJECTION INTRAMUSCULAR; INTRAVENOUS ONCE
Status: COMPLETED | OUTPATIENT
Start: 2024-08-17 | End: 2024-08-17

## 2024-08-17 RX ORDER — LEVETIRACETAM 500 MG/1
500 TABLET ORAL ONCE
Status: COMPLETED | OUTPATIENT
Start: 2024-08-17 | End: 2024-08-17

## 2024-08-17 RX ORDER — DOXEPIN HYDROCHLORIDE 50 MG/1
1 CAPSULE ORAL ONCE
Status: DISCONTINUED | OUTPATIENT
Start: 2024-08-17 | End: 2024-08-19

## 2024-08-17 RX ORDER — THIAMINE HYDROCHLORIDE 100 MG/ML
100 INJECTION, SOLUTION INTRAMUSCULAR; INTRAVENOUS ONCE
Status: COMPLETED | OUTPATIENT
Start: 2024-08-17 | End: 2024-08-17

## 2024-08-17 RX ORDER — LORAZEPAM 1 MG/1
2 TABLET ORAL ONCE
Status: COMPLETED | OUTPATIENT
Start: 2024-08-17 | End: 2024-08-17

## 2024-08-17 RX ORDER — CHLORDIAZEPOXIDE HYDROCHLORIDE 25 MG/1
50 CAPSULE, GELATIN COATED ORAL ONCE
Status: COMPLETED | OUTPATIENT
Start: 2024-08-17 | End: 2024-08-17

## 2024-08-17 RX ORDER — FAMOTIDINE 10 MG/ML
20 INJECTION, SOLUTION INTRAVENOUS ONCE
Status: COMPLETED | OUTPATIENT
Start: 2024-08-17 | End: 2024-08-17

## 2024-08-17 RX ORDER — DOXEPIN HYDROCHLORIDE 50 MG/1
1 CAPSULE ORAL ONCE
Status: COMPLETED | OUTPATIENT
Start: 2024-08-17 | End: 2024-08-17

## 2024-08-17 NOTE — ED INITIAL ASSESSMENT (HPI)
Patient arrived via EMS, c/o of dizziness, stating his legs feel weak for 4 days. States he ran out of seizure medications, last seizure in April. Last drink 4 days ago

## 2024-08-18 PROBLEM — F39 EPISODIC MOOD DISORDER (HCC): Status: ACTIVE | Noted: 2024-08-18

## 2024-08-18 PROBLEM — E80.6 HYPERBILIRUBINEMIA: Status: ACTIVE | Noted: 2024-08-18

## 2024-08-18 PROBLEM — F10.930 ALCOHOL WITHDRAWAL SYNDROME WITHOUT COMPLICATION (HCC): Status: ACTIVE | Noted: 2024-08-18

## 2024-08-18 LAB — MAGNESIUM SERPL-MCNC: 1.2 MG/DL (ref 1.6–2.6)

## 2024-08-18 PROCEDURE — 90792 PSYCH DIAG EVAL W/MED SRVCS: CPT | Performed by: NURSE PRACTITIONER

## 2024-08-18 RX ORDER — ONDANSETRON 2 MG/ML
4 INJECTION INTRAMUSCULAR; INTRAVENOUS EVERY 6 HOURS PRN
Status: DISCONTINUED | OUTPATIENT
Start: 2024-08-18 | End: 2024-08-18

## 2024-08-18 RX ORDER — FOLIC ACID 1 MG/1
1 TABLET ORAL DAILY
Status: DISCONTINUED | OUTPATIENT
Start: 2024-08-18 | End: 2024-08-19

## 2024-08-18 RX ORDER — ONDANSETRON 2 MG/ML
4 INJECTION INTRAMUSCULAR; INTRAVENOUS EVERY 6 HOURS PRN
Status: DISCONTINUED | OUTPATIENT
Start: 2024-08-18 | End: 2024-08-19

## 2024-08-18 RX ORDER — DOCUSATE SODIUM 100 MG/1
100 CAPSULE, LIQUID FILLED ORAL 2 TIMES DAILY
Status: DISCONTINUED | OUTPATIENT
Start: 2024-08-18 | End: 2024-08-19

## 2024-08-18 RX ORDER — MULTIPLE VITAMINS W/ MINERALS TAB 9MG-400MCG
1 TAB ORAL DAILY
Status: DISCONTINUED | OUTPATIENT
Start: 2024-08-18 | End: 2024-08-19

## 2024-08-18 RX ORDER — MIRTAZAPINE 7.5 MG/1
7.5 TABLET, FILM COATED ORAL NIGHTLY
Status: DISCONTINUED | OUTPATIENT
Start: 2024-08-18 | End: 2024-08-19

## 2024-08-18 RX ORDER — POLYETHYLENE GLYCOL 3350 17 G/17G
17 POWDER, FOR SOLUTION ORAL DAILY PRN
Status: DISCONTINUED | OUTPATIENT
Start: 2024-08-18 | End: 2024-08-19

## 2024-08-18 RX ORDER — LORAZEPAM 1 MG/1
1 TABLET ORAL
Status: DISCONTINUED | OUTPATIENT
Start: 2024-08-18 | End: 2024-08-19

## 2024-08-18 RX ORDER — ENOXAPARIN SODIUM 100 MG/ML
40 INJECTION SUBCUTANEOUS DAILY
Status: DISCONTINUED | OUTPATIENT
Start: 2024-08-18 | End: 2024-08-19

## 2024-08-18 RX ORDER — MELATONIN
100 DAILY
Status: DISCONTINUED | OUTPATIENT
Start: 2024-08-19 | End: 2024-08-19

## 2024-08-18 RX ORDER — SENNOSIDES 8.6 MG
17.2 TABLET ORAL NIGHTLY PRN
Status: DISCONTINUED | OUTPATIENT
Start: 2024-08-18 | End: 2024-08-19

## 2024-08-18 RX ORDER — SODIUM PHOSPHATE, DIBASIC AND SODIUM PHOSPHATE, MONOBASIC 7; 19 G/230ML; G/230ML
1 ENEMA RECTAL ONCE AS NEEDED
Status: DISCONTINUED | OUTPATIENT
Start: 2024-08-18 | End: 2024-08-19

## 2024-08-18 RX ORDER — MORPHINE SULFATE 4 MG/ML
4 INJECTION, SOLUTION INTRAMUSCULAR; INTRAVENOUS ONCE
Status: COMPLETED | OUTPATIENT
Start: 2024-08-18 | End: 2024-08-18

## 2024-08-18 RX ORDER — LORAZEPAM 1 MG/1
2 TABLET ORAL
Status: DISCONTINUED | OUTPATIENT
Start: 2024-08-18 | End: 2024-08-19

## 2024-08-18 RX ORDER — LORAZEPAM 1 MG/1
1 TABLET ORAL
Status: DISCONTINUED | OUTPATIENT
Start: 2024-08-18 | End: 2024-08-18

## 2024-08-18 RX ORDER — HYDROCODONE BITARTRATE AND ACETAMINOPHEN 5; 325 MG/1; MG/1
1 TABLET ORAL EVERY 6 HOURS PRN
Status: DISCONTINUED | OUTPATIENT
Start: 2024-08-18 | End: 2024-08-19

## 2024-08-18 RX ORDER — SODIUM PHOSPHATE, DIBASIC AND SODIUM PHOSPHATE, MONOBASIC 7; 19 G/230ML; G/230ML
1 ENEMA RECTAL ONCE AS NEEDED
Status: DISCONTINUED | OUTPATIENT
Start: 2024-08-18 | End: 2024-08-18

## 2024-08-18 RX ORDER — BISACODYL 10 MG
10 SUPPOSITORY, RECTAL RECTAL
Status: DISCONTINUED | OUTPATIENT
Start: 2024-08-18 | End: 2024-08-18

## 2024-08-18 RX ORDER — LEVETIRACETAM 500 MG/1
500 TABLET ORAL 2 TIMES DAILY
Status: DISCONTINUED | OUTPATIENT
Start: 2024-08-18 | End: 2024-08-19

## 2024-08-18 RX ORDER — POLYETHYLENE GLYCOL 3350 17 G/17G
17 POWDER, FOR SOLUTION ORAL DAILY PRN
Status: DISCONTINUED | OUTPATIENT
Start: 2024-08-18 | End: 2024-08-18

## 2024-08-18 RX ORDER — POTASSIUM CHLORIDE 1500 MG/1
40 TABLET, EXTENDED RELEASE ORAL ONCE
Status: COMPLETED | OUTPATIENT
Start: 2024-08-18 | End: 2024-08-18

## 2024-08-18 RX ORDER — PROCHLORPERAZINE EDISYLATE 5 MG/ML
5 INJECTION INTRAMUSCULAR; INTRAVENOUS EVERY 8 HOURS PRN
Status: DISCONTINUED | OUTPATIENT
Start: 2024-08-18 | End: 2024-08-19

## 2024-08-18 RX ORDER — SODIUM CHLORIDE 9 MG/ML
INJECTION, SOLUTION INTRAVENOUS CONTINUOUS
Status: DISCONTINUED | OUTPATIENT
Start: 2024-08-18 | End: 2024-08-19

## 2024-08-18 RX ORDER — PROCHLORPERAZINE EDISYLATE 5 MG/ML
10 INJECTION INTRAMUSCULAR; INTRAVENOUS EVERY 6 HOURS PRN
Status: DISCONTINUED | OUTPATIENT
Start: 2024-08-18 | End: 2024-08-18

## 2024-08-18 RX ORDER — MAGNESIUM OXIDE 400 MG/1
800 TABLET ORAL ONCE
Status: COMPLETED | OUTPATIENT
Start: 2024-08-18 | End: 2024-08-18

## 2024-08-18 RX ORDER — LORAZEPAM 1 MG/1
2 TABLET ORAL
Status: DISCONTINUED | OUTPATIENT
Start: 2024-08-18 | End: 2024-08-18

## 2024-08-18 RX ORDER — BISACODYL 10 MG
10 SUPPOSITORY, RECTAL RECTAL
Status: DISCONTINUED | OUTPATIENT
Start: 2024-08-18 | End: 2024-08-19

## 2024-08-18 NOTE — PLAN OF CARE
Problem: Patient Centered Care  Goal: Patient preferences are identified and integrated in the patient's plan of care  Description: Interventions:  - What would you like us to know as we care for you? From home with partner  - Provide timely, complete, and accurate information to patient/family  - Incorporate patient and family knowledge, values, beliefs, and cultural backgrounds into the planning and delivery of care  - Encourage patient/family to participate in care and decision-making at the level they choose  - Honor patient and family perspectives and choices  Outcome: Progressing     Problem: Patient/Family Goals  Goal: Patient/Family Long Term Goal  Description: Patient's Long Term Goal:     Interventions:  - Monitor vitals  - Monitor appropriate labs  - Administer medications as ordered  - Follow MD's orders  - Update patient on plan of care   - Discharge planning     - See additional Care Plan goals for specific interventions  Outcome: Progressing  Goal: Patient/Family Short Term Goal  Description: Patient's Short Term Goal:     Interventions:   - Monitor vitals  - Monitor appropriate labs  - Administer medications as ordered  - Follow MD's orders  - Update patient on plan of care   - Discharge planning     - See additional Care Plan goals for specific interventions  Outcome: Progressing     Problem: GASTROINTESTINAL - ADULT  Goal: Minimal or absence of nausea and vomiting  Description: INTERVENTIONS:  - Maintain adequate hydration with IV or PO as ordered and tolerated  - Nasogastric tube to low intermittent suction as ordered  - Evaluate effectiveness of ordered antiemetic medications  - Provide nonpharmacologic comfort measures as appropriate  - Advance diet as tolerated, if ordered  - Obtain nutritional consult as needed  - Evaluate fluid balance  Outcome: Progressing     Problem: NEUROLOGICAL - ADULT  Goal: Absence of seizures  Description: INTERVENTIONS  - Monitor for seizure activity  - Administer  anti-seizure medications as ordered  - Monitor neurological status  Outcome: Progressing     Problem: PAIN - ADULT  Goal: Verbalizes/displays adequate comfort level or patient's stated pain goal  Description: INTERVENTIONS:  - Encourage pt to monitor pain and request assistance  - Assess pain using appropriate pain scale  - Administer analgesics based on type and severity of pain and evaluate response  - Implement non-pharmacological measures as appropriate and evaluate response  - Consider cultural and social influences on pain and pain management  - Manage/alleviate anxiety  - Utilize distraction and/or relaxation techniques  - Monitor for opioid side effects  - Notify MD/LIP if interventions unsuccessful or patient reports new pain  - Anticipate increased pain with activity and pre-medicate as appropriate  Outcome: Progressing     Problem: SAFETY ADULT - FALL  Goal: Free from fall injury  Description: INTERVENTIONS:  - Assess pt frequently for physical needs  - Identify cognitive and physical deficits and behaviors that affect risk of falls.  - [place] fall precautions as indicated by assessment.  - Educate pt/family on patient safety including physical limitations  - Instruct pt to call for assistance with activity based on assessment  - Modify environment to reduce risk of injury  - Provide assistive devices as appropriate  - Consider OT/PT consult to assist with strengthening/mobility  - Encourage toileting schedule  Outcome: Progressing     Problem: DISCHARGE PLANNING  Goal: Discharge to home or other facility with appropriate resources  Description: INTERVENTIONS:  - Identify barriers to discharge w/pt and caregiver  - Include patient/family/discharge partner in discharge planning  - Arrange for needed discharge resources and transportation as appropriate  - Identify discharge learning needs (meds, wound care, etc)  - Arrange for interpreters to assist at discharge as needed  - Consider post-discharge  preferences of patient/family/discharge partner  - Complete POLST form as appropriate  - Assess patient's ability to be responsible for managing their own health  - Refer to Case Management Department for coordinating discharge planning if the patient needs post-hospital services based on physician/LIP order or complex needs related to functional status, cognitive ability or social support system  Outcome: Progressing     Problem: ANXIETY  Goal: Will report anxiety at manageable levels  Description: INTERVENTIONS:  - Administer medication as ordered  - Teach and rehearse alternative coping skills  - Provide emotional support with 1:1 interaction with staff  Outcome: Progressing     Med rec completed with patient at bedside

## 2024-08-18 NOTE — H&P
[facility]   History and Physical  name: Rodolfo Valdez  [facility]  Date of admission: 8/17/2024    Primary care provider:   [unfilled]      SUBJECTIVE:  Reason for Admission: alcohol withdrawal    History ofPresent Illness: 44-year-old male with past medical history of seizure disorder on Keppra, alcohol abuse presented with alcohol withdrawal seizures. Patient reported having tremors, nausea, vomiting, and abdominal pain, so decided to come to the hospital for further evaluation. Patient has been drinking everyday whiskey, pint. He wants to stop drinking alcohol.      All Other ROS Negative except as mentioned in HPI    [facility]    Allergies   Allergen Reactions    Penicillins ANGIOEDEMA        Past Medical History:    Seizure disorder (HCC)       History reviewed. No pertinent surgical history.    History reviewed. No pertinent family history.    Social History     Socioeconomic History    Marital status: Single     Spouse name: Not on file    Number of children: Not on file    Years of education: Not on file    Highest education level: Not on file   Occupational History    Not on file   Tobacco Use    Smoking status: Former     Types: Cigarettes    Smokeless tobacco: Never   Vaping Use    Vaping status: Never Used   Substance and Sexual Activity    Alcohol use: Yes     Comment: sometimes, 3-4 days a week, half a pint of liquior and beer    Drug use: Yes     Types: Cannabis    Sexual activity: Not on file   Other Topics Concern    Not on file   Social History Narrative    Not on file     Social Determinants of Health     Financial Resource Strain: Not on file   Food Insecurity: No Food Insecurity (8/18/2024)    Food Insecurity     Food Insecurity: Never true   Transportation Needs: No Transportation Needs (8/18/2024)    Transportation Needs     Lack of Transportation: No     Car Seat: Not on file   Physical Activity: Not on file   Stress: Not on file   Social Connections: Not on file    Housing Stability: Low Risk  (8/18/2024)    Housing Stability     Housing Instability: No     Housing Instability Emergency: Not on file     Crib or Bassinette: Not on file         OBJECTIVE:  Patient Vitals for the past 24 hrs:   BP Temp Temp src Pulse Resp SpO2 Weight   08/18/24 2108 (!) 138/101 98.4 °F (36.9 °C) Oral 101 18 95 % --   08/18/24 1855 (!) 136/99 -- -- -- -- -- --   08/18/24 1700 114/86 98.2 °F (36.8 °C) Oral -- 18 95 % --   08/18/24 1500 (!) 138/106 -- -- -- -- -- --   08/18/24 1300 (!) 127/96 -- -- -- -- -- --   08/18/24 1100 (!) 136/102 -- -- 100 -- -- --   08/18/24 0900 (!) 132/96 -- -- 92 -- -- --   08/18/24 0800 (!) 128/101 98.5 °F (36.9 °C) Oral 99 19 91 % --   08/18/24 0700 -- -- -- 92 -- -- --   08/18/24 0500 (!) 133/99 98.6 °F (37 °C) Oral 120 20 -- --   08/18/24 0400 -- -- -- 91 -- -- --   08/18/24 0301 -- -- -- 94 -- -- --   08/18/24 0216 -- -- -- -- -- -- 156 lb 12.8 oz (71.1 kg)   08/18/24 0214 (!) 151/96 98.1 °F (36.7 °C) Oral 105 20 95 % --   08/18/24 0115 130/88 -- -- 98 18 94 % --       General: NAD  HEENT: NC/AT, MMM, OP clear with no exudates, PERRL.  Neck: Supple, No LAD  Heart: Normal s1/s2, no MRG  Chest: CTAB: NT/ND, soft, + Bs, no HSM  Ext: no peripheral edema.    Neuro: CN II through XII grossly intact.  No focal deficits     Diagnostic data:    CBC  Recent Labs   Lab 08/17/24 1837   WBC 5.2   HGB 13.3   PLT 38.0*       CMP  Recent Labs   Lab 08/17/24 1837   *   K 3.4*   CL 91*   CO2 23.0   BUN 7*   *   *   ALB 4.5       Coags:   Recent Labs   Lab 08/17/24 1837   PLT 38.0*         Urinalysis  Invalid input(s): \"COLURN\", \"CLARURNE\", \"PHURN\", \"PROTURN\", \"GLCSURN\", \"KTNSURN\", \"BILIRUURN\", \"BLOODURN\", \"NITRTURN\", \"UROBILIURN\", \"SPECGRVURN\", \"LEUKESTURN\", \"RBLCLURN\", \"WHTBLCLURN\", \"BACTUR\", \"MUCURN\", \"SQMIPICLURN\"    CardiacEnzymes  Invalid input(s): \"TROPONINT\"    Diabetes Studies  No results found for: \"GLUF\", \"MICROALBUR\",  \"CREATININE\"  .      Imaging:  US GALLBLADDER (CPT=76705)    Result Date: 8/18/2024  PROCEDURE: US GALLBLADDER (CPT=76705)  COMPARISON: [facility], US LIVER (CPT=76705), 3/03/2024, 0:54 AM.  INDICATIONS: abd pain, n/v  TECHNIQUE:   The gallbladder was evaluated with grayscale ultrasound.   FINDINGS:  GALLBLADDER:   The gallbladder is mildly distended.  There is biliary sludge.  No gallstone.  Mild gallbladder wall thickening.  pericholecystic fluid.  The Krishnamurthy's sign was reported negative by the sonographer. BILE DUCTS:   The common bile duct measures 11 mm, previously 10 mm.  No intrahepatic biliary ductal dilatation.  OTHER:   The liver is enlarged with the right hepatic lobe measuring 20.2 cm.  There is cirrhosis.  The liver echogenicity is increased and coarsened.  No visualized hepatic lesion.  There is a nodular liver contour.  There is heterogeneous echogenicity of the pancreas.  The right kidney measures 11.6 cm.  No right hydronephrosis.         CONCLUSION:   1. No acute cholecystitis or cholelithiasis.  Mild gallbladder wall thickening, which is likely secondary to underlying hepatic dysfunction. 2. No significant change in the dilatation of the common bile duct measuring up to 11 mm.  Recommend correlation with liver function tests and consider further evaluation with an MRI/MRCP.  3. Cirrhosis and hepatomegaly. 4. Redemonstrated heterogeneous echogenicity of the pancreas.   No significant change when compared to the preliminary radiology report from [facility].   Dictated by (CST): Attila Patino MD on 8/18/2024 at 7:59 AM     Finalized by (CST): Attila Patino MD on 8/18/2024 at 8:03 AM            ASSESSMENT:    PLAN:    Alcohol abuse: Monitor withdrawal symptoms, thiamine, folic acid, multivitamin, CIWA protocol.  Hyponatremia: Continue IV fluids.  Hypokalemia: Replacement protocol.  Transaminitis: Elevated AST and ALT likely due to alcohol abuse, continue to  monitor.  Hypomagnesemia: Replacement protocol.  Thrombocytopenia likely from alcohol abuse, continue to monitor, no signs of bleeding.          Prophylaxis:  DVT- lovenox  GI- none    Code Status: full code    Yari Martinez MD  [facility]

## 2024-08-18 NOTE — ED QUICK NOTES
Orders for admission, patient is aware of plan and ready to go upstairs. Any questions, please call ED RN Meaghan at extension [phone].     Patient Covid vaccination status: Unvaccinated     COVID Test Ordered in ED: None    COVID Suspicion at Admission: N/A    Running Infusions:  None    Mental Status/LOC at time of transport: A&Ox4    Other pertinent information:   CIWA score: 4   NIH score:  N/A

## 2024-08-18 NOTE — ED QUICK NOTES
Seizure precautions in place. Side rails up and padded. Stretcher in lowest position. Suction at bedside.

## 2024-08-18 NOTE — CONSULTS
[facility]  part of [facility]    Report of Consultation    Rodolfo Valdez Patient Status:  Inpatient    1980 MRN [id number]   Location [facility] 5SW/SE Attending Yari Martinez MD   Hosp Day # 0 PCP Jeff Anthony D'Amico, DO     Date of Admission:  2024  Date of Consult:  24   Reason for Consultation:   Patient presented with alcohol withdrawal, Yari Sotelo MD requested psychiatric consult for evaluation and advice.    Consult Duration     The patient seen for initial psychiatric consult evaluation.   Record reviewed, communication with attending, communication with RN and patient seen face to face evaluation.    History of Present Illness:   Patient is a 44 year old -American, single, male with past medical history of seizure disorder, alcohol abuse with history of alcohol withdrawal seizures who was admitted to the hospital for Alcohol withdrawal syndrome without complication (HCC): Patient indicated for psych consult for evaluation and advise.    Per chart review, the patient presented to the hospital by EMS with complaint of dizziness and weakness. He reports drinking alcohol daily otherwise last drink was four days ago.     The patient received the following psychotropic medications: ativan per [facility] protocol.     Labs and imaging reviewed: BAL negative,  , glucose 105, sodium 134, potassium 3.4    Most recent [facility] 5  Vital signs /96 HR 92    The patient seen today sitting in hospital bed. The patient presents calm, cooperative and pleasant. Minimal tremors observed.     The patient is alert and oriented to person, place, date and condition. The patient answers questions and engages in appropriate conversation with no impairment in cognition or distortion in thought process.     The patient reporting that he came to the hospital due to alcohol withdrawal, body pains and swollen feet.    He reports drinking 1/2 pint of whisky  daily. He reports drinking more on the weekends.    He states that he has never been to rehab. He notes that he has had intermittent periods of sobriety for a few months in the past. He reports history of withdrawal seizures. He has been drinking heavily since the age of 20.    The patient reporting that his last drink was five days ago. He states that he tried to quit on his own. He otherwise experienced too many withdrawal symptoms.     The patient reporting withdrawal symptoms today including shaky legs, weakness, restlessness and tremors. Minimal tremors observed.   He denies any hallucinations otherwise stating that yesterday he thought he heard something in his room.    The patient stating that he is motivated to maintain sobriety on his own.    The patient reporting that his mood is sometimes up and down. He reports some low mood, low energy with increased anxiety. He notes that he has a stressful job and that he does not like all of his coworkers.    The patient is not demonstrating any bianca or psychosis  The patient denies auditory or visual hallucinations  The patient denies suicidal or homicidal ideation.    The patient has been demonstrating symptoms of withdrawal with increased anxiety, restlessness, elevated vital signs. He otherwise reports that his last drink was five days ago. He states that he is motivated to maintain sobriety on his own. He endorses some low mood, low energy, increased anxiety, worry and impairment in his sleep. He is agreeable to start medications to help balance his mood and emotions.     Past Psychiatric/Medication History:  1. Prior diagnoses: denies  2. Past psychiatric inpatient: denies  3. Past outpatient history: denies  4. Past suicide history: denies  5. Medication history: denies    Social History:   Patient lives with his girlfriend. He does not have any children. He works at [employer] in [place] for the past year.   The patient drinks alcohol daily. He uses on  marijuana blunt every four days. He reports smoking 1 pack of cigarettes every four days.     Family History:  No family history reported.   Medical History:   Past Medical History  Past Medical History:    Seizure disorder (HCC)       Past Surgical History  History reviewed. No pertinent surgical history.    Family History  History reviewed. No pertinent family history.    Social History  Social History     Socioeconomic History    Marital status: Single   Tobacco Use    Smoking status: Former     Types: Cigarettes    Smokeless tobacco: Never   Vaping Use    Vaping status: Never Used   Substance and Sexual Activity    Alcohol use: Yes     Comment: sometimes, 3-4 days a week, half a pint of liquior and beer    Drug use: Yes     Types: Cannabis     Social Determinants of Health     Food Insecurity: No Food Insecurity (8/18/2024)    Food Insecurity     Food Insecurity: Never true   Transportation Needs: No Transportation Needs (8/18/2024)    Transportation Needs     Lack of Transportation: No   Housing Stability: Low Risk  (8/18/2024)    Housing Stability     Housing Instability: No           Current Medications:  Current Facility-Administered Medications   Medication Dose Route Frequency    levETIRAcetam (Keppra) tab 500 mg  500 mg Oral BID    HYDROcodone-acetaminophen (Norco) 5-325 MG per tab 1 tablet  1 tablet Oral Q6H PRN    sodium chloride 0.9% infusion   Intravenous Continuous    [START ON 8/19/2024] thiamine (Vitamin B1) tab 100 mg  100 mg Oral Daily    multivitamin with minerals (Thera M Plus) tab 1 tablet  1 tablet Oral Daily    folic acid (Folvite) tab 1 mg  1 mg Oral Daily    ondansetron (Zofran) 4 MG/2ML injection 4 mg  4 mg Intravenous Q6H PRN    prochlorperazine (Compazine) 10 MG/2ML injection 10 mg  10 mg Intravenous Q6H PRN    docusate sodium (Colace) cap 100 mg  100 mg Oral BID    magnesium hydroxide (Milk of Magnesia) 400 MG/5ML oral suspension 30 mL  30 mL Oral Daily PRN    fleet enema (Fleet) 7-19  GM/118ML rectal enema 133 mL  1 enema Rectal Once PRN    enoxaparin (Lovenox) 40 MG/0.4ML SUBQ injection 40 mg  40 mg Subcutaneous Daily    LORazepam (Ativan) tab 1 mg  1 mg Oral Q1H PRN    Or    LORazepam (Ativan) tab 2 mg  2 mg Oral Q1H PRN    polyethylene glycol (PEG 3350) (Miralax) 17 g oral packet 17 g  17 g Oral Daily PRN    sennosides (Senokot) tab 17.2 mg  17.2 mg Oral Nightly PRN    bisacodyl (Dulcolax) 10 MG rectal suppository 10 mg  10 mg Rectal Daily PRN    prochlorperazine (Compazine) 10 MG/2ML injection 5 mg  5 mg Intravenous Q8H PRN    multivitamin (Tab-A-Ranulfo/Beta Carotene) tab 1 tablet  1 tablet Oral Once     Medications Prior to Admission   Medication Sig    cyanocobalamin 1000 MCG Oral Tab Take 1 tablet (1,000 mcg total) by mouth daily.    folic acid 1 MG Oral Tab Take 1 tablet (1 mg total) by mouth daily.    HYDROcodone-acetaminophen 5-325 MG Oral Tab Take 1 tablet by mouth every 6 (six) hours as needed.    thiamine 100 MG Oral Tab Take 1 tablet (100 mg total) by mouth in the morning, at noon, and at bedtime.    levETIRAcetam 500 MG Oral Tab Take 1 tablet (500 mg total) by mouth 2 (two) times daily.    lidocaine-menthol 4-1 % External Patch Place 1 patch onto the skin daily. (Patient not taking: Reported on 8/15/2024)       Allergies  Allergies   Allergen Reactions    Penicillins ANGIOEDEMA       Review of Systems:   As by Admitting/Attending    Results:   Laboratory Data:  Lab Results   Component Value Date    WBC 5.2 08/17/2024    HGB 13.3 08/17/2024    HCT 37.7 (L) 08/17/2024    PLT 38.0 (L) 08/17/2024    CREATSERUM 0.70 08/17/2024    BUN 7 (L) 08/17/2024     (L) 08/17/2024    K 3.4 (L) 08/17/2024    CL 91 (L) 08/17/2024    CO2 23.0 08/17/2024     (H) 08/17/2024    CA 9.5 08/17/2024    ALB 4.5 08/17/2024    ALKPHO 223 (H) 08/17/2024    TP 7.9 08/17/2024     (H) 08/17/2024     (H) 08/17/2024    PTT 26.5 04/27/2024    INR 1.04 04/27/2024    PTP 14.3 04/27/2024    T4F  1.0 03/02/2024    TSH 5.570 (H) 03/02/2024    WINDY 60 01/13/2023    LIP 78 (H) 04/27/2024    ESRML 80 (H) 01/16/2023    CRP 3.74 (H) 01/16/2023    MG 1.8 04/29/2024    PHOS 2.8 04/29/2024    PHOS 2.8 04/29/2024    B12 538 03/04/2024    ETOH <3 08/17/2024         Imaging:  US GALLBLADDER (CPT=76705)    Result Date: 8/18/2024  CONCLUSION:   1. No acute cholecystitis or cholelithiasis.  Mild gallbladder wall thickening, which is likely secondary to underlying hepatic dysfunction. 2. No significant change in the dilatation of the common bile duct measuring up to 11 mm.  Recommend correlation with liver function tests and consider further evaluation with an MRI/MRCP.  3. Cirrhosis and hepatomegaly. 4. Redemonstrated heterogeneous echogenicity of the pancreas.   No significant change when compared to the preliminary radiology report from [facility] radiology.   Dictated by (CST): Attila Patino MD on 8/18/2024 at 7:59 AM     Finalized by (CST): Attila Patino MD on 8/18/2024 at 8:03 AM           Vital Signs:   Blood pressure (!) 132/96, pulse 92, temperature 98.5 °F (36.9 °C), temperature source Oral, resp. rate 19, height 5' 11\" (1.803 m), weight 71.1 kg (156 lb 12.8 oz), SpO2 91%.    Mental Status Exam:   Appearance: Stated age male, in hospital gown, laying down in hospital bed.  Psychomotor: No psychomotor agitation, or retardation. Minimal tremors observed.  Orientation: Alert and oriented to person, place, time and condition.  Gait: Not evaluated.  Attitude/Coorperation: Cooperative and attentive.  Behavior: Appropriate.  Speech: Regular rate and rhythm speech.  Mood: Patient reporting depressed mood.  Affect: Congruent with the mood.  Thought process: Linear and appropriate.  Thought content: Patient denies any suicidal or homicidal ideation.  Perceptions: Patient denies any auditory or visual hallucinations.  Concentration: Grossly intact.  Memory: Grossly intact.  Intellect: Average.  Judgment and Insight:  Questionable.     Impression:     Alcohol withdrawal syndrome without complication (HCC)  Episodic mood disorder  Hyperbilirubinemia    Patient is a 44 year old -American, single, male with past medical history of seizure disorder, alcohol abuse with history of alcohol withdrawal seizures who was admitted to the hospital for Alcohol withdrawal syndrome without complication (HCC):     The patient has been demonstrating symptoms of withdrawal with increased anxiety, restlessness, elevated vital signs. He otherwise reports that his last drink was five days ago. He states that he is motivated to maintain sobriety on his own. He endorses some low mood, low energy, increased anxiety, worry and impairment in his sleep. He is agreeable to start medications to help balance his mood and emotions.     Discussed risk and benefit, acknowledging the current symptom and severity.  At this point, I would recommend the following approach:     Focus on safety  Focus on education and support.  Focus on insight orientation helping the patient understand diagnosis and treatment plan.  Continue CIWA protocol  Start Remeron 7.5 mg nightly  Processed with patient at length, the initiation of the above psychotropic medications I advised the patient of the risks, benefits, alternatives and potential side effects. The patient consents to administration of the medications and understands the right to refuse medications at any time. The patient verbalized understanding.   Coordinate plan with team    Orders This Visit:  Orders Placed This Encounter   Procedures    CBC With Differential With Platelet    Comp Metabolic Panel (14)    Ethyl Alcohol    Levetiracetam, Serum    RBC Morphology Scan    Scan slide    Potassium    Comp Metabolic Panel (14)    Lipid Panel    Magnesium    Phosphorus    CBC With Differential With Platelet       Meds This Visit:  Requested Prescriptions      No prescriptions requested or ordered in this encounter        Karely Melvin, APRN  8/18/2024    Note to Patient: The 21st Century Cures Act makes medical notes like these available to patients in the interest of transparency. However, be advised this is a medical document. It is intended as peer to peer communication. It is written in medical language and may contain abbreviations or verbiage that are unfamiliar. It may appear blunt or direct. Medical documents are intended to carry relevant information, facts as evident, and the clinical opinion of the practitioner. This note may have been transcribed using a voice dictation system. Voice recognition errors may occur. This should not be taken to alter the content or meaning of this note.

## 2024-08-18 NOTE — ED PROVIDER NOTES
[facility] Emergency Department Note  Patient: Rodolfo Valdez Age: 44 year old Sex: male      MRN: [id number]  : 1980    Patient Seen in: [facility] Emergency Department    History     Chief Complaint   Patient presents with    Eval-D     Stated Complaint:     History obtained from: Patient    Patient is a 44-year-old male with a past medical history of seizure disorder on Keppra, alcohol use with history of alcohol withdrawal seizures presenting today for evaluation of tremors, nausea, vomiting, abdominal pain.  He states that his last drink was approximately 4 days ago.  He states that over the past 4 days, has been feeling tremulous and nauseous.  He states that he started having generalized abdominal pain and vomiting.  He states that he feels lightheaded and has paresthesias of bilateral feet.  States that he is trying to quit drinking.  States that he ran out of his Keppra yesterday.  Last seizure was in 2024.    Review of Systems:  Review of Systems  Positive for stated complaint: . Constitutional and vital signs reviewed. All other systems reviewed and negative except as noted above.    Patient History:  Past Medical History:    Seizure disorder (HCC)       History reviewed. No pertinent surgical history.     No family history on file.    Specific Social Determinants of Health:   Social History     Socioeconomic History    Marital status: Single   Tobacco Use    Smoking status: Former     Types: Cigarettes    Smokeless tobacco: Never   Vaping Use    Vaping status: Never Used   Substance and Sexual Activity    Alcohol use: Yes     Comment: sometimes, 3-4 days a week, half a pint of liquior and beer    Drug use: Yes     Types: Cannabis     Social Determinants of Health     Food Insecurity: No Food Insecurity (2024)    Food Insecurity     Food Insecurity: Never true   Transportation Needs: No Transportation Needs (2024)    Transportation Needs     Lack of Transportation: No    Housing Stability: Low Risk  (4/27/2024)    Housing Stability     Housing Instability: No           PSFH elements reviewed from today and agreed except as otherwise stated in HPI.    Physical Exam     ED Triage Vitals [08/17/24 1830]   /90   Pulse 97   Resp 23   Temp 98.7 °F (37.1 °C)   Temp src Temporal   SpO2 98 %   O2 Device None (Room air)       Current:/88   Pulse 98   Temp 98.7 °F (37.1 °C) (Temporal)   Resp 18   Ht 180.3 cm (5' 11\")   Wt 77.1 kg   SpO2 94%   BMI 23.71 kg/m²         Physical Exam  Constitutional:       Appearance: He is well-developed.   HENT:      Head: Normocephalic and atraumatic.      Right Ear: External ear normal.      Left Ear: External ear normal.      Nose: Nose normal.   Eyes:      Conjunctiva/sclera: Conjunctivae normal.      Pupils: Pupils are equal, round, and reactive to light.   Cardiovascular:      Rate and Rhythm: Regular rhythm. Tachycardia present.      Heart sounds: Normal heart sounds.   Pulmonary:      Effort: Pulmonary effort is normal.      Breath sounds: Normal breath sounds.   Abdominal:      General: Bowel sounds are normal.      Palpations: Abdomen is soft.      Tenderness: There is abdominal tenderness.      Comments: Generalized abdominal tenderness with no rebound or guarding   Musculoskeletal:         General: Normal range of motion.      Cervical back: Normal range of motion and neck supple.      Right lower leg: No edema.      Left lower leg: No edema.   Skin:     General: Skin is warm and dry.      Findings: No rash.   Neurological:      General: No focal deficit present.      Mental Status: He is alert and oriented to person, place, and time.      Deep Tendon Reflexes: Reflexes are normal and symmetric.      Comments: Tremulous   Psychiatric:         Mood and Affect: Mood normal.         Behavior: Behavior normal.         ED Course   Labs:   Labs Reviewed   CBC WITH DIFFERENTIAL WITH PLATELET - Abnormal; Notable for the following  components:       Result Value    HCT 37.7 (*)     RDW-SD 52.4 (*)     RDW 18.0 (*)     PLT 38.0 (*)     All other components within normal limits   COMP METABOLIC PANEL (14) - Abnormal; Notable for the following components:    Glucose 105 (*)     Sodium 134 (*)     Potassium 3.4 (*)     Chloride 91 (*)     Anion Gap 20 (*)     BUN 7 (*)      (*)      (*)     Alkaline Phosphatase 223 (*)     Bilirubin, Total 3.7 (*)     All other components within normal limits   RBC MORPHOLOGY SCAN - Abnormal; Notable for the following components:    RBC Morphology See morphology below (*)     All other components within normal limits   ETHYL ALCOHOL - Normal   SCAN SLIDE   LEVETIRACETAM, S   RAINBOW DRAW LAVENDER   RAINBOW DRAW LIGHT GREEN   RAINBOW DRAW BLUE   RAINBOW DRAW GOLD     Radiology findings:  I personally reviewed the images.   No results found.    Ultrasound abdomen right upper quadrant  IMPRESSION:  -Mildly distended gallbladder, no sonographic evidence of cholelithiasis or acute cholecystitis.  -Enlarged common bile duct measuring up to 1.1 cm in diameter, similar to prior ultrasound from 3/3/2024.  No sonographic evidence of obstruction, however consider correlation with serum biliary enzymes and/or MRCP for more sensitive evaluation if clinically indicated.  -Hepatic steatosis.  -Heterogenous echotexture of the pancreatic head, of uncertain significance possibly due to prior pancreatitis and appears similar compared to prior ultrasound from 3/3/2024.  -Normal sonographic appearance of the right kidney.    EKG as interpreted by me: Ventricular rate 95, normal sinus rhythm, normal axis, no parable prolongation, narrow QRS, QTc 472 ms, no ST segment elevations or depressions, no abnormal T wave inversions  Cardiac Monitor: Interpreted by me.   Pulse Readings from Last 1 Encounters:   08/18/24 98   , sinus,     External non-ED records reviewed independently by me: CT brain from 4/27/2024 reviewed showing  no evidence of acute intracranial abnormality.  Note stable encephalomalacia of the right frontal lobe.    MDM   44-year-old male with a past medical history of seizure disorder on Keppra, alcohol use with history of alcohol withdrawal seizures presenting for evaluation of tremors, nausea, vomiting, and generalized abdominal pain over the past 4 days since his last drink of alcohol.  Upon arrival to emergency department, he is borderline tachycardic but otherwise hemodynamically stable.  Tremulous with generalized abdominal tenderness.    Differential diagnoses considered includes, but is not limited to: Alcohol withdrawal, alcoholic hepatitis, cirrhosis, electrolyte or metabolic derangement    Will obtain the following tests: CBC, CMP, alcohol level, Keppra level, gallbladder ultrasound, EKG  Please see ED course for my independent review of these tests/imaging results.    Initial Medications/Therapeutics administered: IV fluids, folic acid, multivitamin, thiamine, Pepcid, Zofran, home Keppra dose, oral Ativan, Librium    Chronic conditions affecting care: Seizure disorder on Keppra, alcohol use disorder with history of alcohol withdrawal seizures    Workup and medications considered but not ordered: Considered phenobarbital however patient's CIWA scores remained less than 10    Social Determinants of Health that impacted care: None    ED Course as of 08/18/24 0311  ------------------------------------------------------------  Time: 08/18 0139  Comment: Labs overall consistent with chronic alcohol use.  AST is 600.  ALT of 147.  Total bilirubin is elevated at 3.7.  Has a history of hyperbilirubinemia likely secondary to alcoholic hepatic injury.  Thrombocytopenia likely secondary to chronic alcohol use.  He has no evidence of spontaneous bleeding.  Independently reviewed the gallbladder ultrasound images that show no evidence of gallstones.  Agree with radiology read above.  Upon reevaluation, CIWA scores improving  however still mildly elevated.  Discussed continued treatment of alcohol withdrawal.  Given persistent symptoms, plan to admit for further management. I confirmed patient is not an active patient of Dr. D'Amico's. Will admit to on call internal medicine. I endorsed the patient's case to Dr. Martinez for admission.  Psychiatry placed on consult for substance use evaluation.        Disposition and Plan     Clinical Impression:  1. Alcohol withdrawal syndrome without complication (HCC)    2. Hyperbilirubinemia        Disposition:  Admit    Follow-up:  No follow-up provider specified.    Medications Prescribed:  Current Discharge Medication List          Hospital Problems       Present on Admission  Date Reviewed: 2/6/2023            ICD-10-CM Noted POA    * (Principal) Alcohol withdrawal syndrome without complication (HCC) F10.930 8/18/2024 Unknown        This note may have been created using voice dictation technology and may include inadvertent errors.      Roz Friend MD  Emergency Medicine

## 2024-08-18 NOTE — PLAN OF CARE
Problem: Patient Centered Care  Goal: Patient preferences are identified and integrated in the patient's plan of care  Description: Interventions:  - What would you like us to know as we care for you? From home with partner  - Provide timely, complete, and accurate information to patient/family  - Incorporate patient and family knowledge, values, beliefs, and cultural backgrounds into the planning and delivery of care  - Encourage patient/family to participate in care and decision-making at the level they choose  - Honor patient and family perspectives and choices  Outcome: Progressing     Problem: Patient/Family Goals  Goal: Patient/Family Long Term Goal  Description: Patient's Long Term Goal:     Interventions:  - Monitor vitals  - Monitor appropriate labs  - Administer medications as ordered  - Follow MD's orders  - Update patient on plan of care   - Discharge planning     - See additional Care Plan goals for specific interventions  Outcome: Progressing  Goal: Patient/Family Short Term Goal  Description: Patient's Short Term Goal:     Interventions:   - Monitor vitals  - Monitor appropriate labs  - Administer medications as ordered  - Follow MD's orders  - Update patient on plan of care   - Discharge planning     - See additional Care Plan goals for specific interventions  Outcome: Progressing     Problem: GASTROINTESTINAL - ADULT  Goal: Minimal or absence of nausea and vomiting  Description: INTERVENTIONS:  - Maintain adequate hydration with IV or PO as ordered and tolerated  - Nasogastric tube to low intermittent suction as ordered  - Evaluate effectiveness of ordered antiemetic medications  - Provide nonpharmacologic comfort measures as appropriate  - Advance diet as tolerated, if ordered  - Obtain nutritional consult as needed  - Evaluate fluid balance  Outcome: Progressing     Problem: NEUROLOGICAL - ADULT  Goal: Absence of seizures  Description: INTERVENTIONS  - Monitor for seizure activity  - Administer  anti-seizure medications as ordered  - Monitor neurological status  Outcome: Progressing     Problem: PAIN - ADULT  Goal: Verbalizes/displays adequate comfort level or patient's stated pain goal  Description: INTERVENTIONS:  - Encourage pt to monitor pain and request assistance  - Assess pain using appropriate pain scale  - Administer analgesics based on type and severity of pain and evaluate response  - Implement non-pharmacological measures as appropriate and evaluate response  - Consider cultural and social influences on pain and pain management  - Manage/alleviate anxiety  - Utilize distraction and/or relaxation techniques  - Monitor for opioid side effects  - Notify MD/LIP if interventions unsuccessful or patient reports new pain  - Anticipate increased pain with activity and pre-medicate as appropriate  Outcome: Progressing     Problem: SAFETY ADULT - FALL  Goal: Free from fall injury  Description: INTERVENTIONS:  - Assess pt frequently for physical needs  - Identify cognitive and physical deficits and behaviors that affect risk of falls.  - [place] fall precautions as indicated by assessment.  - Educate pt/family on patient safety including physical limitations  - Instruct pt to call for assistance with activity based on assessment  - Modify environment to reduce risk of injury  - Provide assistive devices as appropriate  - Consider OT/PT consult to assist with strengthening/mobility  - Encourage toileting schedule  Outcome: Progressing     Problem: DISCHARGE PLANNING  Goal: Discharge to home or other facility with appropriate resources  Description: INTERVENTIONS:  - Identify barriers to discharge w/pt and caregiver  - Include patient/family/discharge partner in discharge planning  - Arrange for needed discharge resources and transportation as appropriate  - Identify discharge learning needs (meds, wound care, etc)  - Arrange for interpreters to assist at discharge as needed  - Consider post-discharge  preferences of patient/family/discharge partner  - Complete POLST form as appropriate  - Assess patient's ability to be responsible for managing their own health  - Refer to Case Management Department for coordinating discharge planning if the patient needs post-hospital services based on physician/LIP order or complex needs related to functional status, cognitive ability or social support system  Outcome: Progressing     Problem: ANXIETY  Goal: Will report anxiety at manageable levels  Description: INTERVENTIONS:  - Administer medication as ordered  - Teach and rehearse alternative coping skills  - Provide emotional support with 1:1 interaction with staff  Outcome: Progressing       CIWA protocol continued. Safety and fall precautions in place, call light within reach.

## 2024-08-19 VITALS
DIASTOLIC BLOOD PRESSURE: 95 MMHG | HEIGHT: 71 IN | RESPIRATION RATE: 16 BRPM | OXYGEN SATURATION: 98 % | TEMPERATURE: 99 F | SYSTOLIC BLOOD PRESSURE: 124 MMHG | HEART RATE: 110 BPM | BODY MASS INDEX: 21.95 KG/M2 | WEIGHT: 156.81 LBS

## 2024-08-19 LAB
ALBUMIN SERPL-MCNC: 3.8 G/DL (ref 3.2–4.8)
ALBUMIN/GLOB SERPL: 1.3 {RATIO} (ref 1–2)
ALP LIVER SERPL-CCNC: 201 U/L
ALT SERPL-CCNC: 149 U/L
ANION GAP SERPL CALC-SCNC: 8 MMOL/L (ref 0–18)
AST SERPL-CCNC: 475 U/L (ref ?–34)
ATRIAL RATE: 95 BPM
BASOPHILS # BLD AUTO: 0.01 X10(3) UL (ref 0–0.2)
BASOPHILS NFR BLD AUTO: 0.2 %
BILIRUB SERPL-MCNC: 3 MG/DL (ref 0.3–1.2)
BUN BLD-MCNC: <5 MG/DL (ref 9–23)
CALCIUM BLD-MCNC: 9.6 MG/DL (ref 8.7–10.4)
CHLORIDE SERPL-SCNC: 102 MMOL/L (ref 98–112)
CHOLEST SERPL-MCNC: 153 MG/DL (ref ?–200)
CO2 SERPL-SCNC: 25 MMOL/L (ref 21–32)
CREAT BLD-MCNC: 0.73 MG/DL
DEPRECATED RDW RBC AUTO: 53.4 FL (ref 35.1–46.3)
EGFRCR SERPLBLD CKD-EPI 2021: 116 ML/MIN/1.73M2 (ref 60–?)
EOSINOPHIL # BLD AUTO: 0.11 X10(3) UL (ref 0–0.7)
EOSINOPHIL NFR BLD AUTO: 2 %
ERYTHROCYTE [DISTWIDTH] IN BLOOD BY AUTOMATED COUNT: 17.5 % (ref 11–15)
GLOBULIN PLAS-MCNC: 2.9 G/DL (ref 2–3.5)
GLUCOSE BLD-MCNC: 187 MG/DL (ref 70–99)
HCT VFR BLD AUTO: 34.4 %
HDLC SERPL-MCNC: 62 MG/DL (ref 40–59)
HGB BLD-MCNC: 12.6 G/DL
IMM GRANULOCYTES # BLD AUTO: 0.02 X10(3) UL (ref 0–1)
IMM GRANULOCYTES NFR BLD: 0.4 %
LDLC SERPL CALC-MCNC: 79 MG/DL (ref ?–100)
LYMPHOCYTES # BLD AUTO: 1.72 X10(3) UL (ref 1–4)
LYMPHOCYTES NFR BLD AUTO: 31.7 %
MAGNESIUM SERPL-MCNC: 1.4 MG/DL (ref 1.6–2.6)
MCH RBC QN AUTO: 30.6 PG (ref 26–34)
MCHC RBC AUTO-ENTMCNC: 36.6 G/DL (ref 31–37)
MCV RBC AUTO: 83.5 FL
MONOCYTES # BLD AUTO: 0.55 X10(3) UL (ref 0.1–1)
MONOCYTES NFR BLD AUTO: 10.1 %
NEUTROPHILS # BLD AUTO: 3.01 X10 (3) UL (ref 1.5–7.7)
NEUTROPHILS # BLD AUTO: 3.01 X10(3) UL (ref 1.5–7.7)
NEUTROPHILS NFR BLD AUTO: 55.6 %
NONHDLC SERPL-MCNC: 91 MG/DL (ref ?–130)
P AXIS: 79 DEGREES
P-R INTERVAL: 128 MS
PHOSPHATE SERPL-MCNC: 0.6 MG/DL (ref 2.4–5.1)
PLATELET # BLD AUTO: 30 10(3)UL (ref 150–450)
PLATELETS.RETICULATED NFR BLD AUTO: 28 % (ref 0–7)
POTASSIUM SERPL-SCNC: 3.9 MMOL/L (ref 3.5–5.1)
POTASSIUM SERPL-SCNC: 3.9 MMOL/L (ref 3.5–5.1)
PROT SERPL-MCNC: 6.7 G/DL (ref 5.7–8.2)
Q-T INTERVAL: 376 MS
QRS DURATION: 82 MS
QTC CALCULATION (BEZET): 472 MS
R AXIS: 66 DEGREES
RBC # BLD AUTO: 4.12 X10(6)UL
SODIUM SERPL-SCNC: 135 MMOL/L (ref 136–145)
T AXIS: 78 DEGREES
TRIGL SERPL-MCNC: 57 MG/DL (ref 30–149)
VENTRICULAR RATE: 95 BPM
VLDLC SERPL CALC-MCNC: 9 MG/DL (ref 0–30)
WBC # BLD AUTO: 5.4 X10(3) UL (ref 4–11)

## 2024-08-19 PROCEDURE — 99232 SBSQ HOSP IP/OBS MODERATE 35: CPT | Performed by: NURSE PRACTITIONER

## 2024-08-19 RX ORDER — LORAZEPAM 1 MG/1
1 TABLET ORAL ONCE
Status: DISCONTINUED | OUTPATIENT
Start: 2024-08-19 | End: 2024-08-19

## 2024-08-19 RX ORDER — MIRTAZAPINE 7.5 MG/1
7.5 TABLET, FILM COATED ORAL NIGHTLY
Qty: 30 TABLET | Refills: 0 | Status: SHIPPED | OUTPATIENT
Start: 2024-08-19

## 2024-08-19 RX ORDER — LEVETIRACETAM 500 MG/1
500 TABLET ORAL 2 TIMES DAILY
Qty: 60 TABLET | Refills: 0 | Status: SHIPPED | OUTPATIENT
Start: 2024-08-19

## 2024-08-19 NOTE — DISCHARGE SUMMARY
[facility]     Discharge Summary    Admit date: 8/17/2024    Discharge date: 8/19/2024    Primary care provider: [unfilled]    Discharge Physician: Yari Martinez MD, MD    Final Diagnoses:  Alcohol abuse and withdrawal    Secondary Diagnosis:  Transamintis     Consultations:  none    Operations/Procedures:  none    HPI & Hospital Course:   Alcohol abuse: Monitor withdrawal symptoms, thiamine, folic acid, multivitamin, CIWA protocol.  Hyponatremia: Continue IV fluids.  Hypokalemia: Replacement protocol.  Transaminitis: Elevated AST and ALT likely due to alcohol abuse, continue to monitor.  Hypomagnesemia: Replacement protocol.  Thrombocytopenia likely from alcohol abuse, continue to monitor, no signs of bleeding.          Disposition: to home in stable condition     Patient Instructions:     Discharge Meds  Current Discharge Medication List        START taking these medications    Details   mirtazapine 7.5 MG Oral Tab Take 1 tablet (7.5 mg total) by mouth nightly.  Qty: 30 tablet, Refills: 0           CONTINUE these medications which have CHANGED    Details   levETIRAcetam 500 MG Oral Tab Take 1 tablet (500 mg total) by mouth 2 (two) times daily.  Qty: 60 tablet, Refills: 0           CONTINUE these medications which have NOT CHANGED    Details   cyanocobalamin 1000 MCG Oral Tab Take 1 tablet (1,000 mcg total) by mouth daily.  Qty: 30 tablet, Refills: 0      folic acid 1 MG Oral Tab Take 1 tablet (1 mg total) by mouth daily.  Qty: 30 tablet, Refills: 0      HYDROcodone-acetaminophen 5-325 MG Oral Tab Take 1 tablet by mouth every 6 (six) hours as needed.  Qty: 15 tablet, Refills: 0    Associated Diagnoses: Neuropathy, alcoholic (HCC)      thiamine 100 MG Oral Tab Take 1 tablet (100 mg total) by mouth in the morning, at noon, and at bedtime.  Qty: 30 tablet, Refills: 0      lidocaine-menthol 4-1 % External Patch Place 1 patch onto the skin daily.  Qty: 15 patch, Refills: 0             [unfilled]        Follow-up     pcp    Discharge instructions reviewed. Patient  agrees and understands. All questions answered.    Total time spent: 35 mins    [facility]

## 2024-08-19 NOTE — PROGRESS NOTES
Patient is a 44 year old -American, single, male with past medical history of seizure disorder, alcohol abuse with history of alcohol withdrawal seizures who was admitted to the hospital for Alcohol withdrawal syndrome without complication (HCC): Patient indicated for psych consult for evaluation and advise.  Consult Duration   The patient seen for over 50-minute, follow-up evaluation, over 50% counseling and coordinating care addressing alcohol withdrawal.    Record reviewed, communication with attending, communication with RN and patient seen face to face evaluation.    History of Present Illness:     Communicating with the team, no issues reported     The patient received the following psychotropic medications:Remeron 7.5 mg,  no use of ativan per [facility] protocol.     Labs and imaging reviewed: glucose 105, sodium 134, AST 617m     Most recent [facility] 2  Vital signs /99 HR 94    The patient seen today sitting in hospital bed.     The patient presents calm, cooperative and pleasant. No tremors observed.      The patient is alert and oriented to person, place, date and condition. The patient answers questions and engages in appropriate conversation with no impairment in cognition or distortion in thought process.     The patient reporting that he came to the hospital due to alcohol withdrawal, body pains and swollen feet. He reports that he is feeling better today and ready to go home. He denies any symptoms of withdrawal.     The patient stating that he is motivated to maintain sobriety on his own. He otherwise is interested in resources.     The patient reporting that his mood is sometimes up and down. He reports some low mood, low energy with increased anxiety. Patient was started on Remeron yesterday. He denies any issues or side effects. He is agreeable to taking.     The patient is not demonstrating any bianca or psychosis  The patient denies auditory or visual hallucinations  The patient denies  suicidal or homicidal ideation.    Patient has been demonstrating minimal withdrawal symptoms. Vital signs stable. CIWA scores low. Patient is appropriate for discharge. Referrals provided by liaison in discharge instructions.     Past Psychiatric/Medication History:  1. Prior diagnoses: denies  2. Past psychiatric inpatient: denies  3. Past outpatient history: denies  4. Past suicide history: denies  5. Medication history: denies    Social History:   Patient lives with his girlfriend. He does not have any children. He works at [employer] in [place] for the past year.   The patient drinks alcohol daily. He uses on marijuana blunt every four days. He reports smoking 1 pack of cigarettes every four days.     Family History:  No family history reported.   Medical History:   Past Medical History  Past Medical History:    Seizure disorder (HCC)       Past Surgical History  History reviewed. No pertinent surgical history.    Family History  History reviewed. No pertinent family history.    Social History  Social History     Socioeconomic History    Marital status: Single   Tobacco Use    Smoking status: Former     Types: Cigarettes    Smokeless tobacco: Never   Vaping Use    Vaping status: Never Used   Substance and Sexual Activity    Alcohol use: Yes     Comment: sometimes, 3-4 days a week, half a pint of liquior and beer    Drug use: Yes     Types: Cannabis     Social Determinants of Health     Food Insecurity: No Food Insecurity (8/18/2024)    Food Insecurity     Food Insecurity: Never true   Transportation Needs: No Transportation Needs (8/18/2024)    Transportation Needs     Lack of Transportation: No   Housing Stability: Low Risk  (8/18/2024)    Housing Stability     Housing Instability: No           Current Medications:  Current Facility-Administered Medications   Medication Dose Route Frequency    levETIRAcetam (Keppra) tab 500 mg  500 mg Oral BID    HYDROcodone-acetaminophen (Norco) 5-325 MG per tab 1 tablet  1  tablet Oral Q6H PRN    sodium chloride 0.9% infusion   Intravenous Continuous    thiamine (Vitamin B1) tab 100 mg  100 mg Oral Daily    multivitamin with minerals (Thera M Plus) tab 1 tablet  1 tablet Oral Daily    folic acid (Folvite) tab 1 mg  1 mg Oral Daily    ondansetron (Zofran) 4 MG/2ML injection 4 mg  4 mg Intravenous Q6H PRN    docusate sodium (Colace) cap 100 mg  100 mg Oral BID    magnesium hydroxide (Milk of Magnesia) 400 MG/5ML oral suspension 30 mL  30 mL Oral Daily PRN    fleet enema (Fleet) 7-19 GM/118ML rectal enema 133 mL  1 enema Rectal Once PRN    [Held by provider] enoxaparin (Lovenox) 40 MG/0.4ML SUBQ injection 40 mg  40 mg Subcutaneous Daily    polyethylene glycol (PEG 3350) (Miralax) 17 g oral packet 17 g  17 g Oral Daily PRN    sennosides (Senokot) tab 17.2 mg  17.2 mg Oral Nightly PRN    bisacodyl (Dulcolax) 10 MG rectal suppository 10 mg  10 mg Rectal Daily PRN    prochlorperazine (Compazine) 10 MG/2ML injection 5 mg  5 mg Intravenous Q8H PRN    mirtazapine (Remeron) tab 7.5 mg  7.5 mg Oral Nightly    multivitamin (Tab-A-Ranulfo/Beta Carotene) tab 1 tablet  1 tablet Oral Once     Medications Prior to Admission   Medication Sig    cyanocobalamin 1000 MCG Oral Tab Take 1 tablet (1,000 mcg total) by mouth daily.    folic acid 1 MG Oral Tab Take 1 tablet (1 mg total) by mouth daily.    HYDROcodone-acetaminophen 5-325 MG Oral Tab Take 1 tablet by mouth every 6 (six) hours as needed.    thiamine 100 MG Oral Tab Take 1 tablet (100 mg total) by mouth in the morning, at noon, and at bedtime.    lidocaine-menthol 4-1 % External Patch Place 1 patch onto the skin daily.    [DISCONTINUED] levETIRAcetam 500 MG Oral Tab Take 1 tablet (500 mg total) by mouth 2 (two) times daily.       Allergies  Allergies   Allergen Reactions    Penicillins ANGIOEDEMA       Review of Systems:   As by Admitting/Attending    Results:   Laboratory Data:  Lab Results   Component Value Date    WBC 5.2 08/17/2024    HGB 13.3  08/17/2024    HCT 37.7 (L) 08/17/2024    PLT 38.0 (L) 08/17/2024    CREATSERUM 0.70 08/17/2024    BUN 7 (L) 08/17/2024     (L) 08/17/2024    K 3.4 (L) 08/17/2024    CL 91 (L) 08/17/2024    CO2 23.0 08/17/2024     (H) 08/17/2024    CA 9.5 08/17/2024    ALB 4.5 08/17/2024    ALKPHO 223 (H) 08/17/2024    TP 7.9 08/17/2024     (H) 08/17/2024     (H) 08/17/2024    PTT 26.5 04/27/2024    INR 1.04 04/27/2024    PTP 14.3 04/27/2024    T4F 1.0 03/02/2024    TSH 5.570 (H) 03/02/2024    WINDY 60 01/13/2023    LIP 78 (H) 04/27/2024    ESRML 80 (H) 01/16/2023    CRP 3.74 (H) 01/16/2023    MG 1.2 (L) 08/17/2024    PHOS 2.8 04/29/2024    PHOS 2.8 04/29/2024    B12 538 03/04/2024    ETOH <3 08/17/2024         Imaging:  US GALLBLADDER (CPT=76705)    Result Date: 8/18/2024  CONCLUSION:   1. No acute cholecystitis or cholelithiasis.  Mild gallbladder wall thickening, which is likely secondary to underlying hepatic dysfunction. 2. No significant change in the dilatation of the common bile duct measuring up to 11 mm.  Recommend correlation with liver function tests and consider further evaluation with an MRI/MRCP.  3. Cirrhosis and hepatomegaly. 4. Redemonstrated heterogeneous echogenicity of the pancreas.   No significant change when compared to the preliminary radiology report from [facility].   Dictated by (CST): Attila Patino MD on 8/18/2024 at 7:59 AM     Finalized by (CST): Attila Patino MD on 8/18/2024 at 8:03 AM           Vital Signs:   Blood pressure (!) 137/99, pulse 94, temperature 98.6 °F (37 °C), temperature source Oral, resp. rate 18, height 5' 11\" (1.803 m), weight 71.1 kg (156 lb 12.8 oz), SpO2 97%.    Mental Status Exam:   Appearance: Stated age male, in hospital gown, laying down in hospital bed.  Psychomotor: No psychomotor agitation, or retardation. No tremors observed.  Orientation: Alert and oriented to person, place, time and condition.  Gait: Not  evaluated.  Attitude/Coorperation: Cooperative and attentive.  Behavior: Appropriate.  Speech: Regular rate and rhythm speech.  Mood: Patient reporting depressed mood.  Affect: Congruent with the mood.  Thought process: Linear and appropriate.  Thought content: Patient denies any suicidal or homicidal ideation.  Perceptions: Patient denies any auditory or visual hallucinations.  Concentration: Grossly intact.  Memory: Grossly intact.  Intellect: Average.  Judgment and Insight: Questionable.     Impression:     Alcohol withdrawal syndrome without complication (HCC)  Episodic mood disorder  Hyperbilirubinemia    Patient is a 44 year old -American, single, male with past medical history of seizure disorder, alcohol abuse with history of alcohol withdrawal seizures who was admitted to the hospital for Alcohol withdrawal syndrome without complication (HCC):     The patient has been demonstrating symptoms of withdrawal with increased anxiety, restlessness, elevated vital signs. He otherwise reports that his last drink was five days ago. He states that he is motivated to maintain sobriety on his own. He endorses some low mood, low energy, increased anxiety, worry and impairment in his sleep. He is agreeable to start medications to help balance his mood and emotions.     8/19/2024: Patient has been demonstrating minimal withdrawal symptoms. Vital signs stable. CIWA scores low. Patient is appropriate for discharge. Referrals provided by liaison in discharge instructions.     Discussed risk and benefit, acknowledging the current symptom and severity.  At this point, I would recommend the following approach:     Focus on safety  Focus on education and support.  Focus on insight orientation helping the patient understand diagnosis and treatment plan.  Discontinue CIWA protocol  Continue Remeron 7.5 mg nightly  Processed with patient at length, the initiation of the above psychotropic medications I advised the patient of  the risks, benefits, alternatives and potential side effects. The patient consents to administration of the medications and understands the right to refuse medications at any time. The patient verbalized understanding.   Coordinate plan with team    Orders This Visit:  Orders Placed This Encounter   Procedures    CBC With Differential With Platelet    Comp Metabolic Panel (14)    Ethyl Alcohol    Levetiracetam, Serum    RBC Morphology Scan    Scan slide    Potassium    Comp Metabolic Panel (14)    Lipid Panel    Magnesium    Phosphorus    CBC With Differential With Platelet    Magnesium       Meds This Visit:  Requested Prescriptions     Signed Prescriptions Disp Refills    levETIRAcetam 500 MG Oral Tab 60 tablet 0     Sig: Take 1 tablet (500 mg total) by mouth 2 (two) times daily.       Karely Melvin, EMERALD  8/19/2024    Note to Patient: The 21st Century Cures Act makes medical notes like these available to patients in the interest of transparency. However, be advised this is a medical document. It is intended as peer to peer communication. It is written in medical language and may contain abbreviations or verbiage that are unfamiliar. It may appear blunt or direct. Medical documents are intended to carry relevant information, facts as evident, and the clinical opinion of the practitioner. This note may have been transcribed using a voice dictation system. Voice recognition errors may occur. This should not be taken to alter the content or meaning of this note.

## 2024-08-19 NOTE — DISCHARGE INSTRUCTIONS
Depression and Anxiety   There is often a link between how someone is feeling physically and their emotional wellbeing. It isn’t unusual to experience depression or anxiety after a physical illness, major surgery or traumatic situation. It also isn’t unusual for someone dealing with anxiety or depression to develop other physical symptoms. The following tables will give you some comparisons between the physical and emotional symptoms of depression and anxiety.  Depression  Mood disorders like depression are quite common. It is not unusual for someone who is depressed to experience both physical and emotional symptoms. A primary care physician may help you understand if your physical symptoms are related to a recent physical illness, stress, emotional turmoil, or an unexpected trauma.  Physical Symptoms:   * Changes in appetite Changes in sleep patterns,Fatigue, Persistent unexplained aches and pains, Headaches, Chest pain, Digestive problems  Behavioral Symptoms:   * Sadness, Increased irritability, Easily frustrated, Low self-esteem, Loss of interest in, pleasurable activities, Poor concentration, Suicidal thoughts  Anxiety  Stress and anxiety go hand in hand. From generalized anxiety to phobic disorders and panic attacks, these behavioral issues can cause both physical and emotional symptoms. Your feelings of anxiety could be caused by a recent physical illness, stress, emotional distress or feelings associated with an unexpected trauma. As with other mental illnesses, especially anxiety disorders, allow your physician to determine the cause of your physical health symptoms.  Physical Symptoms:   * Trouble falling or staying asleep, Heart palpitations, Trembling, Irritability, Sweating/flushing, Frequent urination or diarrhea, Being easily startled  Behavioral Symptoms:   * Persistent state of apprehension or fear, Feelings of dread without valid cause, Irritability or edginess, Intense/sudden feelings of panic or  doom,    Feelings of detachment and unreality, Catastrophic thinking, Hyper-vigilance towards signs of danger  We’re here to help  Once the cause of your physical symptoms has been determined, your physician may recommend you see a psychiatrist, psychologist or a counselor for additional support. [facility] offers a free and confidential behavioral health assessment and specialized services at our locations in [facility] and [place].  For more information, call the [facility] Help Line, available 24/7, at [phone] or visit   [email or web address].

## 2024-08-19 NOTE — PLAN OF CARE
Problem: Patient Centered Care  Goal: Patient preferences are identified and integrated in the patient's plan of care  Description: Interventions:  - What would you like us to know as we care for you? From home with partner  - Provide timely, complete, and accurate information to patient/family  - Incorporate patient and family knowledge, values, beliefs, and cultural backgrounds into the planning and delivery of care  - Encourage patient/family to participate in care and decision-making at the level they choose  - Honor patient and family perspectives and choices  Outcome: Progressing     Problem: Patient/Family Goals  Goal: Patient/Family Long Term Goal  Description: Patient's Long Term Goal:     Interventions:  - Monitor vitals  - Monitor appropriate labs  - Administer medications as ordered  - Follow MD's orders  - Update patient on plan of care   - Discharge planning     - See additional Care Plan goals for specific interventions  Outcome: Progressing  Goal: Patient/Family Short Term Goal  Description: Patient's Short Term Goal:     Interventions:   - Monitor vitals  - Monitor appropriate labs  - Administer medications as ordered  - Follow MD's orders  - Update patient on plan of care   - Discharge planning     - See additional Care Plan goals for specific interventions  Outcome: Progressing     Problem: GASTROINTESTINAL - ADULT  Goal: Minimal or absence of nausea and vomiting  Description: INTERVENTIONS:  - Maintain adequate hydration with IV or PO as ordered and tolerated  - Nasogastric tube to low intermittent suction as ordered  - Evaluate effectiveness of ordered antiemetic medications  - Provide nonpharmacologic comfort measures as appropriate  - Advance diet as tolerated, if ordered  - Obtain nutritional consult as needed  - Evaluate fluid balance  Outcome: Progressing     Problem: NEUROLOGICAL - ADULT  Goal: Absence of seizures  Description: INTERVENTIONS  - Monitor for seizure activity  - Administer  anti-seizure medications as ordered  - Monitor neurological status  Outcome: Progressing     Problem: PAIN - ADULT  Goal: Verbalizes/displays adequate comfort level or patient's stated pain goal  Description: INTERVENTIONS:  - Encourage pt to monitor pain and request assistance  - Assess pain using appropriate pain scale  - Administer analgesics based on type and severity of pain and evaluate response  - Implement non-pharmacological measures as appropriate and evaluate response  - Consider cultural and social influences on pain and pain management  - Manage/alleviate anxiety  - Utilize distraction and/or relaxation techniques  - Monitor for opioid side effects  - Notify MD/LIP if interventions unsuccessful or patient reports new pain  - Anticipate increased pain with activity and pre-medicate as appropriate  Outcome: Progressing     Problem: SAFETY ADULT - FALL  Goal: Free from fall injury  Description: INTERVENTIONS:  - Assess pt frequently for physical needs  - Identify cognitive and physical deficits and behaviors that affect risk of falls.  - [place] fall precautions as indicated by assessment.  - Educate pt/family on patient safety including physical limitations  - Instruct pt to call for assistance with activity based on assessment  - Modify environment to reduce risk of injury  - Provide assistive devices as appropriate  - Consider OT/PT consult to assist with strengthening/mobility  - Encourage toileting schedule  Outcome: Progressing     Problem: DISCHARGE PLANNING  Goal: Discharge to home or other facility with appropriate resources  Description: INTERVENTIONS:  - Identify barriers to discharge w/pt and caregiver  - Include patient/family/discharge partner in discharge planning  - Arrange for needed discharge resources and transportation as appropriate  - Identify discharge learning needs (meds, wound care, etc)  - Arrange for interpreters to assist at discharge as needed  - Consider post-discharge  preferences of patient/family/discharge partner  - Complete POLST form as appropriate  - Assess patient's ability to be responsible for managing their own health  - Refer to Case Management Department for coordinating discharge planning if the patient needs post-hospital services based on physician/LIP order or complex needs related to functional status, cognitive ability or social support system  Outcome: Progressing     Problem: ANXIETY  Goal: Will report anxiety at manageable levels  Description: INTERVENTIONS:  - Administer medication as ordered  - Teach and rehearse alternative coping skills  - Provide emotional support with 1:1 interaction with staff  Outcome: Progressing

## 2024-08-19 NOTE — PLAN OF CARE
Problem: Patient Centered Care  Goal: Patient preferences are identified and integrated in the patient's plan of care  Description: Interventions:  - What would you like us to know as we care for you? From home with partner  - Provide timely, complete, and accurate information to patient/family  - Incorporate patient and family knowledge, values, beliefs, and cultural backgrounds into the planning and delivery of care  - Encourage patient/family to participate in care and decision-making at the level they choose  - Honor patient and family perspectives and choices  Outcome: Adequate for Discharge     Problem: Patient/Family Goals  Goal: Patient/Family Long Term Goal  Description: Patient's Long Term Goal:     Interventions:  - Monitor vitals  - Monitor appropriate labs  - Administer medications as ordered  - Follow MD's orders  - Update patient on plan of care   - Discharge planning     - See additional Care Plan goals for specific interventions  Outcome: Adequate for Discharge  Goal: Patient/Family Short Term Goal  Description: Patient's Short Term Goal:     Interventions:   - Monitor vitals  - Monitor appropriate labs  - Administer medications as ordered  - Follow MD's orders  - Update patient on plan of care   - Discharge planning     - See additional Care Plan goals for specific interventions  Outcome: Adequate for Discharge     Problem: GASTROINTESTINAL - ADULT  Goal: Minimal or absence of nausea and vomiting  Description: INTERVENTIONS:  - Maintain adequate hydration with IV or PO as ordered and tolerated  - Nasogastric tube to low intermittent suction as ordered  - Evaluate effectiveness of ordered antiemetic medications  - Provide nonpharmacologic comfort measures as appropriate  - Advance diet as tolerated, if ordered  - Obtain nutritional consult as needed  - Evaluate fluid balance  Outcome: Adequate for Discharge     Problem: NEUROLOGICAL - ADULT  Goal: Absence of seizures  Description: INTERVENTIONS  -  Monitor for seizure activity  - Administer anti-seizure medications as ordered  - Monitor neurological status  Outcome: Adequate for Discharge     Problem: PAIN - ADULT  Goal: Verbalizes/displays adequate comfort level or patient's stated pain goal  Description: INTERVENTIONS:  - Encourage pt to monitor pain and request assistance  - Assess pain using appropriate pain scale  - Administer analgesics based on type and severity of pain and evaluate response  - Implement non-pharmacological measures as appropriate and evaluate response  - Consider cultural and social influences on pain and pain management  - Manage/alleviate anxiety  - Utilize distraction and/or relaxation techniques  - Monitor for opioid side effects  - Notify MD/LIP if interventions unsuccessful or patient reports new pain  - Anticipate increased pain with activity and pre-medicate as appropriate  Outcome: Adequate for Discharge     Problem: SAFETY ADULT - FALL  Goal: Free from fall injury  Description: INTERVENTIONS:  - Assess pt frequently for physical needs  - Identify cognitive and physical deficits and behaviors that affect risk of falls.  - [place] fall precautions as indicated by assessment.  - Educate pt/family on patient safety including physical limitations  - Instruct pt to call for assistance with activity based on assessment  - Modify environment to reduce risk of injury  - Provide assistive devices as appropriate  - Consider OT/PT consult to assist with strengthening/mobility  - Encourage toileting schedule  Outcome: Adequate for Discharge     Problem: DISCHARGE PLANNING  Goal: Discharge to home or other facility with appropriate resources  Description: INTERVENTIONS:  - Identify barriers to discharge w/pt and caregiver  - Include patient/family/discharge partner in discharge planning  - Arrange for needed discharge resources and transportation as appropriate  - Identify discharge learning needs (meds, wound care, etc)  - Arrange for  interpreters to assist at discharge as needed  - Consider post-discharge preferences of patient/family/discharge partner  - Complete POLST form as appropriate  - Assess patient's ability to be responsible for managing their own health  - Refer to Case Management Department for coordinating discharge planning if the patient needs post-hospital services based on physician/LIP order or complex needs related to functional status, cognitive ability or social support system  Outcome: Adequate for Discharge     Problem: ANXIETY  Goal: Will report anxiety at manageable levels  Description: INTERVENTIONS:  - Administer medication as ordered  - Teach and rehearse alternative coping skills  - Provide emotional support with 1:1 interaction with staff  Outcome: Adequate for Discharge     Patient adequate for discharge. PIV removed. AVS completed. Discharge education explained and all questions answered by this RN. Personal belongings taken with patient. Nurse  emptied

## 2024-08-19 NOTE — CM/SW NOTE
SW received MDO for PHQ- 4. SW placed PHQ-4 resources in AVS.     SW/CM to remain available for support and/or discharge planning.     Sheryl Lambert MSW, LSW   x [phone]

## 2024-08-19 NOTE — PROGRESS NOTES
[facility] met with pt at bedside to discuss outpatient mental health treatment and substance abuse treatment.  Pt stated he only needs treatment for ETOH.  Pt denied need for mental health treatment but accepted resources provided.  [facility] reviewed and provided pt with resources for outpatient mental health treatment and substance abuse treatment (residential/inpatient and outpatient).  Pt thanked [facility] for resources.      Jacklyn Webster LCSW  [facility]

## 2024-08-20 NOTE — PAYOR COMM NOTE
--------------  ADMISSION REVIEW     Payor: [facility]  Subscriber #:  [id number]  Authorization Number: [id number]    Admit date: 8/18/24  Admit time:  2:13 AM     8/17 Patient Seen in: [facility] Emergency Department    History     Patient is a 44-year-old male with a past medical history of seizure disorder on Keppra, alcohol use with history of alcohol withdrawal seizures presenting today for evaluation of tremors, nausea, vomiting, abdominal pain.  He states that his last drink was approximately 4 days ago.  He states that over the past 4 days, has been feeling tremulous and nauseous.  He states that he started having generalized abdominal pain and vomiting.  He states that he feels lightheaded and has paresthesias of bilateral feet.  States that he is trying to quit drinking.  States that he ran out of his Keppra yesterday.  Last seizure was in April 2024.    Patient History:  Past Medical History:    Seizure disorder (HCC)     History reviewed. No pertinent surgical history.     Physical Exam     ED Triage Vitals [08/17/24 1830]   /90   Pulse 97   Resp 23   Temp 98.7 °F (37.1 °C)   Temp src Temporal   SpO2 98 %   O2 Device None (Room air)     Current:/88   Pulse 98   Temp 98.7 °F (37.1 °C) (Temporal)   Resp 18   Ht 180.3 cm (5' 11\")   Wt 77.1 kg   SpO2 94%   BMI 23.71 kg/m²     Physical Exam  Constitutional:       Appearance: He is well-developed.   HENT:      Head: Normocephalic and atraumatic.      Right Ear: External ear normal.      Left Ear: External ear normal.      Nose: Nose normal.   Eyes:      Conjunctiva/sclera: Conjunctivae normal.      Pupils: Pupils are equal, round, and reactive to light.   Cardiovascular:      Rate and Rhythm: Regular rhythm. Tachycardia present.      Heart sounds: Normal heart sounds.   Pulmonary:      Effort: Pulmonary effort is normal.      Breath sounds: Normal breath sounds.   Abdominal:      General: Bowel sounds  are normal.      Palpations: Abdomen is soft.      Tenderness: There is abdominal tenderness.      Comments: Generalized abdominal tenderness with no rebound or guarding   Musculoskeletal:         General: Normal range of motion.      Cervical back: Normal range of motion and neck supple.      Right lower leg: No edema.      Left lower leg: No edema.   Skin:     General: Skin is warm and dry.      Findings: No rash.   Neurological:      General: No focal deficit present.      Mental Status: He is alert and oriented to person, place, and time.      Deep Tendon Reflexes: Reflexes are normal and symmetric.      Comments: Tremulous   Psychiatric:         Mood and Affect: Mood normal.         Behavior: Behavior normal.     Labs Reviewed   CBC WITH DIFFERENTIAL WITH PLATELET - Abnormal; Notable for the following components:       Result Value    HCT 37.7 (*)     RDW-SD 52.4 (*)     RDW 18.0 (*)     PLT 38.0 (*)     All other components within normal limits   COMP METABOLIC PANEL (14) - Abnormal; Notable for the following components:    Glucose 105 (*)     Sodium 134 (*)     Potassium 3.4 (*)     Chloride 91 (*)     Anion Gap 20 (*)     BUN 7 (*)      (*)      (*)     Alkaline Phosphatase 223 (*)     Bilirubin, Total 3.7 (*)     All other components within normal limits   ETHYL ALCOHOL - Normal   SCAN SLIDE   LEVETIRACETAM, S     Ultrasound abdomen right upper quadrant  IMPRESSION:  -Mildly distended gallbladder, no sonographic evidence of cholelithiasis or acute cholecystitis.  -Enlarged common bile duct measuring up to 1.1 cm in diameter, similar to prior ultrasound from 3/3/2024.  No sonographic evidence of obstruction, however consider correlation with serum biliary enzymes and/or MRCP for more sensitive evaluation if clinically indicated.  -Hepatic steatosis.  -Heterogenous echotexture of the pancreatic head, of uncertain significance possibly due to prior pancreatitis and appears similar compared to  prior ultrasound from 3/3/2024.  -Normal sonographic appearance of the right kidney.    EKG as interpreted by me: Ventricular rate 95, normal sinus rhythm, normal axis, no parable prolongation, narrow QRS, QTc 472 ms, no ST segment elevations or depressions, no abnormal T wave inversions  MDM   44-year-old male with a past medical history of seizure disorder on Keppra, alcohol use with history of alcohol withdrawal seizures presenting for evaluation of tremors, nausea, vomiting, and generalized abdominal pain over the past 4 days since his last drink of alcohol.  Upon arrival to emergency department, he is borderline tachycardic but otherwise hemodynamically stable.  Tremulous with generalized abdominal tenderness.    Differential diagnoses considered includes, but is not limited to: Alcohol withdrawal, alcoholic hepatitis, cirrhosis, electrolyte or metabolic derangement    Initial Medications/Therapeutics administered: IV fluids, folic acid, multivitamin, thiamine, Pepcid, Zofran, home Keppra dose, oral Ativan, Librium    Chronic conditions affecting care: Seizure disorder on Keppra, alcohol use disorder with history of alcohol withdrawal seizures    Comment: Labs overall consistent with chronic alcohol use.  AST is 600.  ALT of 147.  Total bilirubin is elevated at 3.7.  Has a history of hyperbilirubinemia likely secondary to alcoholic hepatic injury.  Thrombocytopenia likely secondary to chronic alcohol use.  He has no evidence of spontaneous bleeding.  Independently reviewed the gallbladder ultrasound images that show no evidence of gallstones.  Agree with radiology read above.  Upon reevaluation, CIWA scores improving however still mildly elevated.  Discussed continued treatment of alcohol withdrawal.  Given persistent symptoms, plan to admit for further management. I confirmed patient is not an active patient of Dr. D'Amico's. Will admit to on call internal medicine. I endorsed the patient's case to Dr. Martinez  for admission.  Psychiatry placed on consult for substance use evaluation.  Disposition and Plan     Clinical Impression:  1. Alcohol withdrawal syndrome without complication (HCC)    2. Hyperbilirubinemia          8/18:      History and Physical     Reason for Admission: alcohol withdrawal     History ofPresent Illness: 44-year-old male with past medical history of seizure disorder on Keppra, alcohol abuse presented with alcohol withdrawal seizures. Patient reported having tremors, nausea, vomiting, and abdominal pain, so decided to come to the hospital for further evaluation. Patient has been drinking everyday whiskey, pint. He wants to stop drinking alcohol.      General: NAD  HEENT: NC/AT, MMM, OP clear with no exudates, PERRL.  Neck: Supple, No LAD  Heart: Normal s1/s2, no MRG  Chest: CTAB: NT/ND, soft, + Bs, no HSM  Ext: no peripheral edema.    Neuro: CN II through XII grossly intact.  No focal deficits        Lab 08/17/24  1837   WBC 5.2   HGB 13.3   PLT 38.0*      *   K 3.4*   CL 91*   CO2 23.0   BUN 7*   *   *   ALB 4.5      PLT 38.0*          Current Medications:  Current Hospital Medications[]Expand by Default          Current Facility-Administered Medications   Medication Dose Route Frequency    levETIRAcetam (Keppra) tab 500 mg  500 mg Oral BID    HYDROcodone-acetaminophen (Norco) 5-325 MG per tab 1 tablet  1 tablet Oral Q6H PRN    sodium chloride 0.9% infusion   Intravenous Continuous    [START ON 8/19/2024] thiamine (Vitamin B1) tab 100 mg  100 mg Oral Daily    multivitamin with minerals (Thera M Plus) tab 1 tablet  1 tablet Oral Daily    folic acid (Folvite) tab 1 mg  1 mg Oral Daily    ondansetron (Zofran) 4 MG/2ML injection 4 mg  4 mg Intravenous Q6H PRN    prochlorperazine (Compazine) 10 MG/2ML injection 10 mg  10 mg Intravenous Q6H PRN    docusate sodium (Colace) cap 100 mg  100 mg Oral BID    magnesium hydroxide (Milk of Magnesia) 400 MG/5ML oral suspension 30 mL  30 mL Oral  Daily PRN    fleet enema (Fleet) 7-19 GM/118ML rectal enema 133 mL  1 enema Rectal Once PRN    enoxaparin (Lovenox) 40 MG/0.4ML SUBQ injection 40 mg  40 mg Subcutaneous Daily    LORazepam (Ativan) tab 1 mg  1 mg Oral Q1H PRN     Or    LORazepam (Ativan) tab 2 mg  2 mg Oral Q1H PRN    polyethylene glycol (PEG 3350) (Miralax) 17 g oral packet 17 g  17 g Oral Daily PRN    sennosides (Senokot) tab 17.2 mg  17.2 mg Oral Nightly PRN    bisacodyl (Dulcolax) 10 MG rectal suppository 10 mg  10 mg Rectal Daily PRN    prochlorperazine (Compazine) 10 MG/2ML injection 5 mg  5 mg Intravenous Q8H PRN    multivitamin (Tab-A-Ranulfo/Beta Carotene) tab 1 tablet  1 tablet Oral Once            ASSESSMENT:     PLAN:     Alcohol abuse: Monitor withdrawal symptoms, thiamine, folic acid, multivitamin, CIWA protocol.  Hyponatremia: Continue IV fluids.  Hypokalemia: Replacement protocol.  Transaminitis: Elevated AST and ALT likely due to alcohol abuse, continue to monitor.  Hypomagnesemia: Replacement protocol.  Thrombocytopenia likely from alcohol abuse, continue to monitor, no signs of bleeding.       PSYCH:    Alcohol withdrawal syndrome without complication (HCC)  Episodic mood disorder  Hyperbilirubinemia     Patient is a 44 year old -American, single, male with past medical history of seizure disorder, alcohol abuse with history of alcohol withdrawal seizures who was admitted to the hospital for Alcohol withdrawal syndrome without complication (HCC):      The patient has been demonstrating symptoms of withdrawal with increased anxiety, restlessness, elevated vital signs. He otherwise reports that his last drink was five days ago. He states that he is motivated to maintain sobriety on his own. He endorses some low mood, low energy, increased anxiety, worry and impairment in his sleep. He is agreeable to start medications to help balance his mood and emotions.      Discussed risk and benefit, acknowledging the current symptom and  severity.  At this point, I would recommend the following approach:      Focus on safety  Focus on education and support.  Focus on insight orientation helping the patient understand diagnosis and treatment plan.  Continue CIWA protocol  Start Remeron 7.5 mg nightly  Processed with patient at length, the initiation of the above psychotropic medications I advised the patient of the risks, benefits, alternatives and potential side effects. The patient consents to administration of the medications and understands the right to refuse medications at any time. The patient verbalized understanding.   Coordinate plan with team      MEDICATIONS ADMINISTERED IN LAST 1 DAY:  sodium phosphate 15 mmol in 0.9% NaCl 100mL IVPB premix       Date Action Dose Route User    Discharged on 8/19/2024 8/19/2024 1615 Given 15 mmol Intravenous Melyssa Stallworth, RN            Vitals (last day) before discharge       Date/Time Temp Pulse Resp BP SpO2 Weight O2 Device O2 Flow Rate (L/min) [facility]    08/19/24 1034 99.4 °F (37.4 °C) 110 16 124/95 98 % -- None (Room air) --     08/19/24 0600 -- 94 -- -- -- -- -- -- MD    08/19/24 0514 98.6 °F (37 °C) 88 18 137/99 97 % -- None (Room air) -- MD    08/19/24 0400 -- 96 -- -- -- -- -- -- MD    08/19/24 0200 -- 97 -- -- -- -- -- -- MD    08/19/24 0000 -- 90 -- -- -- -- -- -- MD    08/18/24 2108 98.4 °F (36.9 °C) -- 18 -- 95 % -- None (Room air) -- MD    08/18/24 2100 -- 101 -- 138/101 -- -- -- -- MD    08/18/24 2000 -- 97 -- -- -- -- -- --     08/18/24 1855 -- -- -- 136/99 -- -- -- -- Y    08/18/24 1700 98.2 °F (36.8 °C) -- 18 114/86 95 % -- None (Room air) -- D    08/18/24 1500 -- -- -- 138/106 -- -- -- -- YD    08/18/24 1300 -- -- -- 127/96 -- -- -- -- YD    08/18/24 1100 -- 100 -- 136/102 -- -- -- -- YD    08/18/24 0900 -- 92 -- 132/96 -- -- -- -- YD    08/18/24 0800 98.5 °F (36.9 °C) 99 19 128/101 91 % -- None (Room air) -- AB    08/18/24 0700 -- 92 -- -- -- -- -- -- AG    08/18/24 0500 98.6 °F  (37 °C) 120 20 133/99 -- -- -- -- AG    08/18/24 0400 -- 91 -- -- -- -- -- -- AG    08/18/24 0301 -- 94 -- -- -- -- -- -- KD    08/18/24 0216 -- -- -- -- -- 156 lb 12.8 oz (71.1 kg) -- -- AG    08/18/24 0214 98.1 °F (36.7 °C) 105 20 151/96 95 % -- None (Room air) -- AG    08/18/24 0115 -- 98 18 130/88 94 % -- None (Room air) -- KR          CIWA Scores (last 3 days) before discharge       Date/Time CIWA-Ar Total Who    08/19/24 0600 2 MD    08/19/24 0400 2 MD    08/19/24 0200 2 MD    08/19/24 0000 4 MD    08/18/24 2200 4 MD    08/18/24 2100 4 MD    08/18/24 2000 4 MD    08/18/24 1855 5 YD    08/18/24 1700 6 YD    08/18/24 1500 5 YD    08/18/24 1300 6 YD    08/18/24 1100 5 YD    08/18/24 0900 5 YD    08/18/24 0800 7 AB    08/18/24 0700 2 AG    08/18/24 0500 7 AG    08/18/24 0400 0 AG    08/18/24 0214 2 AG    08/17/24 2320 4 KS    08/17/24 2050 6 VS    08/17/24 1830 7 VS

## 2024-08-20 NOTE — PAYOR COMM NOTE
--------------  DISCHARGE REVIEW    Payor: [facility]  Subscriber #:  [id number]  Authorization Number: [id number]    Admit date: 8/18/24  Admit time:   2:13 AM  Discharge Date: 8/19/2024  5:32 PM         [facility]     Discharge Summary    Admit date: 8/17/2024    Discharge date: 8/19/2024    Primary care provider: [unfilled]    Discharge Physician: Yari Martinez MD, MD    Final Diagnoses:  Alcohol abuse and withdrawal    Secondary Diagnosis:  Transamintis     Consultations:  none    Operations/Procedures:  none    HPI & Hospital Course:   Alcohol abuse: Monitor withdrawal symptoms, thiamine, folic acid, multivitamin, CIWA protocol.  Hyponatremia: Continue IV fluids.  Hypokalemia: Replacement protocol.  Transaminitis: Elevated AST and ALT likely due to alcohol abuse, continue to monitor.  Hypomagnesemia: Replacement protocol.  Thrombocytopenia likely from alcohol abuse, continue to monitor, no signs of bleeding.          Disposition: to home in stable condition     Patient Instructions:     Discharge Meds  Current Discharge Medication List        START taking these medications    Details   mirtazapine 7.5 MG Oral Tab Take 1 tablet (7.5 mg total) by mouth nightly.  Qty: 30 tablet, Refills: 0           CONTINUE these medications which have CHANGED    Details   levETIRAcetam 500 MG Oral Tab Take 1 tablet (500 mg total) by mouth 2 (two) times daily.  Qty: 60 tablet, Refills: 0           CONTINUE these medications which have NOT CHANGED    Details   cyanocobalamin 1000 MCG Oral Tab Take 1 tablet (1,000 mcg total) by mouth daily.  Qty: 30 tablet, Refills: 0      folic acid 1 MG Oral Tab Take 1 tablet (1 mg total) by mouth daily.  Qty: 30 tablet, Refills: 0      HYDROcodone-acetaminophen 5-325 MG Oral Tab Take 1 tablet by mouth every 6 (six) hours as needed.  Qty: 15 tablet, Refills: 0    Associated Diagnoses: Neuropathy, alcoholic (HCC)      thiamine 100 MG Oral Tab Take 1 tablet  (100 mg total) by mouth in the morning, at noon, and at bedtime.  Qty: 30 tablet, Refills: 0      lidocaine-menthol 4-1 % External Patch Place 1 patch onto the skin daily.  Qty: 15 patch, Refills: 0             [unfilled]       Follow-up     pcp    Discharge instructions reviewed. Patient  agrees and understands. All questions answered.    Total time spent: 35 mins    [facility]      Electronically signed by Yari Martinez MD on 8/19/2024  3:03 PM         REVIEWER COMMENTS

## 2024-08-21 LAB — LEVETIRACETAM LVL: <2 UG/ML

## (undated) NOTE — LETTER
Hospital Discharge Documentation  Please phone to schedule a hospital follow up appointment.    From: [facility] Hospitalist's Office  Phone: [phone]    Patient discharged time/date: 2024  5:37 PM  Patient discharge disposition:  Home or Self Care       Discharge Summary - D/C Summary        Discharge Summary signed by Vickie Rivera MD at 2024 11:13 AM  Version 1 of 1      Author: Vickie Rivera MD Service: Hospitalist Author Type: Physician    Filed: 2024 11:13 AM Date of Service: 2024 11:12 AM Status: Signed    : Vickie Rivera MD (Physician)         [facility]  part of [facility]     Discharge Summary    Rodolfo Valdez Patient Status:  Inpatient    1980 MRN [id number]   Location [facility] 5SW/SE Attending Vickie Rivera MD   Hosp Day # 2 PCP Jeff Anthony D'Amico,      Date of Admission: 2024    Date of Discharge: 2024    Admitting Diagnosis: Hypokalemia [E87.6]  Hypomagnesemia [E83.42]  ETOH abuse [F10.10]  Generalized tonic-clonic seizure (HCC) [G40.409]    Discharge Diagnosis:   Patient Active Problem List   Diagnosis    Severe sepsis (HCC)    Community acquired pneumonia of left lower lobe of lung    GUILLERMINA (acute kidney injury) (HCC)    Hyponatremia    Focal motor seizure (HCC)    Subdural empyema (HCC)    Syncope and collapse    Confusion and disorientation    Hypokalemia    Hypomagnesemia    Generalized tonic-clonic seizure (HCC)    ETOH abuse    Neuropathy, alcoholic (HCC)       Reason for Admission:     Generalized Tonic-clonic Seizure    Physical Exam:     General: Patient is alert and oriented x3 does not appear to be in acute distress at this time  HEENT: EOMI PERRLA, atraumatic normocephalic  Cardiac: S1-S2 appreciated  Lungs: Good air entry bilaterally clear to auscultation  Abdomen: Soft nontender nondistended positive bowel sounds  Ext: Peripheral pulses are positive  Neuro: No focal deficits  noted  Psych: Normal mood  Skin: No rashes noted  MSK: Full range of motion intact      Hospital Course:     Generalized tonic-clonic seizures.    - patient will be admitted to the hospital, started on CIWA protocol.    -Neurology has been placed on consult.    -We will appreciate recommendations.    -We will continue to monitor the patient closely.     Severe hypokalemia.   - We will aggressively replete.  Initial electrolyte replacement protocol.    -Repeat labs in a.m. and recheck.     Hypomagnesemia.  We will replete as well.  Alcohol abuse.  As above.  Thrombocytopenia, likely in the setting of chronic alcohol use.  Transaminitis, likely due to above as well.     VTE prophylaxis.  We will hold pharmacologic VTE prophylaxis in the setting of thrombocytopenia.     Disposition:  At this time, we will continue to monitor the patient closely.  Patient is a Full Code.  Further recommendations to follow.     Global A/P  -hypophosphatemia - replete  -transaminitis - worsening - will continue to monitor.                 - recent Liver US 03/03/2024 - had sludge at that time.     History of Present Illness:     Patient is a 44-year-old male with past medical history of seizure disorder, who had come to the hospital for a seizure evaluation. History has been obtained from patient's family and fiancee, that he had 2 generalized tonic-clonic seizures, which lasted about 2 minutes. The patient did have initially a brain infection a few years ago and was admitted with seizures at that time. Patient continues to drink half a pint of alcohol daily, last drink was about 3 days ago. Currently denies any chest pain, shortness of breath, palpitation, nausea, vomiting, diarrhea.     Disposition: Home or Self Care    Discharge Condition: Stable    Discharge Medications:   Current Discharge Medication List        START taking these medications    Details   cyanocobalamin 1000 MCG Oral Tab Take 1 tablet (1,000 mcg total) by mouth  daily.  Qty: 30 tablet, Refills: 0      folic acid 1 MG Oral Tab Take 1 tablet (1 mg total) by mouth daily.  Qty: 30 tablet, Refills: 0      HYDROcodone-acetaminophen 5-325 MG Oral Tab Take 1 tablet by mouth every 6 (six) hours as needed.  Qty: 15 tablet, Refills: 0    Associated Diagnoses: Neuropathy, alcoholic (HCC)      thiamine 100 MG Oral Tab Take 1 tablet (100 mg total) by mouth in the morning, at noon, and at bedtime.  Qty: 30 tablet, Refills: 0           CONTINUE these medications which have CHANGED    Details   lidocaine-menthol 4-1 % External Patch Place 1 patch onto the skin daily.  Qty: 15 patch, Refills: 0             Total dc time > 30 min    Vickie Rivera MD  4/29/2024  11:12 AM     Hospital Discharge Diagnoses:  Generalized weakness    Lace+ Score: 49  59-90 High Risk  29-58 Medium Risk  0-28   Low Risk.    TCM Follow-Up Recommendation:  LACE 29-58: Moderate Risk of readmission after discharge from the hospital.       Electronically signed by Vickie Rivera MD on 4/29/2024 11:13 AM

## (undated) NOTE — LETTER
Hospital Discharge Documentation  Please phone to schedule a hospital follow up appointment. No discharge summary available at this time, below is the most recent progress note  for your review .        From: [facility] Hospitalist's Office  Phone: [phone]    Patient discharged time/date: 3/4/2024  5:28 PM  Patient discharge disposition:  Home or Self Care    D/C Summary    No notes of this type exist for this encounter.       [facility]  part of [facility]     History & Physical           Rodolfo Valdez Patient Status:  Emergency    1980 MRN [id number]   Location [facility] EMERGENCY DEPARTMENT Attending Leonel Fontaine MD   Hosp Day # 0 PCP Jeff Anthony D'Amico,       Date:  3/3/2024  Date of Admission:  3/2/2024     Chief Complaint:      Chief Complaint   Patient presents with    Syncope         History of Present Illness:  Rodolfo Valdez is a(n) 43 year old male, who presents for evaluation after a syncopal episode at home. PMHx significant for alcohol abuse, focal motor seizure.  Patient accompanied by girlfriend at bedside who reports that she heard as if someone fell to the ground in the bathroom and she went to check and saw the patient slumped over on the floor.  He was unconscious for about 2 to 3 minutes and then regained consciousness but was confused repeatedly asking the same questions.  No seizure-like activity observed.  No bladder or bowel incontinence.  Patient does remember going to the bathroom but then nothing after.  He denies feel lightheaded or dizzy prior to the episode.  He does report alcohol use with last drink 3 to 4 days prior to presentation.  Denies any history of DTs or admissions for alcohol withdrawal.  States that he has been drinking since the age of 19.  He also uses cannabinoids daily.  During my evaluation, patient is alert and oriented x 4 denies any headache or change in vision.  Denies any neck pain, chest  pain, shortness of breath.  Denies any history of heart disease or any family history of sudden cardiac death.  Denies recent illness.  Reports that he is able to eat and drink without any issues.  Denies melena, hematochezia.  Reports normal bowel movements.  Per chart review, he was hospitalized at [facility] 1/10-1/21/23 for strep pneumo bacteremia, was found to have subdural empyema for which she was treated with IV antibiotics.  Patient currently denies any fever or chills.  He does report wishes to stop drinking alcohol.  Denies previous syncopal episodes.  On presentation to the ED, initial vital signs reassuring with temp 98, heart rate 92, respiratory rate 19, blood pressure 140/102.  Lab work remarkable for potassium level 3.0, magnesium level 1.4, elevated LFTs consistent with alcoholic hepatitis, , ALT 83, total bilirubin 2.6.  CT head without any acute intra cranial abnormalities, no mass effect or bleeding.  Abdominal ultrasound does reveal cirrhotic liver otherwise unremarkable.  EKG significant for prolonged .  Patient was treated with magnesium sulfate 2 g IV x 1, potassium chloride 40 mill equivalents, 1 L IV fluid     History:  History reviewed. No pertinent past medical history.  History reviewed. No pertinent surgical history.  No family history on file.   reports that he has been smoking cigarettes. He has never used smokeless tobacco. He reports current alcohol use. He reports that he does not use drugs.     Allergies:       Allergies   Allergen Reactions    Penicillins ANGIOEDEMA         Home Medications:  Prior to Admission Medications   Prescriptions Last Dose Informant Patient Reported? Taking?   allopurinol 100 MG Oral Tab     No No   Sig: Take 1 tablet (100 mg total) by mouth daily.   lidocaine-menthol 4-1 % External Patch     No No   Sig: Place 1 patch onto the skin daily.   Patient not taking: No sig reported      Facility-Administered Medications: None          Review of Systems:  Constitutional: Negative  Eye:  Negative.  Ear/Nose/Mouth/Throat:  Negative.  Respiratory:  Negative  Cardiovascular: Negative  Gastrointestinal:  Negative.  Genitourinary:  Negative  Endocrine:  Negative.  Immunologic:  Negative.  Musculoskeletal:  Negative.  Integumentary:  Negative.  Neurologic:  Negative.  Psychiatric:  Negative.  ROS reviewed as documented in chart     Physical Exam:  Temp:  [98 °F (36.7 °C)] 98 °F (36.7 °C)  Pulse:  [85-92] 85  Resp:  [12-33] 33  BP: (133-144)/() 144/98  SpO2:  [96 %-99 %] 96 %     General:  Alert and oriented.  Diffuse skin problem:  None.  Eye:  Pupils are equal, round and reactive to light, extraocular movements are intact, Normal conjunctiva.  HENT:  Normocephalic, oral mucosa is moist.  Head:  Normocephalic, atraumatic.  Neck:  Supple, non-tender, no carotid bruit, no jugular venous distention, no lymphadenopathy, no thyromegaly.  Respiratory:  Lungs are clear to auscultation, respirations are non-labored, breath sounds are equal, symmetrical chest wall expansion.  Cardiovascular:  Normal rate, regular rhythm, no murmur, no edema.  Gastrointestinal:  Soft, non-tender, non-distended, normal bowel sounds, no organomegaly.  Lymphatics:  No lymphadenopathy neck, axilla, groin.  Musculoskeletal: Normal range of motion.  normal strength.  Feet:  Normal pulses.  Neurologic:  Alert, oriented, no focal deficits, cranial nerves II-XII are grossly intact.  Cognition and Speech:  Oriented, speech clear and coherent.  Psychiatric:  Cooperative, appropriate mood & affect.        Laboratory Data:         Lab Results   Component Value Date     WBC 5.1 03/02/2024     HGB 13.2 03/02/2024     HCT 36.1 03/02/2024     .0 03/02/2024     CREATSERUM 0.75 03/02/2024     BUN 8 03/02/2024      03/02/2024     K 3.0 03/02/2024      03/02/2024     CO2 23.0 03/02/2024      03/02/2024     CA 8.5 03/02/2024     ALB 4.6 03/02/2024     ALKPHO 217  03/02/2024     BILT 2.6 03/02/2024     TP 7.9 03/02/2024      03/02/2024     ALT 83 03/02/2024     MG 1.4 03/02/2024     ETOH <3 03/02/2024         Imaging:  No results found.      Assessment and Plan:     Syncopal episode  -Admitted for further workup of syncopal episode.  Vasovagal versus cardiac.  EKG noted to have QT prolongation which could be contributing to the event.  Previous EKGs in the EMR without any  QT prolongation.  CT head without intracranial abnormalities, mass effect or bleeding.  He does have a history of focal motor seizures but he is not on any antiseizure medications.  No postictal state.  -Will obtain complete echocardiogram to look for any cardiac abnormalities  -Maintain on telemetry.  If no arrhythmia observed during hospital stay, patient may need outpatient heart monitor.  -Fall precautions/seizure precautions     QT prolongation  -Noted to have QT prolongation of 484, suspecting secondary to electrolyte abnormalities as below.  -Daily EKGs to monitor Qtc.  Obtain complete echo to further evaluate for any cardiac abnormalities     Hypokalemia  Hypomagnesemia  -Noted to have potassium level 3.0 as well as magnesium level 1.4.  Suspecting secondary to ongoing alcohol use.  Repleted in the ED  -Continue with replacement per protocol     Liver cirrhosis  Alcoholic hepatitis  -LFTs reveal  ALT 83 alk phos 217.  Abdominal ultrasound does reveal cirrhotic liver.  -Continue to trend LFTs.  Send hepatitis panel.  Patient will need closer follow-up as an outpatient.     Alcohol abuse.  Currently with no signs or symptoms of withdrawal.  Ethanol level within normal limits  -Monitor for tremor, tachycardia, psychosis, anxiety  -Alcohol cessation encouraged  -Social work for resources  -Scheduled daily folate/thiamine/multivitamins     Thrombocytopenia  -Noted to have decreased platelet count 133, likely in the setting of ongoing alcohol use  -Continue to closely monitor with daily  CBC     Prophylaxis  Subcutaneous heparin     CODE STATUS  Full     Primary care physician  Jeff Anthony D'Amico, DO     60 minutes spent on this admission - examining patient, obtaining history, reviewing previous medical records, going over test results/imaging and discussing plan of care. All questions answered.      Disposition  Clinical course will dictate outcome        Eula Estrada MD  3/3/2024  2:23 AM               Electronically signed by Eula Estrada MD at 3/3/2024  6:33 AM